# Patient Record
Sex: FEMALE | Race: WHITE | NOT HISPANIC OR LATINO | Employment: OTHER | URBAN - METROPOLITAN AREA
[De-identification: names, ages, dates, MRNs, and addresses within clinical notes are randomized per-mention and may not be internally consistent; named-entity substitution may affect disease eponyms.]

---

## 2023-05-19 ENCOUNTER — NURSING HOME VISIT (OUTPATIENT)
Dept: POST ACUTE CARE | Facility: EXTERNAL LOCATION | Age: 68
End: 2023-05-19
Payer: COMMERCIAL

## 2023-05-19 DIAGNOSIS — R60.0 LOWER EXTREMITY EDEMA: Primary | ICD-10-CM

## 2023-05-19 DIAGNOSIS — I10 PRIMARY HYPERTENSION: ICD-10-CM

## 2023-05-19 PROCEDURE — 99304 1ST NF CARE SF/LOW MDM 25: CPT | Performed by: INTERNAL MEDICINE

## 2023-05-19 NOTE — LETTER
Patient: Hannah Pedraza  : 1955    Encounter Date: 2023    Pt was seen in the NH,69 yo f with PMH HTN CVA  HERE FOR REHAB AFTER CERVICAL SPINE LAMINECTOMY AND FUSION CO LLE EDEMA  General appearance: Comfortable, no distress  ROS: No SOB  Medications reviewed  Head: Normal  Neck: Soft  Heart: Regular  Lungs: Clear  Abdomen: soft  EXT LLE EDEMA    Impression: VENOUS DOPPLER, continue current management    Problem List Items Addressed This Visit    None  Visit Diagnoses       Lower extremity edema    -  Primary    Primary hypertension                   Electronically Signed By: Josue Lock MD   23 11:52 PM

## 2023-05-20 NOTE — PROGRESS NOTES
Pt was seen in the NH,69 yo f with PMH HTN CVA  HERE FOR REHAB AFTER CERVICAL SPINE LAMINECTOMY AND FUSION CO LLE EDEMA  General appearance: Comfortable, no distress  ROS: No SOB  Medications reviewed  Head: Normal  Neck: Soft  Heart: Regular  Lungs: Clear  Abdomen: soft  EXT LLE EDEMA    Impression: VENOUS DOPPLER, continue current management    Problem List Items Addressed This Visit    None  Visit Diagnoses       Lower extremity edema    -  Primary    Primary hypertension

## 2023-05-30 ENCOUNTER — NURSING HOME VISIT (OUTPATIENT)
Dept: POST ACUTE CARE | Facility: EXTERNAL LOCATION | Age: 68
End: 2023-05-30
Payer: COMMERCIAL

## 2023-05-30 DIAGNOSIS — I10 PRIMARY HYPERTENSION: ICD-10-CM

## 2023-05-30 DIAGNOSIS — I82.4Z2 ACUTE DEEP VEIN THROMBOSIS (DVT) OF DISTAL VEIN OF LEFT LOWER EXTREMITY (MULTI): Primary | ICD-10-CM

## 2023-05-30 PROCEDURE — 99308 SBSQ NF CARE LOW MDM 20: CPT | Performed by: INTERNAL MEDICINE

## 2023-05-30 NOTE — PROGRESS NOTES
Pt was seen in the NH,Pt is doing fine , no complaint  General appearance: Comfortable, no distress  ROS: No SOB  Medications reviewed  Head: Normal  Neck: Soft  Heart: Regular  Lungs: Clear  Abdomen: soft  Ext chronic le edema    Impression: clinically doing fine, continue current management    Problem List Items Addressed This Visit          Circulatory    Primary hypertension    Acute deep vein thrombosis (DVT) of distal vein of left lower extremity (CMS/HCC) - Primary

## 2023-05-30 NOTE — LETTER
Patient: Hannah Pedraza  : 1955    Encounter Date: 2023    Pt was seen in the NH,Pt is doing fine , no complaint  General appearance: Comfortable, no distress  ROS: No SOB  Medications reviewed  Head: Normal  Neck: Soft  Heart: Regular  Lungs: Clear  Abdomen: soft  Ext chronic le edema    Impression: clinically doing fine, continue current management    Problem List Items Addressed This Visit          Circulatory    Primary hypertension    Acute deep vein thrombosis (DVT) of distal vein of left lower extremity (CMS/HCC) - Primary          Electronically Signed By: Josue Lock MD   23  5:35 PM

## 2023-06-01 ENCOUNTER — NURSING HOME VISIT (OUTPATIENT)
Dept: POST ACUTE CARE | Facility: EXTERNAL LOCATION | Age: 68
End: 2023-06-01
Payer: COMMERCIAL

## 2023-06-01 DIAGNOSIS — M54.2 SPINE PAIN, CERVICAL: ICD-10-CM

## 2023-06-01 DIAGNOSIS — I82.4Z2 ACUTE DEEP VEIN THROMBOSIS (DVT) OF DISTAL VEIN OF LEFT LOWER EXTREMITY (MULTI): Primary | ICD-10-CM

## 2023-06-01 DIAGNOSIS — E87.6 HYPOKALEMIA: ICD-10-CM

## 2023-06-01 PROCEDURE — 99309 SBSQ NF CARE MODERATE MDM 30: CPT | Performed by: NURSE PRACTITIONER

## 2023-06-01 NOTE — LETTER
Patient: Hannah Pedraza  : 1955    Encounter Date: 2023    Subjective  Patient ID: Hannah Pedraza is a 68 y.o. female who presents for No chief complaint on file..  67 yo female at [facility] for rehab/recent cervical spine surgery with recent diagnosis of DVT LLE.   is present in room.  Resident states she is doing fairly well except she does not feel her pain is being controlled.  Her pain is rated 8/10 especially in the early mornings.          Review of Systems   Constitutional:  Negative for fever.   Respiratory: Negative.     Cardiovascular: Negative.    Gastrointestinal: Negative.        Objective  Physical Exam  Constitutional:       General: She is not in acute distress.  Cardiovascular:      Rate and Rhythm: Normal rate and regular rhythm.   Pulmonary:      Effort: Pulmonary effort is normal.      Breath sounds: Normal breath sounds.   Abdominal:      General: Bowel sounds are normal.   Musculoskeletal:      Left lower leg: Edema present.      Comments: Surgical incision noted to cervical spine without any edema, erythema.  No drainage.  Well approximated.     Skin:     General: Skin is warm and dry.   Neurological:      Mental Status: She is alert.   Psychiatric:         Mood and Affect: Mood normal.         No current outpatient medications on file.     Assessment/Plan  Problem List Items Addressed This Visit          Nervous    Spine pain, cervical       Circulatory    Acute deep vein thrombosis (DVT) of distal vein of left lower extremity (CMS/HCC) - Primary       Other    Hypokalemia   Will schedule her oxycodone 5mg BID.  Continue rehab.    Hypokalemia;  She is on Lasix.  Start her on K+ and repeat her labs next wk.    Continue to monitor.  Care/tx discussed with nursing staff.             Electronically Signed By: IRAIS Lopez   23  7:59 AM

## 2023-06-07 PROBLEM — M54.2 SPINE PAIN, CERVICAL: Status: ACTIVE | Noted: 2023-06-07

## 2023-06-07 PROBLEM — E87.6 HYPOKALEMIA: Status: ACTIVE | Noted: 2023-06-07

## 2023-06-07 ASSESSMENT — ENCOUNTER SYMPTOMS
FEVER: 0
GASTROINTESTINAL NEGATIVE: 1
RESPIRATORY NEGATIVE: 1
CARDIOVASCULAR NEGATIVE: 1

## 2023-06-07 NOTE — PROGRESS NOTES
Subjective   Patient ID: Hannah Pedraza is a 68 y.o. female who presents for No chief complaint on file..  69 yo female at [facility] for rehab/recent cervical spine surgery with recent diagnosis of DVT LLE.   is present in room.  Resident states she is doing fairly well except she does not feel her pain is being controlled.  Her pain is rated 8/10 especially in the early mornings.          Review of Systems   Constitutional:  Negative for fever.   Respiratory: Negative.     Cardiovascular: Negative.    Gastrointestinal: Negative.        Objective   Physical Exam  Constitutional:       General: She is not in acute distress.  Cardiovascular:      Rate and Rhythm: Normal rate and regular rhythm.   Pulmonary:      Effort: Pulmonary effort is normal.      Breath sounds: Normal breath sounds.   Abdominal:      General: Bowel sounds are normal.   Musculoskeletal:      Left lower leg: Edema present.      Comments: Surgical incision noted to cervical spine without any edema, erythema.  No drainage.  Well approximated.     Skin:     General: Skin is warm and dry.   Neurological:      Mental Status: She is alert.   Psychiatric:         Mood and Affect: Mood normal.         No current outpatient medications on file.     Assessment/Plan   Problem List Items Addressed This Visit          Nervous    Spine pain, cervical       Circulatory    Acute deep vein thrombosis (DVT) of distal vein of left lower extremity (CMS/HCC) - Primary       Other    Hypokalemia   Will schedule her oxycodone 5mg BID.  Continue rehab.    Hypokalemia;  She is on Lasix.  Start her on K+ and repeat her labs next wk.    Continue to monitor.  Care/tx discussed with nursing staff.

## 2023-06-27 ENCOUNTER — NURSING HOME VISIT (OUTPATIENT)
Dept: POST ACUTE CARE | Facility: EXTERNAL LOCATION | Age: 68
End: 2023-06-27
Payer: COMMERCIAL

## 2023-06-27 DIAGNOSIS — I10 PRIMARY HYPERTENSION: Primary | ICD-10-CM

## 2023-06-27 DIAGNOSIS — E11.65 TYPE 2 DIABETES MELLITUS WITH HYPERGLYCEMIA, WITHOUT LONG-TERM CURRENT USE OF INSULIN (MULTI): ICD-10-CM

## 2023-06-27 PROCEDURE — 99308 SBSQ NF CARE LOW MDM 20: CPT | Performed by: INTERNAL MEDICINE

## 2023-06-27 NOTE — LETTER
Patient: Hannah Pedraza  : 1955    Encounter Date: 2023    Pt was seen in the NH,Pt is in her usual state , no complaint  General appearance: Comfortable, no distress  ROS: No SOB  Medications reviewed  Head: Normal  Neck: Soft  Heart: Regular  Lungs: Clear  Abdomen: soft    Impression: clinically doing fine, continue current management    Problem List Items Addressed This Visit       Primary hypertension - Primary    Type 2 diabetes mellitus with hyperglycemia, without long-term current use of insulin (CMS/[facility])          Electronically Signed By: Josue Lock MD   23  8:02 PM

## 2023-06-28 NOTE — PROGRESS NOTES
Pt was seen in the NH,Pt is in her usual state , no complaint  General appearance: Comfortable, no distress  ROS: No SOB  Medications reviewed  Head: Normal  Neck: Soft  Heart: Regular  Lungs: Clear  Abdomen: soft    Impression: clinically doing fine, continue current management    Problem List Items Addressed This Visit       Primary hypertension - Primary    Type 2 diabetes mellitus with hyperglycemia, without long-term current use of insulin (CMS/[facility])

## 2023-07-25 ENCOUNTER — NURSING HOME VISIT (OUTPATIENT)
Dept: POST ACUTE CARE | Facility: EXTERNAL LOCATION | Age: 68
End: 2023-07-25
Payer: COMMERCIAL

## 2023-07-25 DIAGNOSIS — L98.421: Primary | ICD-10-CM

## 2023-07-25 DIAGNOSIS — I10 PRIMARY HYPERTENSION: ICD-10-CM

## 2023-07-25 PROCEDURE — 99308 SBSQ NF CARE LOW MDM 20: CPT | Performed by: INTERNAL MEDICINE

## 2023-07-25 NOTE — LETTER
Patient: Hannah Pedraza  : 1955    Encounter Date: 2023    Pt was seen in the NH,Pt CO SORE ON THE SACRAL AREA  General appearance: Comfortable, no distress  ROS: No SOB  Medications reviewed  Head: Normal  Neck: Soft  Heart: Regular  Lungs: Clear  Abdomen: soft  SKIN SMALL CRACK ON THE SKIN AT SACRAL AREA    Impression: LOTRISONE CREAM BID WITH FOAM DRESSING X 7 DAYS THE DOUDERM, continue current management    Problem List Items Addressed This Visit       Primary hypertension     Other Visit Diagnoses       Sacral ulcer, limited to breakdown of skin (CMS/HCC)    -  Primary               Electronically Signed By: Josue Lock MD   23  4:21 PM

## 2023-07-25 NOTE — PROGRESS NOTES
Pt was seen in the NH,Pt CO SORE ON THE SACRAL AREA  General appearance: Comfortable, no distress  ROS: No SOB  Medications reviewed  Head: Normal  Neck: Soft  Heart: Regular  Lungs: Clear  Abdomen: soft  SKIN SMALL CRACK ON THE SKIN AT SACRAL AREA    Impression: LOTRISONE CREAM BID WITH FOAM DRESSING X 7 DAYS THE DOUDERM, continue current management    Problem List Items Addressed This Visit       Primary hypertension     Other Visit Diagnoses       Sacral ulcer, limited to breakdown of skin (CMS/HCC)    -  Primary             
Awake

## 2023-08-29 ENCOUNTER — NURSING HOME VISIT (OUTPATIENT)
Dept: POST ACUTE CARE | Facility: EXTERNAL LOCATION | Age: 68
End: 2023-08-29
Payer: COMMERCIAL

## 2023-08-29 DIAGNOSIS — I10 PRIMARY HYPERTENSION: ICD-10-CM

## 2023-08-29 DIAGNOSIS — J44.9 CHRONIC OBSTRUCTIVE PULMONARY DISEASE, UNSPECIFIED COPD TYPE (MULTI): Primary | ICD-10-CM

## 2023-08-29 PROCEDURE — 99308 SBSQ NF CARE LOW MDM 20: CPT | Performed by: INTERNAL MEDICINE

## 2023-08-29 NOTE — LETTER
Patient: Hannah Pedraza  : 1955    Encounter Date: 2023    Pt was seen in the NH.  Pt is in usual state , CO INCREASED PHLEGM  General appearance: Comfortable, no distress  ROS: No SOB  Medications reviewed  Head: Normal  Neck: Soft  Heart: Regular  Lungs: Clear  Abdomen: soft    Impression: clinically doing fine, ADD MUCINEX, continue current management    Problem List Items Addressed This Visit       Primary hypertension     Other Visit Diagnoses       Chronic obstructive pulmonary disease, unspecified COPD type (CMS/HCC)    -  Primary               Electronically Signed By: Josue Lock MD   23  7:29 PM

## 2023-08-29 NOTE — PROGRESS NOTES
Pt was seen in the NH.  Pt is in usual state , CO INCREASED PHLEGM  General appearance: Comfortable, no distress  ROS: No SOB  Medications reviewed  Head: Normal  Neck: Soft  Heart: Regular  Lungs: Clear  Abdomen: soft    Impression: clinically doing fine, ADD MUCINEX, continue current management    Problem List Items Addressed This Visit       Primary hypertension     Other Visit Diagnoses       Chronic obstructive pulmonary disease, unspecified COPD type (CMS/HCC)    -  Primary

## 2023-10-11 ENCOUNTER — HOSPITAL ENCOUNTER (EMERGENCY)
Facility: HOSPITAL | Age: 68
Discharge: NURSING, REHAB OR LONG-TERM CARE | End: 2023-10-11
Attending: EMERGENCY MEDICINE
Payer: COMMERCIAL

## 2023-10-11 ENCOUNTER — APPOINTMENT (OUTPATIENT)
Dept: RADIOLOGY | Facility: HOSPITAL | Age: 68
End: 2023-10-11
Payer: COMMERCIAL

## 2023-10-11 VITALS
DIASTOLIC BLOOD PRESSURE: 74 MMHG | WEIGHT: 183.2 LBS | SYSTOLIC BLOOD PRESSURE: 99 MMHG | OXYGEN SATURATION: 95 % | TEMPERATURE: 97.6 F | HEART RATE: 76 BPM | RESPIRATION RATE: 16 BRPM

## 2023-10-11 DIAGNOSIS — R11.10 VOMITING, UNSPECIFIED VOMITING TYPE, UNSPECIFIED WHETHER NAUSEA PRESENT: Primary | ICD-10-CM

## 2023-10-11 DIAGNOSIS — R11.2 NAUSEA AND VOMITING, UNSPECIFIED VOMITING TYPE: ICD-10-CM

## 2023-10-11 DIAGNOSIS — E27.8 RIGHT ADRENAL MASS (MULTI): ICD-10-CM

## 2023-10-11 LAB
ABO GROUP (TYPE) IN BLOOD: NORMAL
ALBUMIN SERPL BCP-MCNC: 3.8 G/DL (ref 3.4–5)
ALP SERPL-CCNC: 57 U/L (ref 33–136)
ALT SERPL W P-5'-P-CCNC: 8 U/L (ref 7–45)
ANION GAP SERPL CALC-SCNC: 13 MMOL/L (ref 10–20)
ANTIBODY SCREEN: NORMAL
APPEARANCE UR: CLEAR
AST SERPL W P-5'-P-CCNC: 9 U/L (ref 9–39)
BACTERIA #/AREA URNS AUTO: ABNORMAL /HPF
BASOPHILS # BLD AUTO: 0.05 X10*3/UL (ref 0–0.1)
BASOPHILS NFR BLD AUTO: 0.4 %
BILIRUB SERPL-MCNC: 0.5 MG/DL (ref 0–1.2)
BILIRUB UR STRIP.AUTO-MCNC: NEGATIVE MG/DL
BUN SERPL-MCNC: 18 MG/DL (ref 6–23)
CALCIUM SERPL-MCNC: 9.5 MG/DL (ref 8.6–10.3)
CARDIAC TROPONIN I PNL SERPL HS: 12 NG/L (ref 0–13)
CARDIAC TROPONIN I PNL SERPL HS: 7 NG/L (ref 0–13)
CHLORIDE SERPL-SCNC: 102 MMOL/L (ref 98–107)
CO2 SERPL-SCNC: 34 MMOL/L (ref 21–32)
COLOR UR: YELLOW
CREAT SERPL-MCNC: 0.62 MG/DL (ref 0.5–1.05)
EOSINOPHIL # BLD AUTO: 0.21 X10*3/UL (ref 0–0.7)
EOSINOPHIL NFR BLD AUTO: 1.7 %
ERYTHROCYTE [DISTWIDTH] IN BLOOD BY AUTOMATED COUNT: 17 % (ref 11.5–14.5)
GFR SERPL CREATININE-BSD FRML MDRD: >90 ML/MIN/1.73M*2
GLUCOSE SERPL-MCNC: 118 MG/DL (ref 74–99)
GLUCOSE UR STRIP.AUTO-MCNC: NEGATIVE MG/DL
HCT VFR BLD AUTO: 43.4 % (ref 36–46)
HGB BLD-MCNC: 13.9 G/DL (ref 12–16)
HOLD SPECIMEN: NORMAL
IMM GRANULOCYTES # BLD AUTO: 0.06 X10*3/UL (ref 0–0.7)
IMM GRANULOCYTES NFR BLD AUTO: 0.5 % (ref 0–0.9)
KETONES UR STRIP.AUTO-MCNC: NEGATIVE MG/DL
LACTATE SERPL-SCNC: 1.6 MMOL/L (ref 0.4–2)
LEUKOCYTE ESTERASE UR QL STRIP.AUTO: ABNORMAL
LIPASE SERPL-CCNC: 11 U/L (ref 9–82)
LYMPHOCYTES # BLD AUTO: 3.25 X10*3/UL (ref 1.2–4.8)
LYMPHOCYTES NFR BLD AUTO: 26.5 %
MCH RBC QN AUTO: 27.9 PG (ref 26–34)
MCHC RBC AUTO-ENTMCNC: 32 G/DL (ref 32–36)
MCV RBC AUTO: 87 FL (ref 80–100)
MONOCYTES # BLD AUTO: 1.09 X10*3/UL (ref 0.1–1)
MONOCYTES NFR BLD AUTO: 8.9 %
MUCOUS THREADS #/AREA URNS AUTO: ABNORMAL /LPF
NEUTROPHILS # BLD AUTO: 7.61 X10*3/UL (ref 1.2–7.7)
NEUTROPHILS NFR BLD AUTO: 62 %
NITRITE UR QL STRIP.AUTO: NEGATIVE
NRBC BLD-RTO: 0 /100 WBCS (ref 0–0)
PH UR STRIP.AUTO: 7 [PH]
PLATELET # BLD AUTO: 329 X10*3/UL (ref 150–450)
PMV BLD AUTO: 9.8 FL (ref 7.5–11.5)
POTASSIUM SERPL-SCNC: 3.1 MMOL/L (ref 3.5–5.3)
PROT SERPL-MCNC: 6.9 G/DL (ref 6.4–8.2)
PROT UR STRIP.AUTO-MCNC: ABNORMAL MG/DL
RBC # BLD AUTO: 4.99 X10*6/UL (ref 4–5.2)
RBC # UR STRIP.AUTO: NEGATIVE /UL
RBC #/AREA URNS AUTO: >20 /HPF
RH FACTOR (ANTIGEN D): NORMAL
SODIUM SERPL-SCNC: 146 MMOL/L (ref 136–145)
SP GR UR STRIP.AUTO: 1.01
SQUAMOUS #/AREA URNS AUTO: ABNORMAL /HPF
UROBILINOGEN UR STRIP.AUTO-MCNC: <2 MG/DL
WBC # BLD AUTO: 12.3 X10*3/UL (ref 4.4–11.3)
WBC #/AREA URNS AUTO: ABNORMAL /HPF

## 2023-10-11 PROCEDURE — 84484 ASSAY OF TROPONIN QUANT: CPT | Performed by: EMERGENCY MEDICINE

## 2023-10-11 PROCEDURE — 87086 URINE CULTURE/COLONY COUNT: CPT | Mod: CMCLAB,SAMLAB | Performed by: EMERGENCY MEDICINE

## 2023-10-11 PROCEDURE — 2550000001 HC RX 255 CONTRASTS: Performed by: EMERGENCY MEDICINE

## 2023-10-11 PROCEDURE — 2500000004 HC RX 250 GENERAL PHARMACY W/ HCPCS (ALT 636 FOR OP/ED): Performed by: EMERGENCY MEDICINE

## 2023-10-11 PROCEDURE — 93005 ELECTROCARDIOGRAM TRACING: CPT

## 2023-10-11 PROCEDURE — 74177 CT ABD & PELVIS W/CONTRAST: CPT | Mod: FOREIGN READ | Performed by: RADIOLOGY

## 2023-10-11 PROCEDURE — 83605 ASSAY OF LACTIC ACID: CPT | Performed by: EMERGENCY MEDICINE

## 2023-10-11 PROCEDURE — G1004 CDSM NDSC: HCPCS

## 2023-10-11 PROCEDURE — 81001 URINALYSIS AUTO W/SCOPE: CPT | Performed by: EMERGENCY MEDICINE

## 2023-10-11 PROCEDURE — 85025 COMPLETE CBC W/AUTO DIFF WBC: CPT | Performed by: EMERGENCY MEDICINE

## 2023-10-11 PROCEDURE — 36415 COLL VENOUS BLD VENIPUNCTURE: CPT | Performed by: EMERGENCY MEDICINE

## 2023-10-11 PROCEDURE — 80053 COMPREHEN METABOLIC PANEL: CPT | Performed by: EMERGENCY MEDICINE

## 2023-10-11 PROCEDURE — 83690 ASSAY OF LIPASE: CPT | Performed by: EMERGENCY MEDICINE

## 2023-10-11 PROCEDURE — 84484 ASSAY OF TROPONIN QUANT: CPT | Mod: 59 | Performed by: EMERGENCY MEDICINE

## 2023-10-11 PROCEDURE — C9113 INJ PANTOPRAZOLE SODIUM, VIA: HCPCS | Performed by: EMERGENCY MEDICINE

## 2023-10-11 PROCEDURE — 99284 EMERGENCY DEPT VISIT MOD MDM: CPT | Performed by: EMERGENCY MEDICINE

## 2023-10-11 PROCEDURE — 86850 RBC ANTIBODY SCREEN: CPT | Performed by: EMERGENCY MEDICINE

## 2023-10-11 RX ORDER — LOPERAMIDE HCL 2 MG
2 TABLET ORAL EVERY 4 HOURS PRN
COMMUNITY

## 2023-10-11 RX ORDER — FERROUS SULFATE 325(65) MG
1 TABLET, DELAYED RELEASE (ENTERIC COATED) ORAL 2 TIMES DAILY
COMMUNITY

## 2023-10-11 RX ORDER — POTASSIUM CHLORIDE 20 MEQ/1
20 TABLET, EXTENDED RELEASE ORAL DAILY
COMMUNITY

## 2023-10-11 RX ORDER — OXYCODONE HYDROCHLORIDE 5 MG/1
5 TABLET ORAL 2 TIMES DAILY
COMMUNITY

## 2023-10-11 RX ORDER — PANTOPRAZOLE SODIUM 40 MG/10ML
80 INJECTION, POWDER, LYOPHILIZED, FOR SOLUTION INTRAVENOUS ONCE
Status: COMPLETED | OUTPATIENT
Start: 2023-10-11 | End: 2023-10-11

## 2023-10-11 RX ORDER — ONDANSETRON 4 MG/1
4 TABLET, FILM COATED ORAL EVERY 4 HOURS PRN
COMMUNITY

## 2023-10-11 RX ORDER — LIDOCAINE 560 MG/1
1 PATCH PERCUTANEOUS; TOPICAL; TRANSDERMAL DAILY
COMMUNITY

## 2023-10-11 RX ORDER — POTASSIUM CHLORIDE 14.9 MG/ML
20 INJECTION INTRAVENOUS
Status: COMPLETED | OUTPATIENT
Start: 2023-10-11 | End: 2023-10-11

## 2023-10-11 RX ORDER — CARVEDILOL 12.5 MG/1
12.5 TABLET ORAL 2 TIMES DAILY
COMMUNITY

## 2023-10-11 RX ORDER — LORATADINE 10 MG/1
10 TABLET ORAL DAILY PRN
COMMUNITY

## 2023-10-11 RX ORDER — ERGOCALCIFEROL 1.25 MG/1
1.25 CAPSULE ORAL
COMMUNITY
End: 2024-02-19 | Stop reason: ENTERED-IN-ERROR

## 2023-10-11 RX ORDER — DOCUSATE SODIUM 100 MG/1
100 CAPSULE, LIQUID FILLED ORAL 2 TIMES DAILY
COMMUNITY

## 2023-10-11 RX ORDER — GLIMEPIRIDE 2 MG/1
2 TABLET ORAL DAILY
COMMUNITY

## 2023-10-11 RX ORDER — FLUOROMETHOLONE 1 MG/ML
1 SUSPENSION/ DROPS OPHTHALMIC 3 TIMES DAILY
COMMUNITY
End: 2024-02-19 | Stop reason: ENTERED-IN-ERROR

## 2023-10-11 RX ORDER — ONDANSETRON HYDROCHLORIDE 2 MG/ML
4 INJECTION, SOLUTION INTRAVENOUS ONCE
Status: COMPLETED | OUTPATIENT
Start: 2023-10-11 | End: 2023-10-11

## 2023-10-11 RX ORDER — METFORMIN HYDROCHLORIDE 1000 MG/1
1000 TABLET ORAL
COMMUNITY

## 2023-10-11 RX ORDER — FAMOTIDINE 20 MG/1
20 TABLET, FILM COATED ORAL NIGHTLY
COMMUNITY

## 2023-10-11 RX ORDER — ACETAMINOPHEN 500 MG
1000 TABLET ORAL 3 TIMES DAILY
COMMUNITY

## 2023-10-11 RX ORDER — SODIUM CHLORIDE 5 %
1 OINTMENT (GRAM) OPHTHALMIC (EYE) 4 TIMES DAILY
COMMUNITY
End: 2024-02-19 | Stop reason: ENTERED-IN-ERROR

## 2023-10-11 RX ORDER — MENTHOL 100 MG/G
1 CREAM TOPICAL EVERY 6 HOURS PRN
COMMUNITY

## 2023-10-11 RX ORDER — TIZANIDINE 4 MG/1
2 TABLET ORAL EVERY 8 HOURS PRN
COMMUNITY

## 2023-10-11 RX ORDER — GABAPENTIN 800 MG/1
800 TABLET ORAL 3 TIMES DAILY
COMMUNITY

## 2023-10-11 RX ORDER — ALBUTEROL SULFATE 90 UG/1
2 AEROSOL, METERED RESPIRATORY (INHALATION) EVERY 4 HOURS PRN
COMMUNITY

## 2023-10-11 RX ORDER — DULOXETIN HYDROCHLORIDE 60 MG/1
60 CAPSULE, DELAYED RELEASE ORAL DAILY
COMMUNITY

## 2023-10-11 RX ORDER — ATORVASTATIN CALCIUM 40 MG/1
40 TABLET, FILM COATED ORAL DAILY
COMMUNITY

## 2023-10-11 RX ORDER — SENNOSIDES 8.6 MG/1
1 TABLET ORAL 2 TIMES DAILY
COMMUNITY

## 2023-10-11 RX ORDER — SODIUM CHLORIDE 9 MG/ML
150 INJECTION, SOLUTION INTRAVENOUS CONTINUOUS
Status: DISCONTINUED | OUTPATIENT
Start: 2023-10-11 | End: 2023-10-11 | Stop reason: HOSPADM

## 2023-10-11 RX ORDER — FUROSEMIDE 20 MG/1
20 TABLET ORAL DAILY
COMMUNITY

## 2023-10-11 RX ORDER — NYSTATIN 100000 [USP'U]/G
1 POWDER TOPICAL 3 TIMES DAILY
COMMUNITY
Start: 2024-02-06

## 2023-10-11 RX ADMIN — ONDANSETRON 4 MG: 2 INJECTION INTRAMUSCULAR; INTRAVENOUS at 02:28

## 2023-10-11 RX ADMIN — IOHEXOL 90 ML: 350 INJECTION, SOLUTION INTRAVENOUS at 04:06

## 2023-10-11 RX ADMIN — PANTOPRAZOLE SODIUM 80 MG: 40 INJECTION, POWDER, FOR SOLUTION INTRAVENOUS at 02:29

## 2023-10-11 RX ADMIN — SODIUM CHLORIDE 500 ML: 9 INJECTION, SOLUTION INTRAVENOUS at 02:00

## 2023-10-11 RX ADMIN — POTASSIUM CHLORIDE 20 MEQ: 14.9 INJECTION, SOLUTION INTRAVENOUS at 07:17

## 2023-10-11 RX ADMIN — POTASSIUM CHLORIDE 20 MEQ: 14.9 INJECTION, SOLUTION INTRAVENOUS at 05:09

## 2023-10-11 RX ADMIN — SODIUM CHLORIDE 150 ML/HR: 9 INJECTION, SOLUTION INTRAVENOUS at 02:35

## 2023-10-11 ASSESSMENT — ENCOUNTER SYMPTOMS
MUSCULOSKELETAL NEGATIVE: 1
CONSTITUTIONAL NEGATIVE: 1
VOMITING: 1
ALLERGIC/IMMUNOLOGIC NEGATIVE: 1
NEUROLOGICAL NEGATIVE: 1
PSYCHIATRIC NEGATIVE: 1
NAUSEA: 1
RESPIRATORY NEGATIVE: 1
HEMATOLOGIC/LYMPHATIC NEGATIVE: 1
DIARRHEA: 1
EYES NEGATIVE: 1
CARDIOVASCULAR NEGATIVE: 1
ENDOCRINE NEGATIVE: 1
ABDOMINAL PAIN: 1

## 2023-10-11 ASSESSMENT — COLUMBIA-SUICIDE SEVERITY RATING SCALE - C-SSRS
1. IN THE PAST MONTH, HAVE YOU WISHED YOU WERE DEAD OR WISHED YOU COULD GO TO SLEEP AND NOT WAKE UP?: NO
6. HAVE YOU EVER DONE ANYTHING, STARTED TO DO ANYTHING, OR PREPARED TO DO ANYTHING TO END YOUR LIFE?: NO
2. HAVE YOU ACTUALLY HAD ANY THOUGHTS OF KILLING YOURSELF?: NO

## 2023-10-11 ASSESSMENT — PAIN - FUNCTIONAL ASSESSMENT: PAIN_FUNCTIONAL_ASSESSMENT: 0-10

## 2023-10-11 ASSESSMENT — PAIN SCALES - GENERAL: PAINLEVEL_OUTOF10: 4

## 2023-10-11 NOTE — ED PROVIDER NOTES
Coffee-ground emesis.  This 68-year-old white female presents to the ED via EMS from a skilled nursing facility due to complaint of coffee-ground emesis.  The patient states that she has been experiencing diarrhea for past 3 days and since yesterday has been having nausea and vomiting.  Apparently prior to arrival to the ED the patient had a large dark-colored emesis.  Patient states that she does have very good vision and has not noticed any blood in her emesis up to this point time.  She does admit to some diffuse abdominal pain states is worse in the epigastrium.  She states that she was treated with Imodium for her diarrhea that started 3 days ago.  She states that nothing makes her symptoms better or worse.  She denies any shortness of breath or chest pain.      History provided by:  Patient and EMS personnel   used: No         Review of Systems   Constitutional: Negative.    HENT: Negative.     Eyes: Negative.    Respiratory: Negative.     Cardiovascular: Negative.    Gastrointestinal:  Positive for abdominal pain, diarrhea, nausea and vomiting.        Coffee-ground emesis.   Endocrine: Negative.    Genitourinary: Negative.    Musculoskeletal: Negative.    Skin: Negative.    Allergic/Immunologic: Negative.    Neurological: Negative.    Hematological: Negative.    Psychiatric/Behavioral: Negative.          Physical Exam  Vitals reviewed.   Constitutional:       Appearance: Normal appearance.   HENT:      Head: Normocephalic.      Nose: Nose normal.      Mouth/Throat:      Mouth: Mucous membranes are moist.      Pharynx: Oropharynx is clear.   Eyes:      Comments: Patient for the most part keeps her eyes closed and does not have good eye contact.   Cardiovascular:      Rate and Rhythm: Regular rhythm. Tachycardia present.      Pulses: Normal pulses.      Heart sounds: Normal heart sounds.   Pulmonary:      Effort: Pulmonary effort is normal.      Breath sounds: Normal breath sounds.    Abdominal:      General: Bowel sounds are normal.      Palpations: Abdomen is soft.      Tenderness: There is abdominal tenderness.      Comments: Patient has tenderness across the upper abdomen worse in the epigastrium with voluntary guarding.  There is no rebound or rigidity.  No CVA tenderness   Musculoskeletal:         General: Swelling present. Normal range of motion.      Cervical back: Normal range of motion and neck supple.   Skin:     General: Skin is warm and dry.      Capillary Refill: Capillary refill takes less than 2 seconds.   Neurological:      General: No focal deficit present.      Mental Status: She is alert and oriented to person, place, and time. Mental status is at baseline.   Psychiatric:         Mood and Affect: Mood normal.         Behavior: Behavior normal.         Thought Content: Thought content normal.         Judgment: Judgment normal.          Labs Reviewed - No data to display     No orders to display        Procedures     Medical Decision Making  Patient care has been turned over to the oncoming ED attending.  Patient was being seen and evaluated due to complaints of coffee-ground emesis.    Amount and/or Complexity of Data Reviewed  ECG/medicine tests: independent interpretation performed.     Details: EKG was interpreted by myself at 1:57 AM reveals sinus tachycardia 120 bpm with Q waves in leads III and aVF.  No other acute ST or T wave changes noted.  The OK interval is 142 ms.  The QRS duration 76 ms.  The QTc is 463 ms.  Axis is 21 degrees.         Diagnoses as of 12/02/23 0756   Right adrenal mass (CMS/HCC)   Nausea and vomiting, unspecified vomiting type                    Pacheco Laboy, DO  12/02/23 0756

## 2023-10-11 NOTE — PROGRESS NOTES
Emergency Medicine Transition of Care Note.    I received Hannah Pedraza in signout from Dr. Laboy.  Please see the previous ED provider note for all HPI, PE and MDM up to the time of signout at 07:00. This is in addition to the primary record.    In brief Hannah Pedraza is an 68 y.o. female presenting for   Chief Complaint   Patient presents with    Vomiting Blood     Pt here via EMS from [facility], pt states that she's been vomiting large amounts of dark/black emesis for two days. Had some diarrhea x 2 days before that which has resolved. Pt states she was given zofran but it hasn't helped the N/V. Is on Eliquis for LLE DVT. Denies hx of GI bleed/ulcers. Reports lower abd pain. BP 85/66     At the time of signout we were awaiting: urinalysis    Diagnoses as of 10/11/23 1210   Right adrenal mass (CMS/HCC)       Medical Decision Making    I was asked to evaluate the patient's urinalysis which was pending at the time of physician handoff.  Urinalysis shows no acute issues    I went into the room to reevaluate the patient and she is resting comfortably and is pleased she will be discharged.  I went over her CAT scan with her including the incidental findings of multiple gallstones and a right adrenal mass.  The patient informs me that she is aware of the right adrenal mass and this is known to her and not a new problem.  She states she was notified about this about 10 years ago.  Given its stability I do not feel she needs further treatment on an emergent basis about this issue but was instructed to follow-up with her private physician and she understands this importance    Patient will be discharged back to her ECF in stable condition    Final diagnoses:   [E27.8] Right adrenal mass (CMS/HCC)           Procedure  Procedures    Gerry Goodwin, DO

## 2023-10-12 LAB — BACTERIA UR CULT: ABNORMAL

## 2023-10-13 ENCOUNTER — TELEPHONE (OUTPATIENT)
Dept: PHARMACY | Facility: HOSPITAL | Age: 68
End: 2023-10-13
Payer: COMMERCIAL

## 2023-10-13 NOTE — PROGRESS NOTES
EDPD Note: Lab/Chart Reviewed    Reviewed Mr./Mrs./Ms. Hannah Pedraza 's chart regarding a contaminated urine culture that was taken during their recent emergency room visit. The patient was transferred back to their rehab/LTC facility .Therefore, I have faxed this information to [facility] at fax number [phone]          No further follow up needed from EDPD Team.     Dolly Thomas, PharmD  PGY-1 Pharmacy Resident  [phone]

## 2023-11-19 ENCOUNTER — NURSING HOME VISIT (OUTPATIENT)
Dept: POST ACUTE CARE | Facility: EXTERNAL LOCATION | Age: 68
End: 2023-11-19
Payer: COMMERCIAL

## 2023-11-19 DIAGNOSIS — J44.9 CHRONIC OBSTRUCTIVE PULMONARY DISEASE, UNSPECIFIED COPD TYPE (MULTI): Primary | ICD-10-CM

## 2023-11-19 DIAGNOSIS — I10 PRIMARY HYPERTENSION: ICD-10-CM

## 2023-11-19 PROCEDURE — 99308 SBSQ NF CARE LOW MDM 20: CPT | Performed by: INTERNAL MEDICINE

## 2023-11-19 NOTE — LETTER
Patient: Hannah Pedraza  : 1955    Encounter Date: 2023    Pt was seen in the NH.  Pt is in usual state , no complaint  General appearance: Comfortable, no distress  ROS: No SOB  Medications reviewed  Head: Normal  Neck: Soft  Heart: Regular  Lungs: Clear  Abdomen: soft    Impression: clinically doing fine, continue current management    Problem List Items Addressed This Visit       Primary hypertension     Other Visit Diagnoses       Chronic obstructive pulmonary disease, unspecified COPD type (CMS/HCC)    -  Primary               Electronically Signed By: Josue Lock MD   23 10:22 PM

## 2023-11-20 NOTE — PROGRESS NOTES
Pt was seen in the NH.  Pt is in usual state , no complaint  General appearance: Comfortable, no distress  ROS: No SOB  Medications reviewed  Head: Normal  Neck: Soft  Heart: Regular  Lungs: Clear  Abdomen: soft    Impression: clinically doing fine, continue current management    Problem List Items Addressed This Visit       Primary hypertension     Other Visit Diagnoses       Chronic obstructive pulmonary disease, unspecified COPD type (CMS/HCC)    -  Primary

## 2023-12-13 LAB
HOLD SPECIMEN: NORMAL
HOLD SPECIMEN: NORMAL

## 2024-01-30 ENCOUNTER — NURSING HOME VISIT (OUTPATIENT)
Dept: POST ACUTE CARE | Facility: EXTERNAL LOCATION | Age: 69
End: 2024-01-30
Payer: COMMERCIAL

## 2024-01-30 DIAGNOSIS — I10 PRIMARY HYPERTENSION: ICD-10-CM

## 2024-01-30 DIAGNOSIS — M54.50 CHRONIC MIDLINE LOW BACK PAIN WITHOUT SCIATICA: Primary | ICD-10-CM

## 2024-01-30 DIAGNOSIS — G89.29 CHRONIC MIDLINE LOW BACK PAIN WITHOUT SCIATICA: Primary | ICD-10-CM

## 2024-01-30 PROCEDURE — 99308 SBSQ NF CARE LOW MDM 20: CPT | Performed by: INTERNAL MEDICINE

## 2024-01-30 NOTE — LETTER
Patient: Hannah Pedraza  : 1955    Encounter Date: 2024    Pt was seen in the NH.  Co LBP , mild, no radation  General appearance: Comfortable, no distress  ROS: No SOB  Medications reviewed  Head: Normal  Neck: Soft  Heart: Regular  Lungs: Clear  Abdomen: soft    Impression: LS spine xr, continue current management    Problem List Items Addressed This Visit       Primary hypertension     Other Visit Diagnoses       Chronic midline low back pain without sciatica    -  Primary               Electronically Signed By: Josue Lock MD   24  9:32 PM

## 2024-01-31 NOTE — PROGRESS NOTES
Pt was seen in the NH.  Co LBP , mild, no radation  General appearance: Comfortable, no distress  ROS: No SOB  Medications reviewed  Head: Normal  Neck: Soft  Heart: Regular  Lungs: Clear  Abdomen: soft    Impression: LS spine xr, continue current management    Problem List Items Addressed This Visit       Primary hypertension     Other Visit Diagnoses       Chronic midline low back pain without sciatica    -  Primary

## 2024-02-19 ENCOUNTER — APPOINTMENT (OUTPATIENT)
Dept: RADIOLOGY | Facility: HOSPITAL | Age: 69
DRG: 853 | End: 2024-02-19
Payer: COMMERCIAL

## 2024-02-19 ENCOUNTER — APPOINTMENT (OUTPATIENT)
Dept: CARDIOLOGY | Facility: HOSPITAL | Age: 69
DRG: 853 | End: 2024-02-19
Payer: COMMERCIAL

## 2024-02-19 ENCOUNTER — HOSPITAL ENCOUNTER (INPATIENT)
Facility: HOSPITAL | Age: 69
LOS: 7 days | Discharge: OTHER INSTITUTION | DRG: 853 | End: 2024-02-26
Attending: EMERGENCY MEDICINE | Admitting: HOSPITALIST
Payer: COMMERCIAL

## 2024-02-19 DIAGNOSIS — I50.33 CHF (CONGESTIVE HEART FAILURE), NYHA CLASS I, ACUTE ON CHRONIC, DIASTOLIC (MULTI): ICD-10-CM

## 2024-02-19 DIAGNOSIS — K81.9 CHOLECYSTITIS: ICD-10-CM

## 2024-02-19 DIAGNOSIS — N13.2 HYDRONEPHROSIS WITH URINARY OBSTRUCTION DUE TO URETERAL CALCULUS: ICD-10-CM

## 2024-02-19 DIAGNOSIS — I50.42 CHRONIC COMBINED SYSTOLIC (CONGESTIVE) AND DIASTOLIC (CONGESTIVE) HEART FAILURE (MULTI): ICD-10-CM

## 2024-02-19 DIAGNOSIS — R65.21 SEPTIC SHOCK (MULTI): Primary | ICD-10-CM

## 2024-02-19 DIAGNOSIS — A41.9 SEPTIC SHOCK (MULTI): Primary | ICD-10-CM

## 2024-02-19 DIAGNOSIS — H04.123 BILATERAL DRY EYES: ICD-10-CM

## 2024-02-19 DIAGNOSIS — N39.0 URINARY TRACT INFECTION WITHOUT HEMATURIA, SITE UNSPECIFIED: ICD-10-CM

## 2024-02-19 DIAGNOSIS — N20.1 CALCULUS OF URETER: ICD-10-CM

## 2024-02-19 LAB
ALBUMIN SERPL BCP-MCNC: 2.9 G/DL (ref 3.4–5)
ALP SERPL-CCNC: 118 U/L (ref 33–136)
ALT SERPL W P-5'-P-CCNC: 26 U/L (ref 7–45)
ANION GAP SERPL CALC-SCNC: 16 MMOL/L (ref 10–20)
APPEARANCE UR: ABNORMAL
APTT PPP: 33 SECONDS (ref 27–38)
AST SERPL W P-5'-P-CCNC: 38 U/L (ref 9–39)
BACTERIA #/AREA URNS AUTO: ABNORMAL /HPF
BASOPHILS # BLD AUTO: 0 X10*3/UL (ref 0–0.1)
BASOPHILS NFR BLD AUTO: 0 %
BILIRUB DIRECT SERPL-MCNC: 1.3 MG/DL (ref 0–0.3)
BILIRUB SERPL-MCNC: 1.9 MG/DL (ref 0–1.2)
BILIRUB UR STRIP.AUTO-MCNC: NEGATIVE MG/DL
BNP SERPL-MCNC: 127 PG/ML (ref 0–99)
BUN SERPL-MCNC: 27 MG/DL (ref 6–23)
CALCIUM SERPL-MCNC: 9.2 MG/DL (ref 8.6–10.3)
CARDIAC TROPONIN I PNL SERPL HS: 23 NG/L (ref 0–13)
CARDIAC TROPONIN I PNL SERPL HS: 33 NG/L (ref 0–13)
CHLORIDE SERPL-SCNC: 102 MMOL/L (ref 98–107)
CO2 SERPL-SCNC: 22 MMOL/L (ref 21–32)
COLOR UR: ABNORMAL
CREAT SERPL-MCNC: 1.3 MG/DL (ref 0.5–1.05)
EGFRCR SERPLBLD CKD-EPI 2021: 45 ML/MIN/1.73M*2
EOSINOPHIL # BLD AUTO: 0.02 X10*3/UL (ref 0–0.7)
EOSINOPHIL NFR BLD AUTO: 0.5 %
ERYTHROCYTE [DISTWIDTH] IN BLOOD BY AUTOMATED COUNT: 13.5 % (ref 11.5–14.5)
FLUAV RNA RESP QL NAA+PROBE: NOT DETECTED
FLUBV RNA RESP QL NAA+PROBE: NOT DETECTED
GLUCOSE BLD MANUAL STRIP-MCNC: 198 MG/DL (ref 74–99)
GLUCOSE BLD MANUAL STRIP-MCNC: 212 MG/DL (ref 74–99)
GLUCOSE SERPL-MCNC: 88 MG/DL (ref 74–99)
GLUCOSE UR STRIP.AUTO-MCNC: NEGATIVE MG/DL
GRAN CASTS #/AREA UR COMP ASSIST: ABNORMAL /LPF
HCT VFR BLD AUTO: 43.2 % (ref 36–46)
HGB BLD-MCNC: 13.4 G/DL (ref 12–16)
HOLD SPECIMEN: NORMAL
HYALINE CASTS #/AREA URNS AUTO: ABNORMAL /LPF
IMM GRANULOCYTES # BLD AUTO: 0.01 X10*3/UL (ref 0–0.7)
IMM GRANULOCYTES NFR BLD AUTO: 0.2 % (ref 0–0.9)
INR PPP: 1.7 (ref 0.9–1.1)
KETONES UR STRIP.AUTO-MCNC: NEGATIVE MG/DL
LACTATE SERPL-SCNC: 2.3 MMOL/L (ref 0.4–2)
LACTATE SERPL-SCNC: 2.5 MMOL/L (ref 0.4–2)
LACTATE SERPL-SCNC: 2.6 MMOL/L (ref 0.4–2)
LACTATE SERPL-SCNC: 3.6 MMOL/L (ref 0.4–2)
LACTATE SERPL-SCNC: 4.9 MMOL/L (ref 0.4–2)
LACTATE SERPL-SCNC: 5 MMOL/L (ref 0.4–2)
LEUKOCYTE ESTERASE UR QL STRIP.AUTO: ABNORMAL
LYMPHOCYTES # BLD AUTO: 0.48 X10*3/UL (ref 1.2–4.8)
LYMPHOCYTES NFR BLD AUTO: 10.9 %
MAGNESIUM SERPL-MCNC: 1.67 MG/DL (ref 1.6–2.4)
MCH RBC QN AUTO: 30 PG (ref 26–34)
MCHC RBC AUTO-ENTMCNC: 31 G/DL (ref 32–36)
MCV RBC AUTO: 97 FL (ref 80–100)
MONOCYTES # BLD AUTO: 0.02 X10*3/UL (ref 0.1–1)
MONOCYTES NFR BLD AUTO: 0.5 %
MUCOUS THREADS #/AREA URNS AUTO: ABNORMAL /LPF
NEUTROPHILS # BLD AUTO: 3.87 X10*3/UL (ref 1.2–7.7)
NEUTROPHILS NFR BLD AUTO: 87.9 %
NITRITE UR QL STRIP.AUTO: NEGATIVE
NRBC BLD-RTO: 0 /100 WBCS (ref 0–0)
PH UR STRIP.AUTO: 5 [PH]
PLATELET # BLD AUTO: 201 X10*3/UL (ref 150–450)
POTASSIUM SERPL-SCNC: 5 MMOL/L (ref 3.5–5.3)
PROT SERPL-MCNC: 5.4 G/DL (ref 6.4–8.2)
PROT UR STRIP.AUTO-MCNC: ABNORMAL MG/DL
PROTHROMBIN TIME: 19.5 SECONDS (ref 9.8–12.8)
RBC # BLD AUTO: 4.47 X10*6/UL (ref 4–5.2)
RBC # UR STRIP.AUTO: ABNORMAL /UL
RBC #/AREA URNS AUTO: >20 /HPF
SARS-COV-2 RNA RESP QL NAA+PROBE: NOT DETECTED
SODIUM SERPL-SCNC: 135 MMOL/L (ref 136–145)
SP GR UR STRIP.AUTO: 1.02
UFH PPP CHRO-ACNC: 1.4 IU/ML
UFH PPP CHRO-ACNC: 1.8 IU/ML
UROBILINOGEN UR STRIP.AUTO-MCNC: <2 MG/DL
WBC # BLD AUTO: 4.4 X10*3/UL (ref 4.4–11.3)
WBC #/AREA URNS AUTO: >50 /HPF
WBC CLUMPS #/AREA URNS AUTO: ABNORMAL /HPF

## 2024-02-19 PROCEDURE — 96375 TX/PRO/DX INJ NEW DRUG ADDON: CPT

## 2024-02-19 PROCEDURE — 71250 CT THORAX DX C-: CPT | Performed by: RADIOLOGY

## 2024-02-19 PROCEDURE — 87086 URINE CULTURE/COLONY COUNT: CPT | Mod: SAMLAB | Performed by: EMERGENCY MEDICINE

## 2024-02-19 PROCEDURE — 82248 BILIRUBIN DIRECT: CPT | Performed by: EMERGENCY MEDICINE

## 2024-02-19 PROCEDURE — 94664 DEMO&/EVAL PT USE INHALER: CPT

## 2024-02-19 PROCEDURE — 96367 TX/PROPH/DG ADDL SEQ IV INF: CPT

## 2024-02-19 PROCEDURE — 2500000002 HC RX 250 W HCPCS SELF ADMINISTERED DRUGS (ALT 637 FOR MEDICARE OP, ALT 636 FOR OP/ED): Performed by: HOSPITALIST

## 2024-02-19 PROCEDURE — 2500000004 HC RX 250 GENERAL PHARMACY W/ HCPCS (ALT 636 FOR OP/ED)

## 2024-02-19 PROCEDURE — 96361 HYDRATE IV INFUSION ADD-ON: CPT

## 2024-02-19 PROCEDURE — 99291 CRITICAL CARE FIRST HOUR: CPT | Performed by: HOSPITALIST

## 2024-02-19 PROCEDURE — 83605 ASSAY OF LACTIC ACID: CPT | Performed by: HOSPITALIST

## 2024-02-19 PROCEDURE — 96376 TX/PRO/DX INJ SAME DRUG ADON: CPT

## 2024-02-19 PROCEDURE — 3E033XZ INTRODUCTION OF VASOPRESSOR INTO PERIPHERAL VEIN, PERCUTANEOUS APPROACH: ICD-10-PCS | Performed by: STUDENT IN AN ORGANIZED HEALTH CARE EDUCATION/TRAINING PROGRAM

## 2024-02-19 PROCEDURE — 99222 1ST HOSP IP/OBS MODERATE 55: CPT | Performed by: SURGERY

## 2024-02-19 PROCEDURE — 2500000005 HC RX 250 GENERAL PHARMACY W/O HCPCS: Performed by: EMERGENCY MEDICINE

## 2024-02-19 PROCEDURE — 2020000001 HC ICU ROOM DAILY

## 2024-02-19 PROCEDURE — 85520 HEPARIN ASSAY: CPT | Performed by: HOSPITALIST

## 2024-02-19 PROCEDURE — 76705 ECHO EXAM OF ABDOMEN: CPT | Performed by: RADIOLOGY

## 2024-02-19 PROCEDURE — 94762 N-INVAS EAR/PLS OXIMTRY CONT: CPT

## 2024-02-19 PROCEDURE — 82947 ASSAY GLUCOSE BLOOD QUANT: CPT

## 2024-02-19 PROCEDURE — 9420000001 HC RT PATIENT EDUCATION 5 MIN

## 2024-02-19 PROCEDURE — 83605 ASSAY OF LACTIC ACID: CPT | Performed by: EMERGENCY MEDICINE

## 2024-02-19 PROCEDURE — 93005 ELECTROCARDIOGRAM TRACING: CPT

## 2024-02-19 PROCEDURE — 2500000005 HC RX 250 GENERAL PHARMACY W/O HCPCS: Performed by: INTERNAL MEDICINE

## 2024-02-19 PROCEDURE — 71045 X-RAY EXAM CHEST 1 VIEW: CPT

## 2024-02-19 PROCEDURE — 2500000004 HC RX 250 GENERAL PHARMACY W/ HCPCS (ALT 636 FOR OP/ED): Performed by: HOSPITALIST

## 2024-02-19 PROCEDURE — 74176 CT ABD & PELVIS W/O CONTRAST: CPT

## 2024-02-19 PROCEDURE — 2500000004 HC RX 250 GENERAL PHARMACY W/ HCPCS (ALT 636 FOR OP/ED): Performed by: EMERGENCY MEDICINE

## 2024-02-19 PROCEDURE — 96365 THER/PROPH/DIAG IV INF INIT: CPT

## 2024-02-19 PROCEDURE — 93306 TTE W/DOPPLER COMPLETE: CPT | Performed by: STUDENT IN AN ORGANIZED HEALTH CARE EDUCATION/TRAINING PROGRAM

## 2024-02-19 PROCEDURE — 93306 TTE W/DOPPLER COMPLETE: CPT

## 2024-02-19 PROCEDURE — 71045 X-RAY EXAM CHEST 1 VIEW: CPT | Performed by: STUDENT IN AN ORGANIZED HEALTH CARE EDUCATION/TRAINING PROGRAM

## 2024-02-19 PROCEDURE — 76705 ECHO EXAM OF ABDOMEN: CPT

## 2024-02-19 PROCEDURE — 83735 ASSAY OF MAGNESIUM: CPT | Performed by: EMERGENCY MEDICINE

## 2024-02-19 PROCEDURE — 2500000001 HC RX 250 WO HCPCS SELF ADMINISTERED DRUGS (ALT 637 FOR MEDICARE OP): Performed by: EMERGENCY MEDICINE

## 2024-02-19 PROCEDURE — 85025 COMPLETE CBC W/AUTO DIFF WBC: CPT | Performed by: EMERGENCY MEDICINE

## 2024-02-19 PROCEDURE — 83880 ASSAY OF NATRIURETIC PEPTIDE: CPT | Performed by: EMERGENCY MEDICINE

## 2024-02-19 PROCEDURE — 87636 SARSCOV2 & INF A&B AMP PRB: CPT | Performed by: EMERGENCY MEDICINE

## 2024-02-19 PROCEDURE — 87040 BLOOD CULTURE FOR BACTERIA: CPT | Mod: SAMLAB | Performed by: EMERGENCY MEDICINE

## 2024-02-19 PROCEDURE — 81001 URINALYSIS AUTO W/SCOPE: CPT | Performed by: EMERGENCY MEDICINE

## 2024-02-19 PROCEDURE — 85730 THROMBOPLASTIN TIME PARTIAL: CPT | Performed by: HOSPITALIST

## 2024-02-19 PROCEDURE — 99291 CRITICAL CARE FIRST HOUR: CPT | Performed by: EMERGENCY MEDICINE

## 2024-02-19 PROCEDURE — 80048 BASIC METABOLIC PNL TOTAL CA: CPT | Performed by: EMERGENCY MEDICINE

## 2024-02-19 PROCEDURE — 2500000002 HC RX 250 W HCPCS SELF ADMINISTERED DRUGS (ALT 637 FOR MEDICARE OP, ALT 636 FOR OP/ED): Performed by: EMERGENCY MEDICINE

## 2024-02-19 PROCEDURE — 36415 COLL VENOUS BLD VENIPUNCTURE: CPT | Performed by: EMERGENCY MEDICINE

## 2024-02-19 PROCEDURE — 2500000001 HC RX 250 WO HCPCS SELF ADMINISTERED DRUGS (ALT 637 FOR MEDICARE OP): Performed by: HOSPITALIST

## 2024-02-19 PROCEDURE — 85610 PROTHROMBIN TIME: CPT | Performed by: HOSPITALIST

## 2024-02-19 PROCEDURE — 2500000002 HC RX 250 W HCPCS SELF ADMINISTERED DRUGS (ALT 637 FOR MEDICARE OP, ALT 636 FOR OP/ED)

## 2024-02-19 PROCEDURE — 84484 ASSAY OF TROPONIN QUANT: CPT | Performed by: HOSPITALIST

## 2024-02-19 PROCEDURE — 94640 AIRWAY INHALATION TREATMENT: CPT

## 2024-02-19 PROCEDURE — 85520 HEPARIN ASSAY: CPT | Performed by: INTERNAL MEDICINE

## 2024-02-19 PROCEDURE — 84484 ASSAY OF TROPONIN QUANT: CPT | Performed by: EMERGENCY MEDICINE

## 2024-02-19 PROCEDURE — 74176 CT ABD & PELVIS W/O CONTRAST: CPT | Performed by: RADIOLOGY

## 2024-02-19 RX ORDER — ENEMA 19; 7 G/133ML; G/133ML
1 ENEMA RECTAL DAILY PRN
COMMUNITY

## 2024-02-19 RX ORDER — FERROUS SULFATE 325(65) MG
65 TABLET, DELAYED RELEASE (ENTERIC COATED) ORAL 2 TIMES DAILY
Status: DISCONTINUED | OUTPATIENT
Start: 2024-02-19 | End: 2024-02-19

## 2024-02-19 RX ORDER — ASPIRIN 81 MG/1
81 TABLET ORAL NIGHTLY
Status: DISCONTINUED | OUTPATIENT
Start: 2024-02-19 | End: 2024-02-26 | Stop reason: HOSPADM

## 2024-02-19 RX ORDER — BISACODYL 10 MG/1
10 SUPPOSITORY RECTAL DAILY PRN
Status: DISCONTINUED | OUTPATIENT
Start: 2024-02-19 | End: 2024-02-23

## 2024-02-19 RX ORDER — DEXTROSE 50 % IN WATER (D50W) INTRAVENOUS SYRINGE
25
Status: DISCONTINUED | OUTPATIENT
Start: 2024-02-19 | End: 2024-02-26 | Stop reason: HOSPADM

## 2024-02-19 RX ORDER — ACETAMINOPHEN 325 MG/1
650 TABLET ORAL EVERY 4 HOURS PRN
Status: DISCONTINUED | OUTPATIENT
Start: 2024-02-19 | End: 2024-02-23

## 2024-02-19 RX ORDER — HEPARIN SODIUM 5000 [USP'U]/ML
4000 INJECTION, SOLUTION INTRAVENOUS; SUBCUTANEOUS ONCE
Status: DISCONTINUED | OUTPATIENT
Start: 2024-02-19 | End: 2024-02-19

## 2024-02-19 RX ORDER — NOREPINEPHRINE BITARTRATE/D5W 8 MG/250ML
.01-1 PLASTIC BAG, INJECTION (ML) INTRAVENOUS CONTINUOUS
Status: DISCONTINUED | OUTPATIENT
Start: 2024-02-19 | End: 2024-02-20

## 2024-02-19 RX ORDER — DULOXETIN HYDROCHLORIDE 60 MG/1
60 CAPSULE, DELAYED RELEASE ORAL DAILY
Status: DISCONTINUED | OUTPATIENT
Start: 2024-02-19 | End: 2024-02-26 | Stop reason: HOSPADM

## 2024-02-19 RX ORDER — SENNOSIDES 8.6 MG/1
1 TABLET ORAL 2 TIMES DAILY
Status: DISCONTINUED | OUTPATIENT
Start: 2024-02-19 | End: 2024-02-26 | Stop reason: HOSPADM

## 2024-02-19 RX ORDER — IPRATROPIUM BROMIDE AND ALBUTEROL SULFATE 2.5; .5 MG/3ML; MG/3ML
SOLUTION RESPIRATORY (INHALATION)
Status: COMPLETED
Start: 2024-02-19 | End: 2024-02-19

## 2024-02-19 RX ORDER — IPRATROPIUM BROMIDE AND ALBUTEROL SULFATE 2.5; .5 MG/3ML; MG/3ML
3 SOLUTION RESPIRATORY (INHALATION) ONCE
Status: COMPLETED | OUTPATIENT
Start: 2024-02-19 | End: 2024-02-19

## 2024-02-19 RX ORDER — POLYETHYLENE GLYCOL 3350 17 G/17G
17 POWDER, FOR SOLUTION ORAL DAILY
Status: DISCONTINUED | OUTPATIENT
Start: 2024-02-19 | End: 2024-02-26 | Stop reason: HOSPADM

## 2024-02-19 RX ORDER — HEPARIN SODIUM 10000 [USP'U]/100ML
0-4500 INJECTION, SOLUTION INTRAVENOUS CONTINUOUS
Status: DISCONTINUED | OUTPATIENT
Start: 2024-02-19 | End: 2024-02-20

## 2024-02-19 RX ORDER — ONDANSETRON HYDROCHLORIDE 2 MG/ML
4 INJECTION, SOLUTION INTRAVENOUS EVERY 8 HOURS PRN
Status: DISCONTINUED | OUTPATIENT
Start: 2024-02-19 | End: 2024-02-26 | Stop reason: HOSPADM

## 2024-02-19 RX ORDER — SODIUM CHLORIDE 9 MG/ML
125 INJECTION, SOLUTION INTRAVENOUS CONTINUOUS
Status: DISCONTINUED | OUTPATIENT
Start: 2024-02-19 | End: 2024-02-20

## 2024-02-19 RX ORDER — PROCHLORPERAZINE EDISYLATE 5 MG/ML
INJECTION INTRAMUSCULAR; INTRAVENOUS
Status: COMPLETED
Start: 2024-02-19 | End: 2024-02-19

## 2024-02-19 RX ORDER — NYSTATIN 100000 [USP'U]/G
1 POWDER TOPICAL 2 TIMES DAILY
Status: DISCONTINUED | OUTPATIENT
Start: 2024-02-19 | End: 2024-02-26 | Stop reason: HOSPADM

## 2024-02-19 RX ORDER — BISACODYL 10 MG/1
10 SUPPOSITORY RECTAL DAILY PRN
COMMUNITY

## 2024-02-19 RX ORDER — ADHESIVE BANDAGE
30 BANDAGE TOPICAL DAILY PRN
COMMUNITY

## 2024-02-19 RX ORDER — FERROUS SULFATE 325(65) MG
65 TABLET ORAL 2 TIMES DAILY
Status: DISCONTINUED | OUTPATIENT
Start: 2024-02-19 | End: 2024-02-23

## 2024-02-19 RX ORDER — ACETAMINOPHEN 650 MG/1
650 SUPPOSITORY RECTAL EVERY 4 HOURS PRN
Status: DISCONTINUED | OUTPATIENT
Start: 2024-02-19 | End: 2024-02-23

## 2024-02-19 RX ORDER — ACETAMINOPHEN 650 MG/1
650 SUPPOSITORY RECTAL ONCE
Status: COMPLETED | OUTPATIENT
Start: 2024-02-19 | End: 2024-02-19

## 2024-02-19 RX ORDER — ATORVASTATIN CALCIUM 40 MG/1
40 TABLET, FILM COATED ORAL DAILY
Status: DISCONTINUED | OUTPATIENT
Start: 2024-02-19 | End: 2024-02-26 | Stop reason: HOSPADM

## 2024-02-19 RX ORDER — PROCHLORPERAZINE EDISYLATE 5 MG/ML
5 INJECTION INTRAMUSCULAR; INTRAVENOUS ONCE
Status: COMPLETED | OUTPATIENT
Start: 2024-02-19 | End: 2024-02-19

## 2024-02-19 RX ORDER — HEPARIN SODIUM 5000 [USP'U]/ML
3000-6000 INJECTION, SOLUTION INTRAVENOUS; SUBCUTANEOUS EVERY 4 HOURS PRN
Status: DISCONTINUED | OUTPATIENT
Start: 2024-02-19 | End: 2024-02-20

## 2024-02-19 RX ORDER — OXYMETAZOLINE HCL 0.05 %
2 SPRAY, NON-AEROSOL (ML) NASAL EVERY 4 HOURS PRN
COMMUNITY

## 2024-02-19 RX ORDER — ACETAMINOPHEN 500 MG
2000 TABLET ORAL DAILY
COMMUNITY
End: 2024-02-19 | Stop reason: ENTERED-IN-ERROR

## 2024-02-19 RX ORDER — HEPARIN SODIUM 5000 [USP'U]/ML
2000-4000 INJECTION, SOLUTION INTRAVENOUS; SUBCUTANEOUS EVERY 4 HOURS PRN
Status: DISCONTINUED | OUTPATIENT
Start: 2024-02-19 | End: 2024-02-19

## 2024-02-19 RX ORDER — POTASSIUM CHLORIDE 20 MEQ/1
20 TABLET, EXTENDED RELEASE ORAL DAILY
Status: DISCONTINUED | OUTPATIENT
Start: 2024-02-19 | End: 2024-02-20

## 2024-02-19 RX ORDER — NOREPINEPHRINE BITARTRATE/D5W 8 MG/250ML
.01-1 PLASTIC BAG, INJECTION (ML) INTRAVENOUS CONTINUOUS
Status: DISCONTINUED | OUTPATIENT
Start: 2024-02-19 | End: 2024-02-19

## 2024-02-19 RX ORDER — POLYETHYLENE GLYCOL 3350 17 G/17G
17 POWDER, FOR SOLUTION ORAL DAILY
COMMUNITY

## 2024-02-19 RX ORDER — GABAPENTIN 300 MG/1
300 CAPSULE ORAL 3 TIMES DAILY
Status: DISCONTINUED | OUTPATIENT
Start: 2024-02-19 | End: 2024-02-26 | Stop reason: HOSPADM

## 2024-02-19 RX ORDER — HEPARIN SODIUM 10000 [USP'U]/100ML
0-4000 INJECTION, SOLUTION INTRAVENOUS CONTINUOUS
Status: DISCONTINUED | OUTPATIENT
Start: 2024-02-19 | End: 2024-02-19

## 2024-02-19 RX ORDER — ACETAMINOPHEN 160 MG/5ML
650 SOLUTION ORAL EVERY 4 HOURS PRN
Status: DISCONTINUED | OUTPATIENT
Start: 2024-02-19 | End: 2024-02-23

## 2024-02-19 RX ORDER — FAMOTIDINE 10 MG/ML
20 INJECTION INTRAVENOUS DAILY
Status: DISCONTINUED | OUTPATIENT
Start: 2024-02-19 | End: 2024-02-23

## 2024-02-19 RX ORDER — FAMOTIDINE 20 MG/1
20 TABLET, FILM COATED ORAL DAILY
Status: DISCONTINUED | OUTPATIENT
Start: 2024-02-19 | End: 2024-02-23

## 2024-02-19 RX ORDER — INSULIN LISPRO 100 [IU]/ML
0-5 INJECTION, SOLUTION INTRAVENOUS; SUBCUTANEOUS
Status: DISCONTINUED | OUTPATIENT
Start: 2024-02-19 | End: 2024-02-26 | Stop reason: HOSPADM

## 2024-02-19 RX ORDER — PYRITHIONE ZINC 1 %
1 SHAMPOO TOPICAL 4 TIMES DAILY PRN
COMMUNITY

## 2024-02-19 RX ORDER — DEXTROSE MONOHYDRATE 100 MG/ML
0.3 INJECTION, SOLUTION INTRAVENOUS ONCE AS NEEDED
Status: DISCONTINUED | OUTPATIENT
Start: 2024-02-19 | End: 2024-02-26 | Stop reason: HOSPADM

## 2024-02-19 RX ORDER — OXYMETAZOLINE HCL 0.05 %
2 SPRAY, NON-AEROSOL (ML) NASAL EVERY 12 HOURS PRN
Status: DISPENSED | OUTPATIENT
Start: 2024-02-19 | End: 2024-02-21

## 2024-02-19 RX ORDER — HYDROMORPHONE HYDROCHLORIDE 1 MG/ML
1 INJECTION, SOLUTION INTRAMUSCULAR; INTRAVENOUS; SUBCUTANEOUS EVERY 4 HOURS PRN
Status: DISCONTINUED | OUTPATIENT
Start: 2024-02-19 | End: 2024-02-20

## 2024-02-19 RX ORDER — MAGNESIUM SULFATE HEPTAHYDRATE 40 MG/ML
2 INJECTION, SOLUTION INTRAVENOUS ONCE
Status: COMPLETED | OUTPATIENT
Start: 2024-02-19 | End: 2024-02-19

## 2024-02-19 RX ORDER — CHOLECALCIFEROL (VITAMIN D3) 50 MCG
50 TABLET ORAL DAILY
COMMUNITY

## 2024-02-19 RX ORDER — ONDANSETRON 4 MG/1
4 TABLET, ORALLY DISINTEGRATING ORAL EVERY 8 HOURS PRN
Status: DISCONTINUED | OUTPATIENT
Start: 2024-02-19 | End: 2024-02-23

## 2024-02-19 RX ADMIN — IPRATROPIUM BROMIDE AND ALBUTEROL SULFATE 3 ML: 2.5; .5 SOLUTION RESPIRATORY (INHALATION) at 05:48

## 2024-02-19 RX ADMIN — PROCHLORPERAZINE EDISYLATE 5 MG: 5 INJECTION INTRAMUSCULAR; INTRAVENOUS at 05:45

## 2024-02-19 RX ADMIN — NOREPINEPHRINE BITARTRATE 0.01 MCG/KG/MIN: 8 INJECTION, SOLUTION INTRAVENOUS at 09:16

## 2024-02-19 RX ADMIN — PIPERACILLIN SODIUM AND TAZOBACTAM SODIUM 3.38 G: 3; .375 INJECTION, SOLUTION INTRAVENOUS at 12:03

## 2024-02-19 RX ADMIN — TAZOBACTAM SODIUM AND PIPERACILLIN SODIUM 4.5 G: 500; 4 INJECTION, SOLUTION INTRAVENOUS at 05:44

## 2024-02-19 RX ADMIN — HYDROMORPHONE HYDROCHLORIDE 0.5 MG: 1 INJECTION, SOLUTION INTRAMUSCULAR; INTRAVENOUS; SUBCUTANEOUS at 07:15

## 2024-02-19 RX ADMIN — NOREPINEPHRINE BITARTRATE 1 MCG/KG/MIN: 8 INJECTION, SOLUTION INTRAVENOUS at 11:20

## 2024-02-19 RX ADMIN — POLYVINYL ALCOHOL, POVIDONE 1 DROP: 14; 6 SOLUTION/ DROPS OPHTHALMIC at 20:15

## 2024-02-19 RX ADMIN — SODIUM CHLORIDE 1000 ML: 9 INJECTION, SOLUTION INTRAVENOUS at 05:44

## 2024-02-19 RX ADMIN — Medication 4 L/MIN: at 05:27

## 2024-02-19 RX ADMIN — GABAPENTIN 300 MG: 300 CAPSULE ORAL at 15:55

## 2024-02-19 RX ADMIN — FERROUS SULFATE TAB 325 MG (65 MG ELEMENTAL FE) 1 TABLET: 325 (65 FE) TAB at 20:15

## 2024-02-19 RX ADMIN — ACETAMINOPHEN 650 MG: 650 SUPPOSITORY RECTAL at 05:45

## 2024-02-19 RX ADMIN — GABAPENTIN 300 MG: 300 CAPSULE ORAL at 20:15

## 2024-02-19 RX ADMIN — DULOXETINE HYDROCHLORIDE 60 MG: 60 CAPSULE, DELAYED RELEASE ORAL at 17:10

## 2024-02-19 RX ADMIN — POLYVINYL ALCOHOL, POVIDONE 1 DROP: 14; 6 SOLUTION/ DROPS OPHTHALMIC at 15:56

## 2024-02-19 RX ADMIN — SODIUM CHLORIDE 125 ML/HR: 9 INJECTION, SOLUTION INTRAVENOUS at 20:16

## 2024-02-19 RX ADMIN — NOREPINEPHRINE BITARTRATE 0.45 MCG/KG/MIN: 8 INJECTION, SOLUTION INTRAVENOUS at 14:51

## 2024-02-19 RX ADMIN — ASPIRIN 81 MG: 81 TABLET, COATED ORAL at 20:15

## 2024-02-19 RX ADMIN — PIPERACILLIN SODIUM AND TAZOBACTAM SODIUM 3.38 G: 3; .375 INJECTION, SOLUTION INTRAVENOUS at 18:13

## 2024-02-19 RX ADMIN — NYSTATIN 1 APPLICATION: 100000 POWDER TOPICAL at 20:16

## 2024-02-19 RX ADMIN — HEPARIN SODIUM 1800 UNITS/HR: 10000 INJECTION, SOLUTION INTRAVENOUS at 16:35

## 2024-02-19 RX ADMIN — MAGNESIUM SULFATE HEPTAHYDRATE 2 G: 40 INJECTION, SOLUTION INTRAVENOUS at 05:25

## 2024-02-19 RX ADMIN — FAMOTIDINE 20 MG: 20 TABLET, FILM COATED ORAL at 15:55

## 2024-02-19 RX ADMIN — ATORVASTATIN CALCIUM 40 MG: 40 TABLET, FILM COATED ORAL at 15:55

## 2024-02-19 RX ADMIN — PERFLUTREN 2 ML OF DILUTION: 6.52 INJECTION, SUSPENSION INTRAVENOUS at 15:02

## 2024-02-19 RX ADMIN — SODIUM CHLORIDE 1000 ML: 9 INJECTION, SOLUTION INTRAVENOUS at 07:50

## 2024-02-19 RX ADMIN — PIPERACILLIN SODIUM AND TAZOBACTAM SODIUM 3.38 G: 3; .375 INJECTION, SOLUTION INTRAVENOUS at 23:30

## 2024-02-19 RX ADMIN — NOREPINEPHRINE BITARTRATE 0.05 MCG/KG/MIN: 8 INJECTION, SOLUTION INTRAVENOUS at 21:15

## 2024-02-19 RX ADMIN — SENNOSIDES 8.6 MG: 8.6 TABLET, FILM COATED ORAL at 20:15

## 2024-02-19 RX ADMIN — SODIUM CHLORIDE 125 ML/HR: 9 INJECTION, SOLUTION INTRAVENOUS at 15:30

## 2024-02-19 RX ADMIN — IPRATROPIUM BROMIDE AND ALBUTEROL SULFATE 3 ML: 2.5; .5 SOLUTION RESPIRATORY (INHALATION) at 05:27

## 2024-02-19 RX ADMIN — METHYLPREDNISOLONE SODIUM SUCCINATE 125 MG: 125 INJECTION INTRAMUSCULAR; INTRAVENOUS at 05:50

## 2024-02-19 RX ADMIN — SODIUM CHLORIDE 1000 ML: 9 INJECTION, SOLUTION INTRAVENOUS at 06:25

## 2024-02-19 RX ADMIN — NOREPINEPHRINE BITARTRATE 1 MCG/KG/MIN: 8 INJECTION, SOLUTION INTRAVENOUS at 12:57

## 2024-02-19 RX ADMIN — POLYETHYLENE GLYCOL 3350 17 G: 17 POWDER, FOR SOLUTION ORAL at 15:56

## 2024-02-19 SDOH — SOCIAL STABILITY: SOCIAL INSECURITY: HAS ANYONE EVER THREATENED TO HURT YOUR FAMILY OR YOUR PETS?: NO

## 2024-02-19 SDOH — SOCIAL STABILITY: SOCIAL INSECURITY: DOES ANYONE TRY TO KEEP YOU FROM HAVING/CONTACTING OTHER FRIENDS OR DOING THINGS OUTSIDE YOUR HOME?: NO

## 2024-02-19 SDOH — SOCIAL STABILITY: SOCIAL INSECURITY: ARE THERE ANY APPARENT SIGNS OF INJURIES/BEHAVIORS THAT COULD BE RELATED TO ABUSE/NEGLECT?: NO

## 2024-02-19 SDOH — SOCIAL STABILITY: SOCIAL INSECURITY: HAVE YOU HAD THOUGHTS OF HARMING ANYONE ELSE?: NO

## 2024-02-19 SDOH — SOCIAL STABILITY: SOCIAL INSECURITY: ABUSE: ADULT

## 2024-02-19 SDOH — SOCIAL STABILITY: SOCIAL INSECURITY: ARE YOU OR HAVE YOU BEEN THREATENED OR ABUSED PHYSICALLY, EMOTIONALLY, OR SEXUALLY BY ANYONE?: NO

## 2024-02-19 SDOH — SOCIAL STABILITY: SOCIAL INSECURITY: DO YOU FEEL ANYONE HAS EXPLOITED OR TAKEN ADVANTAGE OF YOU FINANCIALLY OR OF YOUR PERSONAL PROPERTY?: NO

## 2024-02-19 SDOH — SOCIAL STABILITY: SOCIAL INSECURITY: DO YOU FEEL UNSAFE GOING BACK TO THE PLACE WHERE YOU ARE LIVING?: NO

## 2024-02-19 ASSESSMENT — COGNITIVE AND FUNCTIONAL STATUS - GENERAL
MOVING FROM LYING ON BACK TO SITTING ON SIDE OF FLAT BED WITH BEDRAILS: A LITTLE
TURNING FROM BACK TO SIDE WHILE IN FLAT BAD: A LITTLE
CLIMB 3 TO 5 STEPS WITH RAILING: TOTAL
TOILETING: A LITTLE
PERSONAL GROOMING: A LITTLE
DAILY ACTIVITIY SCORE: 18
MOBILITY SCORE: 12
TURNING FROM BACK TO SIDE WHILE IN FLAT BAD: A LITTLE
HELP NEEDED FOR BATHING: A LITTLE
DAILY ACTIVITIY SCORE: 18
HELP NEEDED FOR BATHING: A LITTLE
DRESSING REGULAR UPPER BODY CLOTHING: A LITTLE
EATING MEALS: A LITTLE
DRESSING REGULAR LOWER BODY CLOTHING: A LITTLE
MOVING FROM LYING ON BACK TO SITTING ON SIDE OF FLAT BED WITH BEDRAILS: A LITTLE
MOBILITY SCORE: 12
MOVING TO AND FROM BED TO CHAIR: A LOT
DRESSING REGULAR UPPER BODY CLOTHING: A LITTLE
WALKING IN HOSPITAL ROOM: TOTAL
CLIMB 3 TO 5 STEPS WITH RAILING: TOTAL
STANDING UP FROM CHAIR USING ARMS: A LOT
PATIENT BASELINE BEDBOUND: NO
DRESSING REGULAR LOWER BODY CLOTHING: A LITTLE
WALKING IN HOSPITAL ROOM: TOTAL
EATING MEALS: A LITTLE
TOILETING: A LITTLE
PERSONAL GROOMING: A LITTLE
MOVING TO AND FROM BED TO CHAIR: A LOT
STANDING UP FROM CHAIR USING ARMS: A LOT

## 2024-02-19 ASSESSMENT — PATIENT HEALTH QUESTIONNAIRE - PHQ9
SUM OF ALL RESPONSES TO PHQ9 QUESTIONS 1 & 2: 0
1. LITTLE INTEREST OR PLEASURE IN DOING THINGS: NOT AT ALL
2. FEELING DOWN, DEPRESSED OR HOPELESS: NOT AT ALL

## 2024-02-19 ASSESSMENT — ACTIVITIES OF DAILY LIVING (ADL)
ASSISTIVE_DEVICE: WHEELCHAIR
TOILETING: INDEPENDENT
ADEQUATE_TO_COMPLETE_ADL: UNABLE TO ASSESS
FEEDING YOURSELF: INDEPENDENT
GROOMING: INDEPENDENT
PATIENT'S MEMORY ADEQUATE TO SAFELY COMPLETE DAILY ACTIVITIES?: UNABLE TO ASSESS
WALKS IN HOME: INDEPENDENT
HEARING - RIGHT EAR: FUNCTIONAL
JUDGMENT_ADEQUATE_SAFELY_COMPLETE_DAILY_ACTIVITIES: UNABLE TO ASSESS
LACK_OF_TRANSPORTATION: NO
HEARING - LEFT EAR: FUNCTIONAL
BATHING: INDEPENDENT
DRESSING YOURSELF: INDEPENDENT

## 2024-02-19 ASSESSMENT — PAIN SCALES - GENERAL
PAINLEVEL_OUTOF10: 0 - NO PAIN

## 2024-02-19 ASSESSMENT — PAIN - FUNCTIONAL ASSESSMENT
PAIN_FUNCTIONAL_ASSESSMENT: 0-10

## 2024-02-19 ASSESSMENT — COLUMBIA-SUICIDE SEVERITY RATING SCALE - C-SSRS
1. IN THE PAST MONTH, HAVE YOU WISHED YOU WERE DEAD OR WISHED YOU COULD GO TO SLEEP AND NOT WAKE UP?: NO
2. HAVE YOU ACTUALLY HAD ANY THOUGHTS OF KILLING YOURSELF?: NO
6. HAVE YOU EVER DONE ANYTHING, STARTED TO DO ANYTHING, OR PREPARED TO DO ANYTHING TO END YOUR LIFE?: NO

## 2024-02-19 ASSESSMENT — LIFESTYLE VARIABLES
SKIP TO QUESTIONS 9-10: 1
HOW OFTEN DO YOU HAVE A DRINK CONTAINING ALCOHOL: NEVER
HOW MANY STANDARD DRINKS CONTAINING ALCOHOL DO YOU HAVE ON A TYPICAL DAY: PATIENT DOES NOT DRINK
AUDIT-C TOTAL SCORE: 0
HOW OFTEN DO YOU HAVE 6 OR MORE DRINKS ON ONE OCCASION: NEVER
AUDIT-C TOTAL SCORE: 0

## 2024-02-19 NOTE — Clinical Note
8.5 fr drainage catheter placed by Dr. Barrera. Site secured with stat loc and covered with tegaderm.

## 2024-02-19 NOTE — CARE PLAN
Patient admitted to ICU at 1323, arrived on 1 mcg/kg/min norepinephrine s/p 4 L crystalloid boluses in ED, ST 110s, on RA; Norepinephrine down-titrated to 0.15 mcg/kg/min throughout shift, MAP greater than 65 mm Hg;  USGPIV 20g placed in Left AC d/t poor peripheral vascular access and norepinephrine dose-rate related phlebitis;   Yuri at bedside, educated on patient condition and plan of care    Problem: Skin  Goal: Participates in plan/prevention/treatment measures  Outcome: Progressing  Goal: Prevent/manage excess moisture  Outcome: Progressing  Goal: Prevent/minimize sheer/friction injuries  Outcome: Progressing  Goal: Promote/optimize nutrition  Outcome: Progressing  Goal: Promote skin healing  Outcome: Progressing     Problem: Infection prevention/bleeding  Goal: Infection s/sx managed  Outcome: Progressing  Goal: No further progression of infection  Outcome: Progressing     Problem: Perfusion/Cardiac  Goal: Adequate perfusion to organs/extremities  Outcome: Progressing  Goal: Hemodynamically stable  Outcome: Progressing  Goal: No cardiac arrhythmias  Outcome: Progressing     Problem: Respiratory/Oxygenation  Goal: No signs of respiratory compromise  Outcome: Progressing  Goal: Tolerates activity without increased O2 demands  Outcome: Progressing     Problem: Neuro/Coping  Goal: Minimal anxiety; utilize coping mechanisms  Outcome: Progressing  Goal: No signs of neurological compromise  Outcome: Progressing  Goal: Increase self care/family involvement  Outcome: Progressing     Problem: Fluid/Electrolyte/Nutrition  Goal: Fluid balance within 1 liter of normovolemia  Outcome: Progressing  Goal: No signs of renal failure  Outcome: Progressing  Goal: Normal electrolyte levels  Outcome: Progressing  Goal: Normal glucose levels  Outcome: Progressing  Goal: Tolerates nutritional intake  Outcome: Progressing

## 2024-02-19 NOTE — CARE PLAN
Admitted today for sepsis on levophed.  Titrating down at this time.  Pt confused but mentation appears to be improving.  Heparing needs stopped at midnight for gall bladder drain placement tomorrow at 0800.
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
## 2024-02-19 NOTE — ED PROVIDER NOTES
This patient is a 68-year-old female from a local nursing home for COVID presents with fever difficulty breathing and altered mental status starting about 2 hours ago.  Patient is currently ANO x 1 and is typically ANO x 3.  At the moment she is a poor historian.  All information comes from nursing home report or EMS report.  She does say that she is not in pain.         Review of Systems     Physical Exam  Vitals and nursing note reviewed.   Constitutional:       General: She is not in acute distress.     Appearance: She is well-developed.   HENT:      Head: Normocephalic and atraumatic.   Eyes:      Conjunctiva/sclera: Conjunctivae normal.   Cardiovascular:      Rate and Rhythm: Regular rhythm. Tachycardia present.      Heart sounds: No murmur heard.  Pulmonary:      Effort: Tachypnea, accessory muscle usage, prolonged expiration and respiratory distress present.      Comments: Bilateral diminished breath sounds with diffuse expiratory wheezes.  Abdominal:      Palpations: Abdomen is soft.      Tenderness: There is no abdominal tenderness.   Musculoskeletal:         General: No swelling.      Cervical back: Neck supple.   Skin:     General: Skin is warm and dry.      Capillary Refill: Capillary refill takes less than 2 seconds.   Neurological:      Mental Status: She is alert.   Psychiatric:         Mood and Affect: Mood normal.          Labs Reviewed   CBC WITH AUTO DIFFERENTIAL - Abnormal       Result Value    WBC 4.4      nRBC 0.0      RBC 4.47      Hemoglobin 13.4      Hematocrit 43.2      MCV 97      MCH 30.0      MCHC 31.0 (*)     RDW 13.5      Platelets 201      Neutrophils % 87.9      Immature Granulocytes %, Automated 0.2      Lymphocytes % 10.9      Monocytes % 0.5      Eosinophils % 0.5      Basophils % 0.0      Neutrophils Absolute 3.87      Immature Granulocytes Absolute, Automated 0.01      Lymphocytes Absolute 0.48 (*)     Monocytes Absolute 0.02 (*)     Eosinophils Absolute 0.02      Basophils  Absolute 0.00     BASIC METABOLIC PANEL - Abnormal    Glucose 88      Sodium 135 (*)     Potassium 5.0      Chloride 102      Bicarbonate 22      Anion Gap 16      Urea Nitrogen 27 (*)     Creatinine 1.30 (*)     eGFR 45 (*)     Calcium 9.2     TROPONIN I, HIGH SENSITIVITY - Abnormal    Troponin I, High Sensitivity 23 (*)     Narrative:     Less than 99th percentile of normal range cutoff-  Female and children under 18 years old <14 ng/L; Male <21 ng/L: Negative  Repeat testing should be performed if clinically indicated.     Female and children under 18 years old 14-50 ng/L; Male 21-50 ng/L:  Consistent with possible cardiac damage and possible increased clinical   risk. Serial measurements may help to assess extent of myocardial damage.     >50 ng/L: Consistent with cardiac damage, increased clinical risk and  myocardial infarction. Serial measurements may help assess extent of   myocardial damage.      NOTE: Children less than 1 year old may have higher baseline troponin   levels and results should be interpreted in conjunction with the overall   clinical context.     NOTE: Troponin I testing is performed using a different   testing methodology at [facility] than at other   [facility]. Direct result comparisons should only   be made within the same method.   B-TYPE NATRIURETIC PEPTIDE - Abnormal     (*)     Narrative:        <100 pg/mL - Heart failure unlikely  100-299 pg/mL - Intermediate probability of acute heart                  failure exacerbation. Correlate with clinical                  context and patient history.    >=300 pg/mL - Heart Failure likely. Correlate with clinical                  context and patient history.    BNP testing is performed using different testing methodology at [facility] than at other [facility]. Direct result comparisons should only be made within the same method.      LACTATE - Abnormal    Lactate 4.9 (*)     Narrative:      Venipuncture immediately after or during the administration of Metamizole may lead to falsely low results. Testing should be performed immediately  prior to Metamizole dosing.   MAGNESIUM - Normal    Magnesium 1.67     BLOOD CULTURE   BLOOD CULTURE   URINALYSIS WITH REFLEX CULTURE AND MICROSCOPIC    Narrative:     The following orders were created for panel order Urinalysis with Reflex Culture and Microscopic.  Procedure                               Abnormality         Status                     ---------                               -----------         ------                     Urinalysis with Reflex C...[[id number]]                                                 Extra Urine Gray Tube[[id number]]                                                         Please view results for these tests on the individual orders.   URINALYSIS WITH REFLEX CULTURE AND MICROSCOPIC   EXTRA URINE GRAY TUBE   SARS-COV-2 AND INFLUENZA A/B PCR   LACTATE        XR chest 1 view   Final Result   1.  No evidence of acute cardiopulmonary process. Cardiomegaly.             Signed by: Michael Hopson 2/19/2024 6:10 AM   Dictation workstation:   [id number]      CT chest abdomen pelvis wo IV contrast    (Results Pending)        Procedures     Medical Decision Making  68-year-old female presents via EMS from a local nursing home with chief report of altered mental status.  She is demonstrating tachycardia and tachypnea as well.  She admits she is having difficulty breathing.  Yesterday she had some abdominal pain but has none now.  Overall she is better than her original presentation.  She is currently ANO x 3 and she was ANO x 1.  She is answering questions appropriately at this moment.  Patient's chest x-ray was negative for acute findings such as pneumonia.  However been her difficulty breathing and abdominal pain a CT scan of the chest abdomen pelvis has been ordered.  Urinalysis is pending.  White cell count is normal.  Upon arrival IV access  was established she was placed on a monitor which demonstrated sinus tachycardia.  She is currently receiving her second liter of normal saline and has received 4.5 g of Zosyn.    Amount and/or Complexity of Data Reviewed  ECG/medicine tests: independent interpretation performed.     Details: Sinus tachycardia rate of 137, narrow complex, normal axis, no ST elevation or depression, no ectopy.         Diagnoses as of 02/20/24 0634   Septic shock (CMS/[facility])   Urinary tract infection without hematuria, site unspecified   Cholecystitis                    Michael Garg MD  02/20/24 0657

## 2024-02-19 NOTE — PROGRESS NOTES
Emergency Medicine Transition of Care Note.    I received Hannah Pedraza in signout from Dr. Garg.  Please see the previous ED provider note for all HPI, PE and MDM up to the time of signout at 0700. This is in addition to the primary record.    In brief Hannah Pedraza is an 68 y.o. female presenting for   Chief Complaint   Patient presents with    Altered Mental Status     Sent for reported tremors, irregular breathing, altered mental status x 2 hours      At the time of signout we were awaiting: Lab work and imaging    Diagnoses as of 02/19/24 1857   Septic shock (CMS/HCC)   Urinary tract infection without hematuria, site unspecified   Cholecystitis       Medical Decision Making  68-year-old female presents from nursing facility with altered mental status. Found to be febrile. Sepsis workup initiated. Elevated lactate. Treated with 2 L with an increasing lactate. Given third liter with decreasing lactate.  Following 30 mL/kg fluid bolus patient remained hypotensive.  Her mentation and remained steady.  Placed on Levophed. Fourth liter given. Blood pressure maintaining currently at 115 systolic on Levophed. Heart rate went from 110-120 which could be secondary to the vasopressor. Viral swabs negative. Patient treated with Zosyn. CT of the abdomen equivocal for cholecystitis. Right upper quadrant ultrasound shows another equivocal exam for cholecystitis. Case discussed with general surgeon who discussed case with interventional radiology.  They will perform a cholecystostomy tube tomorrow.  Patient was given second dose of Zosyn prior to admission to the intensive care unit.  Stable at time of admission.    Final diagnoses:   [A41.9, R65.21] Septic shock (CMS/HCC)   [N39.0] Urinary tract infection without hematuria, site unspecified   [K81.9] Cholecystitis           Procedure  Critical Care    Performed by: Sarabjit Hart DO  Authorized by: Michael Garg MD    Critical care provider statement:      Critical care time (minutes):  65    Critical care time was exclusive of:  Separately billable procedures and treating other patients    Critical care was necessary to treat or prevent imminent or life-threatening deterioration of the following conditions:  Sepsis and shock    Critical care was time spent personally by me on the following activities:  Development of treatment plan with patient or surrogate, discussions with consultants, obtaining history from patient or surrogate, ordering and performing treatments and interventions, ordering and review of laboratory studies, ordering and review of radiographic studies and re-evaluation of patient's condition    Care discussed with: admitting provider        Sarabjit Bridges DO

## 2024-02-19 NOTE — H&P
History Of Present Illness  Hannah Pedraza is a 68 y.o. female presenting with abdominal pain, nausea as well fever.  Symptoms started 1 day prior and patient is very confused.  CT abdomen pelvis as well ultrasound showing possible acute cholecystitis besides urinary tract infection evident on urinalysis as well.  Patient denies any shortness of breath or cough or hemoptysis or wheeze or chest pain.  No active bleeding.  She has lactic acidosis with hypotension, PAOLA, dehydration with elevated creatinine, tachycardia.  She was given 3 L of normal saline in the ER without significant response and hence on Levophed found to be in septic shock.  She has left-sided renal calculus 3 mm and no hydronephrosis.  Surgery and IR consulted.  She is on IV antibiotics and sepsis protocol had been initiated.  No skin rash apparently or joint pains or muscle aches or bodyaches.  Patient unable to give much history with her mentation.  Cannot tell whether she had back pain or flank pain or hematuria or dysuria.  She follows commands but somewhat lethargic with metabolic encephalopathy currently maintaining vital saturations.  Flu and COVID-19 test negative.  No vomiting or diarrhea or constipation.  No runny nose sore throat or sinus or nasal congestion.  Abdominal pain diffuse without radiation to back.  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 No pertinent surgical history.     Social History  Unable to obtain at this time but denies    Family History  Denies any pertinent family history at this time     Allergies  Morphine    Review of Systems  All other 12 point review of systems negative except HPI  Physical Exam   General Appearance: AAO x 1, not in acute distress  Skin: skin color pink, warm, and dry; no suspicious rashes or lesions  Eyes : PERRL, EOM's intact  ENT: mucous membranes pink and moist  Neck: normocephalic  Respiratory: Slightly diminished  Heart: regular rate and rhythm.  Abdomen: Nondistended, positive bowel sounds x4, soft,  nontender  Extremities: no edema   Peripheral pulses: normal x4 extremities  Neuro: Confused, no focal motor deficits  Last Recorded Vitals  Blood pressure 125/76, pulse (!) 114, temperature 37 °C (98.6 °F), temperature source Temporal, resp. rate 18, height 1.499 m (4' 11\"), weight 95.2 kg (209 lb 14.1 oz), SpO2 94 %.    Relevant Results  Scheduled medications     Continuous medications  norepinephrine, 0.01-1 mcg/kg/min, Last Rate: 0.7 mcg/kg/min (02/19/24 1352)      PRN medications  PRN medications: oxygen     US gallbladder    Result Date: 2/19/2024  Interpreted By:  Rody Warner, STUDY: US GALLBLADDER;  2/19/2024 10:09 am   INDICATION: Signs/Symptoms:sepsis.   COMPARISON: CT abdomen and pelvis obtained earlier the same day   ACCESSION NUMBER(S): [id number]   ORDERING CLINICIAN: LIA DE LA TORRE   TECHNIQUE: Multiple images of the right upper quadrant were obtained.   FINDINGS: LIVER:  Right lobe measures 18.3 cm in craniocaudal length. Slightly increased echogenicity. intact hepatopetal flow on brief duplex Doppler evaluation of the main portal vein. No focal lesion demonstrated.   GALLBLADDER:   Borderline dilated measuring 4.7 cm transversely with roughly 2 cm echogenic shadowing stone and subtle nonshadowing luminal echoes. " Anterior wall is thickened at 6 mm. Sonographic Espinoza sign is indeterminate due to patient sedation.   BILIARY TREE: The common duct measures  8 mm.   PANCREAS:   Largely obscured by overlying bowel gas.   RIGHT KIDNEY:   Normal in size. No hydronephrosis. 1.1 cm cyst in the inferior pole.       Shadowing stone and sludge within borderline dilated thick-walled gallbladder. Equivocal Espinoza sign due to patient sedation. Acute cholecystitis not excluded. Clinical correlation recommended.   Enlarged slightly echogenic likely fatty infiltrated liver.   Small right renal cyst.   The pancreas is not adequately visualized on this exam.   MACRO: None.   Signed by: Rody Warner 2/19/2024 11:25 AM Dictation workstation:   [id number]    CT chest abdomen pelvis wo IV contrast    Result Date: 2/19/2024  Interpreted By:  Jonny Owen, STUDY: CT CHEST ABDOMEN PELVIS WO CONTRAST;  2/19/2024 6:58 am   INDICATION: Signs/Symptoms:Fever, report of abdominal pain.   COMPARISON: None.   ACCESSION NUMBER(S): [id number]   ORDERING CLINICIAN: ANNA CRAVEN   TECHNIQUE: Contiguous axial CT sections are performed from the thoracic inlet to the lesser trochanters without intravenous or enteric contrast. The study is supplemented with coronal and sagittal reformatted images.   FINDINGS: The lungs are limited by motion degradation. There is faint nonspecific ground-glass density in both lungs and some basilar dependent edema or atelectasis in the left posterior costophrenic angle. Subsegmental atelectasis is also identified inferiorly in the lingula. There is otherwise no airspace consolidation.   There is a 7 mm bulla in the left upper lobe.   There are atherosclerotic arterial calcifications of the thoracic aorta and coronary arteries. The aorta is of normal caliber. There is no sign of pathologic lymph node enlargement in the mediastinum, pulmonary ashlee, or axilla. There is no mediastinal fluid collection or pneumothorax. There is  no pericardial or pleural effusion.   There is a small hiatal hernia.   The gallbladder is distended. Partially calcified gallstones are identified within the gallbladder fundus and the dependent gallbladder body towards the neck. This appearance is unchanged from 10/11/2023. A mild degree of gallbladder prominence can not be excluded this appearance could be related to motion degradation.   The liver, spleen, pancreas, and left adrenal gland are of normal CT appearance. There is nodular enlargement of the right adrenal gland at the posterior limb measuring 2.0 x 1.4 cm in diameter. The appearance is similar to the previous study.   The kidneys are symmetric in size. Vascular calcifications are identified in both renal ashlee. There is suspected 3 mm calculus in the mid left kidney. There is a larger 7 x 5 mm calculus in the left renal pelvis just proximal to the UPJ. There is left perinephric stranding and trace fluid in some thickening of the perirenal fascia. There is trace fluid and increased density in the left pericolic gutter. There is minimal periureteral stranding on the left. There is a 3 mm calcification along the course of the distal left ureter which is unchanged from the previous study and more likely represents a vascular calcification. There is no right-sided hydronephrosis or hydroureter. There is no obstructing right ureteral calculus.   There are diffuse atherosclerotic arterial calcifications of the abdominal aorta. The aorta is of normal caliber. There is no periaortic mass or fluid collection. There is no pathologic lymph node enlargement along the iliac chains or in the groin soft tissues.   There is no free air collection in the abdomen or pelvis.   There is no bowel distension. Multiple diverticula are identified in the distal colon at the descending and sigmoid colon. The appendix is of normal appearance. The rectum is filled with stool and mildly distended. There is no wall thickening.   The  urinary bladder is partially distended and unremarkable. There is no bladder calculus or wall thickening. The pelvic viscera are unremarkable. There is no herniated bowel.   There are multilevel discogenic degenerative changes of the thoracolumbar spine with diffuse osteopenia. These findings are more pronounced within the lumbar spine and greater at L2-3 and L3-4, unchanged from 10/11/2023. There is persistent posterior subluxation of L3 and anterior subluxation of L4 which is likely degenerative. There is central canal narrowing at L2-3 and L3-4 and bilateral neural foraminal narrowing, greater at from L3 through S1. There are osteoarthritic changes of both hips and sacroiliac joints. There is no definite lytic lesion. There is chronic appearing fracture/deformity of the medial right clavicle and degenerative changes of both shoulders. The remaining visualized osseous structures are grossly intact         Mild atherosclerotic arterial calcifications of the coronary arteries and thoracic aorta.   Small hiatal hernia.   Mildly distended gallbladder with cholelithiasis. There is mild wall prominence which is more likely related to motion. No surrounding fluid is identified. This can be further evaluated with gallbladder ultrasound.   Left perinephric stranding and some increased density in the left pericolic gutter. Consider intermittent obstructive uropathy at the UPJ. There is currently no significant hydronephrosis. In the setting of abdominal pain and fever, correlate with urinalysis to exclude underlying urinary tract infection.   Distal colonic diverticulosis. There is some increased density in the pericolic gutter with equivocal wall thickening of the mid descending colon. Mild degree of descending diverticulitis is suspected in the clinical setting of abdominal pain and fever. Continued surveillance is recommended.   Normal appendix.   Increased stool within the rectum with mild rectal distention. There is no  wall thickening.   Severe multilevel discogenic degenerative changes of the lumbar spine with central canal and neural foraminal stenosis in the lower lumbar spine unchanged from 10/11/2023.   3 mm left renal calculus.   Signed by: Jonny Owen 2/19/2024 7:33 AM Dictation workstation:   [id number]    ECG 12 lead    Result Date: 2/19/2024  Undetermined rhythm Low voltage QRS Cannot rule out Anterior infarct , age undetermined Abnormal ECG When compared with ECG of 11-OCT-2023 01:57, Current undetermined rhythm precludes rhythm comparison, needs review Borderline criteria for Inferior infarct are no longer Present ST now depressed in Inferior leads ST less depressed in Lateral leads    XR chest 1 view    Result Date: 2/19/2024  Interpreted By:  Michael Hopson, STUDY: XR CHEST 1 VIEW;  2/19/2024 5:52 am   INDICATION: Signs/Symptoms:Altered mental status.   COMPARISON: Chest radiograph dated 05/24/2023.   ACCESSION NUMBER(S): [id number]   ORDERING CLINICIAN: ANNA CRAVEN   FINDINGS:   CARDIOMEDIASTINAL SILHOUETTE: Cardiomediastinal silhouette is enlarged and stable..   LUNGS/PLEURA: There are no consolidations.There are no pleural effusions. There is no demonstrated pneumothorax.     BONES: No evidence of acute osseous abnormality. Partially visualized cervical spine instrumentation.       1.  No evidence of acute cardiopulmonary process. Cardiomegaly.     Signed by: Michael Hopson 2/19/2024 6:10 AM Dictation workstation:   [id number]      Results for orders placed or performed during the hospital encounter of 02/19/24 (from the past 24 hour(s))   ECG 12 lead   Result Value Ref Range    Ventricular Rate 137 BPM    Atrial Rate 136 BPM    NJ Interval 170 ms    QRS Duration 72 ms    QT Interval 280 ms    QTC Calculation(Bazett) 422 ms    P Axis -56 degrees    R Axis 28 degrees    T Axis 38 degrees    QRS Count 23 beats    Q Onset 224 ms    P Onset 139 ms    P Offset 199 ms    T Offset 364 ms    QTC Fredericia 368  ms   CBC and Auto Differential   Result Value Ref Range    WBC 4.4 4.4 - 11.3 x10*3/uL    nRBC 0.0 0.0 - 0.0 /100 WBCs    RBC 4.47 4.00 - 5.20 x10*6/uL    Hemoglobin 13.4 12.0 - 16.0 g/dL    Hematocrit 43.2 36.0 - 46.0 %    MCV 97 80 - 100 fL    MCH 30.0 26.0 - 34.0 pg    MCHC 31.0 (L) 32.0 - 36.0 g/dL    RDW 13.5 11.5 - 14.5 %    Platelets 201 150 - 450 x10*3/uL    Neutrophils % 87.9 40.0 - 80.0 %    Immature Granulocytes %, Automated 0.2 0.0 - 0.9 %    Lymphocytes % 10.9 13.0 - 44.0 %    Monocytes % 0.5 2.0 - 10.0 %    Eosinophils % 0.5 0.0 - 6.0 %    Basophils % 0.0 0.0 - 2.0 %    Neutrophils Absolute 3.87 1.20 - 7.70 x10*3/uL    Immature Granulocytes Absolute, Automated 0.01 0.00 - 0.70 x10*3/uL    Lymphocytes Absolute 0.48 (L) 1.20 - 4.80 x10*3/uL    Monocytes Absolute 0.02 (L) 0.10 - 1.00 x10*3/uL    Eosinophils Absolute 0.02 0.00 - 0.70 x10*3/uL    Basophils Absolute 0.00 0.00 - 0.10 x10*3/uL   Basic metabolic panel   Result Value Ref Range    Glucose 88 74 - 99 mg/dL    Sodium 135 (L) 136 - 145 mmol/L    Potassium 5.0 3.5 - 5.3 mmol/L    Chloride 102 98 - 107 mmol/L    Bicarbonate 22 21 - 32 mmol/L    Anion Gap 16 10 - 20 mmol/L    Urea Nitrogen 27 (H) 6 - 23 mg/dL    Creatinine 1.30 (H) 0.50 - 1.05 mg/dL    eGFR 45 (L) >60 mL/min/1.73m*2    Calcium 9.2 8.6 - 10.3 mg/dL   Magnesium   Result Value Ref Range    Magnesium 1.67 1.60 - 2.40 mg/dL   Troponin I, High Sensitivity   Result Value Ref Range    Troponin I, High Sensitivity 23 (H) 0 - 13 ng/L   B-Type Natriuretic Peptide   Result Value Ref Range     (H) 0 - 99 pg/mL   Lactate   Result Value Ref Range    Lactate 4.9 (HH) 0.4 - 2.0 mmol/L   Blood Culture    Specimen: Peripheral Venipuncture; Blood culture   Result Value Ref Range    Blood Culture Loaded on Instrument - Culture in progress    Sars-CoV-2 and Influenza A/B PCR   Result Value Ref Range    Flu A Result Not Detected Not Detected    Flu B Result Not Detected Not Detected    Coronavirus  2019, PCR Not Detected Not Detected   Lactate   Result Value Ref Range    Lactate 5.0 (HH) 0.4 - 2.0 mmol/L   SST TOP   Result Value Ref Range    Extra Tube Hold for add-ons.    Urinalysis with Reflex Culture and Microscopic   Result Value Ref Range    Color, Urine Rita (N) Straw, Yellow    Appearance, Urine Hazy (N) Clear    Specific Gravity, Urine 1.020 1.005 - 1.035    pH, Urine 5.0 5.0, 5.5, 6.0, 6.5, 7.0, 7.5, 8.0    Protein, Urine 100 (2+) (N) NEGATIVE mg/dL    Glucose, Urine NEGATIVE NEGATIVE mg/dL    Blood, Urine LARGE (3+) (A) NEGATIVE    Ketones, Urine NEGATIVE NEGATIVE mg/dL    Bilirubin, Urine NEGATIVE NEGATIVE    Urobilinogen, Urine <2.0 <2.0 mg/dL    Nitrite, Urine NEGATIVE NEGATIVE    Leukocyte Esterase, Urine MODERATE (2+) (A) NEGATIVE   Microscopic Only, Urine   Result Value Ref Range    WBC, Urine >50 (A) 1-5, NONE /HPF    WBC Clumps, Urine MANY Reference range not established. /HPF    RBC, Urine >20 (A) NONE, 1-2, 3-5 /HPF    Bacteria, Urine 4+ (A) NONE SEEN /HPF    Mucus, Urine 1+ Reference range not established. /LPF    Hyaline Casts, Urine 3+ (A) NONE /LPF    Fine Granular Casts, Urine 2+ (A) NONE /LPF   Lactate   Result Value Ref Range    Lactate 3.6 (H) 0.4 - 2.0 mmol/L   Lactate   Result Value Ref Range    Lactate 2.3 (H) 0.4 - 2.0 mmol/L   Hepatic function panel   Result Value Ref Range    Albumin 2.9 (L) 3.4 - 5.0 g/dL    Bilirubin, Total 1.9 (H) 0.0 - 1.2 mg/dL    Bilirubin, Direct 1.3 (H) 0.0 - 0.3 mg/dL    Alkaline Phosphatase 118 33 - 136 U/L    ALT 26 7 - 45 U/L    AST 38 9 - 39 U/L    Total Protein 5.4 (L) 6.4 - 8.2 g/dL         Assessment/Plan   Principal Problem:    Septic shock (CMS/HCC)    68-year-old female presented with  Septic shock  UTI  Acute cholecystitis  PAOLA  Lactic acidosis  Metabolic encephalopathy  History of hypertension  Hyperlipidemia  GERD  DVT on Eliquis  Left renal stone  Degenerative disc disease  Diabetes mellitus type 2  History of possible  CHF    Plan  Admit to ICU  Cardiopulmonary monitoring  Follow vitals including pulse ox check  On sepsis pathway  Continue IV fluids carefully and gently  On Levophed to keep MAP above 65 and wean as tolerated  Supplemental oxygen if required  Zosyn IV  Follow blood and urine cultures  Follow-up IR and surgery for cholecystostomy tube drainage  Trend lactate until normal  All labs are reviewed by me independently  Troponins  Echocardiogram  Daily CBC BMP and monitor urine output  Hold antihypertension medicines  Bowel regimen for constipation prophylaxis  Follow clinically  Supportive care symptomatic management  Dilaudid IV for pain control and antiemetics using Zofran as needed  Tylenol for fever  Heparin drip for history of DVT/PE  PPI for GI prophylaxis  Monitor fingerstick  Watch for any volume overload  Critical care time spent in managing patient excluding billable procedures greater than 50  High risk of clinical deterioration including cardiopulmonary arrest and multiorgan failure due to aforementioned reasons  High complexity decision making involved in management of the patient                Enrique Guevara MD

## 2024-02-20 ENCOUNTER — APPOINTMENT (OUTPATIENT)
Dept: RADIOLOGY | Facility: HOSPITAL | Age: 69
DRG: 853 | End: 2024-02-20
Payer: COMMERCIAL

## 2024-02-20 LAB
ALBUMIN SERPL BCP-MCNC: 3 G/DL (ref 3.4–5)
ALP SERPL-CCNC: 90 U/L (ref 33–136)
ALT SERPL W P-5'-P-CCNC: 23 U/L (ref 7–45)
ANION GAP SERPL CALC-SCNC: 14 MMOL/L (ref 10–20)
AORTIC VALVE MEAN GRADIENT: 7 MMHG
AORTIC VALVE PEAK VELOCITY: 1.62 M/S
AST SERPL W P-5'-P-CCNC: 26 U/L (ref 9–39)
AV PEAK GRADIENT: 10.5 MMHG
AVA (PEAK VEL): 1.14 CM2
AVA (VTI): 1.3 CM2
BILIRUB SERPL-MCNC: 0.6 MG/DL (ref 0–1.2)
BUN SERPL-MCNC: 26 MG/DL (ref 6–23)
CALCIUM SERPL-MCNC: 7.7 MG/DL (ref 8.6–10.3)
CHLORIDE SERPL-SCNC: 113 MMOL/L (ref 98–107)
CO2 SERPL-SCNC: 17 MMOL/L (ref 21–32)
CREAT SERPL-MCNC: 0.89 MG/DL (ref 0.5–1.05)
EGFRCR SERPLBLD CKD-EPI 2021: 70 ML/MIN/1.73M*2
EJECTION FRACTION APICAL 4 CHAMBER: 65.6
ERYTHROCYTE [DISTWIDTH] IN BLOOD BY AUTOMATED COUNT: 14 % (ref 11.5–14.5)
GLUCOSE BLD MANUAL STRIP-MCNC: 135 MG/DL (ref 74–99)
GLUCOSE BLD MANUAL STRIP-MCNC: 162 MG/DL (ref 74–99)
GLUCOSE BLD MANUAL STRIP-MCNC: 163 MG/DL (ref 74–99)
GLUCOSE BLD MANUAL STRIP-MCNC: 196 MG/DL (ref 74–99)
GLUCOSE SERPL-MCNC: 211 MG/DL (ref 74–99)
HCT VFR BLD AUTO: 35.4 % (ref 36–46)
HGB BLD-MCNC: 11.2 G/DL (ref 12–16)
HOLD SPECIMEN: NORMAL
LACTATE SERPL-SCNC: 1.8 MMOL/L (ref 0.4–2)
LEFT VENTRICLE INTERNAL DIMENSION DIASTOLE: 4.15 CM (ref 3.5–6)
LEFT VENTRICULAR OUTFLOW TRACT DIAMETER: 1.9 CM
MCH RBC QN AUTO: 29.8 PG (ref 26–34)
MCHC RBC AUTO-ENTMCNC: 31.6 G/DL (ref 32–36)
MCV RBC AUTO: 94 FL (ref 80–100)
NRBC BLD-RTO: 0 /100 WBCS (ref 0–0)
PLATELET # BLD AUTO: 148 X10*3/UL (ref 150–450)
POTASSIUM SERPL-SCNC: 4 MMOL/L (ref 3.5–5.3)
PROT SERPL-MCNC: 5.6 G/DL (ref 6.4–8.2)
RBC # BLD AUTO: 3.76 X10*6/UL (ref 4–5.2)
SODIUM SERPL-SCNC: 140 MMOL/L (ref 136–145)
WBC # BLD AUTO: 27.2 X10*3/UL (ref 4.4–11.3)

## 2024-02-20 PROCEDURE — 74177 CT ABD & PELVIS W/CONTRAST: CPT

## 2024-02-20 PROCEDURE — 2550000001 HC RX 255 CONTRASTS: Performed by: HOSPITALIST

## 2024-02-20 PROCEDURE — A9698 NON-RAD CONTRAST MATERIALNOC: HCPCS | Performed by: HOSPITALIST

## 2024-02-20 PROCEDURE — 74177 CT ABD & PELVIS W/CONTRAST: CPT | Performed by: RADIOLOGY

## 2024-02-20 PROCEDURE — 82947 ASSAY GLUCOSE BLOOD QUANT: CPT

## 2024-02-20 PROCEDURE — 2500000001 HC RX 250 WO HCPCS SELF ADMINISTERED DRUGS (ALT 637 FOR MEDICARE OP): Performed by: HOSPITALIST

## 2024-02-20 PROCEDURE — 99291 CRITICAL CARE FIRST HOUR: CPT | Performed by: INTERNAL MEDICINE

## 2024-02-20 PROCEDURE — 2500000004 HC RX 250 GENERAL PHARMACY W/ HCPCS (ALT 636 FOR OP/ED): Performed by: HOSPITALIST

## 2024-02-20 PROCEDURE — 2020000001 HC ICU ROOM DAILY

## 2024-02-20 PROCEDURE — 49405 IMAGE CATH FLUID COLXN VISC: CPT

## 2024-02-20 PROCEDURE — 2500000004 HC RX 250 GENERAL PHARMACY W/ HCPCS (ALT 636 FOR OP/ED): Performed by: INTERNAL MEDICINE

## 2024-02-20 PROCEDURE — 36415 COLL VENOUS BLD VENIPUNCTURE: CPT | Performed by: HOSPITALIST

## 2024-02-20 PROCEDURE — 83605 ASSAY OF LACTIC ACID: CPT | Performed by: HOSPITALIST

## 2024-02-20 PROCEDURE — 99233 SBSQ HOSP IP/OBS HIGH 50: CPT | Performed by: SURGERY

## 2024-02-20 PROCEDURE — 47490 INCISION OF GALLBLADDER: CPT | Performed by: RADIOLOGY

## 2024-02-20 PROCEDURE — 85027 COMPLETE CBC AUTOMATED: CPT | Performed by: HOSPITALIST

## 2024-02-20 PROCEDURE — C1729 CATH, DRAINAGE: HCPCS

## 2024-02-20 PROCEDURE — 99233 SBSQ HOSP IP/OBS HIGH 50: CPT | Performed by: HOSPITALIST

## 2024-02-20 PROCEDURE — 2720000007 HC OR 272 NO HCPCS

## 2024-02-20 PROCEDURE — 94761 N-INVAS EAR/PLS OXIMETRY MLT: CPT

## 2024-02-20 PROCEDURE — 49405 IMAGE CATH FLUID COLXN VISC: CPT | Performed by: RADIOLOGY

## 2024-02-20 PROCEDURE — 0F9430Z DRAINAGE OF GALLBLADDER WITH DRAINAGE DEVICE, PERCUTANEOUS APPROACH: ICD-10-PCS | Performed by: RADIOLOGY

## 2024-02-20 PROCEDURE — 80053 COMPREHEN METABOLIC PANEL: CPT | Performed by: HOSPITALIST

## 2024-02-20 PROCEDURE — 2500000005 HC RX 250 GENERAL PHARMACY W/O HCPCS: Performed by: RADIOLOGY

## 2024-02-20 PROCEDURE — 2500000002 HC RX 250 W HCPCS SELF ADMINISTERED DRUGS (ALT 637 FOR MEDICARE OP, ALT 636 FOR OP/ED): Performed by: HOSPITALIST

## 2024-02-20 RX ORDER — LIDOCAINE HYDROCHLORIDE 20 MG/ML
INJECTION, SOLUTION EPIDURAL; INFILTRATION; INTRACAUDAL; PERINEURAL
Status: COMPLETED | OUTPATIENT
Start: 2024-02-20 | End: 2024-02-20

## 2024-02-20 RX ORDER — HYDROMORPHONE HYDROCHLORIDE 2 MG/ML
2 INJECTION, SOLUTION INTRAMUSCULAR; INTRAVENOUS; SUBCUTANEOUS
Status: DISCONTINUED | OUTPATIENT
Start: 2024-02-20 | End: 2024-02-23

## 2024-02-20 RX ORDER — SODIUM CHLORIDE, SODIUM LACTATE, POTASSIUM CHLORIDE, CALCIUM CHLORIDE 600; 310; 30; 20 MG/100ML; MG/100ML; MG/100ML; MG/100ML
75 INJECTION, SOLUTION INTRAVENOUS CONTINUOUS
Status: DISCONTINUED | OUTPATIENT
Start: 2024-02-20 | End: 2024-02-24

## 2024-02-20 RX ORDER — HYDROMORPHONE HYDROCHLORIDE 1 MG/ML
1 INJECTION, SOLUTION INTRAMUSCULAR; INTRAVENOUS; SUBCUTANEOUS
Status: DISCONTINUED | OUTPATIENT
Start: 2024-02-20 | End: 2024-02-20

## 2024-02-20 RX ADMIN — POTASSIUM CHLORIDE 20 MEQ: 1500 TABLET, EXTENDED RELEASE ORAL at 11:42

## 2024-02-20 RX ADMIN — PIPERACILLIN SODIUM AND TAZOBACTAM SODIUM 3.38 G: 3; .375 INJECTION, SOLUTION INTRAVENOUS at 23:34

## 2024-02-20 RX ADMIN — FERROUS SULFATE TAB 325 MG (65 MG ELEMENTAL FE) 1 TABLET: 325 (65 FE) TAB at 11:42

## 2024-02-20 RX ADMIN — SODIUM CHLORIDE, POTASSIUM CHLORIDE, SODIUM LACTATE AND CALCIUM CHLORIDE 125 ML/HR: 600; 310; 30; 20 INJECTION, SOLUTION INTRAVENOUS at 23:35

## 2024-02-20 RX ADMIN — HYDROMORPHONE HYDROCHLORIDE 2 MG: 2 INJECTION, SOLUTION INTRAMUSCULAR; INTRAVENOUS; SUBCUTANEOUS at 22:05

## 2024-02-20 RX ADMIN — ASPIRIN 81 MG: 81 TABLET, COATED ORAL at 20:44

## 2024-02-20 RX ADMIN — SODIUM CHLORIDE, POTASSIUM CHLORIDE, SODIUM LACTATE AND CALCIUM CHLORIDE 125 ML/HR: 600; 310; 30; 20 INJECTION, SOLUTION INTRAVENOUS at 16:08

## 2024-02-20 RX ADMIN — NYSTATIN 1 APPLICATION: 100000 POWDER TOPICAL at 13:13

## 2024-02-20 RX ADMIN — SODIUM CHLORIDE 125 ML/HR: 9 INJECTION, SOLUTION INTRAVENOUS at 11:46

## 2024-02-20 RX ADMIN — PIPERACILLIN SODIUM AND TAZOBACTAM SODIUM 3.38 G: 3; .375 INJECTION, SOLUTION INTRAVENOUS at 05:01

## 2024-02-20 RX ADMIN — GABAPENTIN 300 MG: 300 CAPSULE ORAL at 11:42

## 2024-02-20 RX ADMIN — POLYVINYL ALCOHOL, POVIDONE 1 DROP: 14; 6 SOLUTION/ DROPS OPHTHALMIC at 16:08

## 2024-02-20 RX ADMIN — NYSTATIN 1 APPLICATION: 100000 POWDER TOPICAL at 20:46

## 2024-02-20 RX ADMIN — FAMOTIDINE 20 MG: 20 TABLET, FILM COATED ORAL at 11:41

## 2024-02-20 RX ADMIN — POLYVINYL ALCOHOL, POVIDONE 1 DROP: 14; 6 SOLUTION/ DROPS OPHTHALMIC at 11:42

## 2024-02-20 RX ADMIN — GABAPENTIN 300 MG: 300 CAPSULE ORAL at 20:44

## 2024-02-20 RX ADMIN — SENNOSIDES 8.6 MG: 8.6 TABLET, FILM COATED ORAL at 20:44

## 2024-02-20 RX ADMIN — HYDROMORPHONE HYDROCHLORIDE 1 MG: 1 INJECTION, SOLUTION INTRAMUSCULAR; INTRAVENOUS; SUBCUTANEOUS at 13:09

## 2024-02-20 RX ADMIN — PIPERACILLIN SODIUM AND TAZOBACTAM SODIUM 3.38 G: 3; .375 INJECTION, SOLUTION INTRAVENOUS at 17:59

## 2024-02-20 RX ADMIN — PIPERACILLIN SODIUM AND TAZOBACTAM SODIUM 3.38 G: 3; .375 INJECTION, SOLUTION INTRAVENOUS at 11:43

## 2024-02-20 RX ADMIN — GABAPENTIN 300 MG: 300 CAPSULE ORAL at 16:07

## 2024-02-20 RX ADMIN — DULOXETINE HYDROCHLORIDE 60 MG: 60 CAPSULE, DELAYED RELEASE ORAL at 11:43

## 2024-02-20 RX ADMIN — IOHEXOL 500 ML: 12 SOLUTION ORAL at 17:59

## 2024-02-20 RX ADMIN — POLYVINYL ALCOHOL, POVIDONE 1 DROP: 14; 6 SOLUTION/ DROPS OPHTHALMIC at 20:43

## 2024-02-20 RX ADMIN — HYDROMORPHONE HYDROCHLORIDE 2 MG: 2 INJECTION, SOLUTION INTRAMUSCULAR; INTRAVENOUS; SUBCUTANEOUS at 18:49

## 2024-02-20 RX ADMIN — HYDROMORPHONE HYDROCHLORIDE 1 MG: 1 INJECTION, SOLUTION INTRAMUSCULAR; INTRAVENOUS; SUBCUTANEOUS at 10:05

## 2024-02-20 RX ADMIN — SODIUM CHLORIDE 125 ML/HR: 9 INJECTION, SOLUTION INTRAVENOUS at 04:17

## 2024-02-20 RX ADMIN — FERROUS SULFATE TAB 325 MG (65 MG ELEMENTAL FE) 1 TABLET: 325 (65 FE) TAB at 20:44

## 2024-02-20 RX ADMIN — HYDROMORPHONE HYDROCHLORIDE 0.5 MG: 1 INJECTION, SOLUTION INTRAMUSCULAR; INTRAVENOUS; SUBCUTANEOUS at 08:26

## 2024-02-20 RX ADMIN — LIDOCAINE HYDROCHLORIDE 10 ML: 20 INJECTION, SOLUTION EPIDURAL; INFILTRATION; INTRACAUDAL; PERINEURAL at 09:11

## 2024-02-20 RX ADMIN — HYDROMORPHONE HYDROCHLORIDE 1 MG: 1 INJECTION, SOLUTION INTRAMUSCULAR; INTRAVENOUS; SUBCUTANEOUS at 15:58

## 2024-02-20 RX ADMIN — ATORVASTATIN CALCIUM 40 MG: 40 TABLET, FILM COATED ORAL at 11:42

## 2024-02-20 RX ADMIN — IOHEXOL 75 ML: 350 INJECTION, SOLUTION INTRAVENOUS at 18:34

## 2024-02-20 ASSESSMENT — COGNITIVE AND FUNCTIONAL STATUS - GENERAL
TURNING FROM BACK TO SIDE WHILE IN FLAT BAD: A LITTLE
WALKING IN HOSPITAL ROOM: TOTAL
HELP NEEDED FOR BATHING: A LOT
PERSONAL GROOMING: A LOT
DRESSING REGULAR LOWER BODY CLOTHING: A LOT
MOBILITY SCORE: 11
MOVING FROM LYING ON BACK TO SITTING ON SIDE OF FLAT BED WITH BEDRAILS: A LITTLE
MOVING FROM LYING ON BACK TO SITTING ON SIDE OF FLAT BED WITH BEDRAILS: A LOT
STANDING UP FROM CHAIR USING ARMS: TOTAL
EATING MEALS: A LOT
WALKING IN HOSPITAL ROOM: TOTAL
TOILETING: A LOT
MOVING TO AND FROM BED TO CHAIR: A LOT
STANDING UP FROM CHAIR USING ARMS: TOTAL
MOBILITY SCORE: 10
CLIMB 3 TO 5 STEPS WITH RAILING: TOTAL
CLIMB 3 TO 5 STEPS WITH RAILING: TOTAL
TURNING FROM BACK TO SIDE WHILE IN FLAT BAD: A LOT
DAILY ACTIVITIY SCORE: 12
MOVING TO AND FROM BED TO CHAIR: A LITTLE
DRESSING REGULAR UPPER BODY CLOTHING: A LOT

## 2024-02-20 ASSESSMENT — PAIN SCALES - GENERAL
PAINLEVEL_OUTOF10: 0 - NO PAIN
PAINLEVEL_OUTOF10: 8
PAINLEVEL_OUTOF10: 9
PAINLEVEL_OUTOF10: 8
PAINLEVEL_OUTOF10: 6
PAINLEVEL_OUTOF10: 8
PAINLEVEL_OUTOF10: 8
PAINLEVEL_OUTOF10: 0 - NO PAIN
PAINLEVEL_OUTOF10: 3
PAINLEVEL_OUTOF10: 9
PAINLEVEL_OUTOF10: 0 - NO PAIN
PAINLEVEL_OUTOF10: 7

## 2024-02-20 ASSESSMENT — PAIN DESCRIPTION - DESCRIPTORS
DESCRIPTORS: SHARP
DESCRIPTORS: SHARP;ACHING
DESCRIPTORS: DULL;STABBING
DESCRIPTORS: ACHING;PRESSURE

## 2024-02-20 ASSESSMENT — PAIN DESCRIPTION - ORIENTATION
ORIENTATION: LEFT
ORIENTATION: RIGHT

## 2024-02-20 ASSESSMENT — PAIN - FUNCTIONAL ASSESSMENT
PAIN_FUNCTIONAL_ASSESSMENT: 0-10

## 2024-02-20 ASSESSMENT — PAIN DESCRIPTION - LOCATION
LOCATION: ABDOMEN
LOCATION: ABDOMEN

## 2024-02-20 NOTE — PROGRESS NOTES
02/20/24 1413   Discharge Planning   Living Arrangements Other (Comment)  ([facility] X's 9-10 mos)   Support Systems Spouse/significant other;Children;Family members;/;Other (Comment)   Assistance Needed 1-2 assist, wheelchair bound   Type of Residence Nursing home/residential care   Do you have animals or pets at home? No   Care Facility Name [facility]   Who is requesting discharge planning? Provider   Home or Post Acute Services Post acute facilities (Rehab/SNF/etc)   Type of Post Acute Facility Services Long term care;Skilled nursing   Patient expects to be discharged to: Ret to [facility]   Does the patient need discharge transport arranged? Yes   RoundTrip coordination needed? Yes   Has discharge transport been arranged? No   What day is the transport expected? 02/22/24   Patient Choice   Provider Choice list and CMS website (https://medicare.gov/care-compare#search) for post-acute Quality and Resource Measure Data were provided and reviewed with: Family;Patient   Patient / Family choosing to utilize agency / facility established prior to hospitalization Yes     SW met with pt and spouse/B[place] to complete the assessment. Pt is alert and oriented, pleasant and cooperative and both parties were educated to the SW roles in the discharge planning process. Pt provided permission for spouse to speak on her behalf due to pain level.  Prior to hospitalization, pt had been residing at [facility] for 9-10 mos. Reports she has been receiving therapy, but also has Medicaid.  She is wheelchair bound at baseline and requires assistance with all care/activities. Pt is planning to return to [facility] at discharge and has no concerns regarding her discharge plan. A return referral was attached in [facility]; awaiting reply. Care Transitions will continue to follow.

## 2024-02-20 NOTE — CONSULTS
Reason For Consult  Critical illness    History Of Present Illness  Hannah Pedraza is a 68 y.o. female presenting with abdominal pain.   She presented to the emergency room secondary to abdominal pain nausea and fever.  She was also quite confused  Her workup showed possible acute cholecystitis and possible urinary tract infection.  She underwent a cholecystostomy tube this morning  She has bloody drainage in the bag at this time  She still is having pain especially in the right side and radiated up to her right shoulder  She is being weaned off of vasopressors at this time  She is drinking some clear fluids but does not have much of an appetite    Past Medical History  She has a past medical history of Acute embolism and thrombosis of deep vein of left lower extremity (CMS/HCC), Acute kidney failure (CMS/HCC), Anxiety, Arthritis, Benign neoplasm of unspecified adrenal gland, Calculus of gallbladder, Depression, Diabetes mellitus (CMS/HCC), Dry eye syndrome of right lacrimal gland, Heart failure, unspecified (CMS/HCC), Hyperlipidemia, unspecified, Hypertension, Insomnia, Mild intermittent asthma, Overactive bladder, TIA (transient ischemic attack), and Vitamin D deficiency.    Surgical History  She has no past surgical history on file.     Social History  She reports that she has never smoked. She has never been exposed to tobacco smoke. She has never used smokeless tobacco. She reports that she does not drink alcohol and does not use drugs.    Family History  No family history on file.     Allergies  Morphine    Review of Systems  A full 10 point review of systems was obtained is negative except HPI as above     Physical Exam  Physical Exam  Constitutional:       Appearance: She is obese. She is ill-appearing.   HENT:      Head: Normocephalic and atraumatic.      Right Ear: External ear normal.      Left Ear: External ear normal.      Nose: Nose normal.      Mouth/Throat:      Mouth: Mucous membranes are moist.       Pharynx: Oropharynx is clear.   Eyes:      Extraocular Movements: Extraocular movements intact.      Conjunctiva/sclera: Conjunctivae normal.      Pupils: Pupils are equal, round, and reactive to light.   Cardiovascular:      Rate and Rhythm: Normal rate and regular rhythm.   Pulmonary:      Effort: Pulmonary effort is normal.      Breath sounds: Normal breath sounds.   Abdominal:      General: Abdomen is flat.      Palpations: Abdomen is soft.      Comments: Drain in place in right upper quadrant  Bloody drainage   Skin:     General: Skin is warm and dry.   Neurological:      General: No focal deficit present.      Mental Status: She is alert and oriented to person, place, and time.   Psychiatric:         Mood and Affect: Mood normal.         Behavior: Behavior normal.                 I&O 24HR    Intake/Output Summary (Last 24 hours) at 2/20/2024 1516  Last data filed at 2/20/2024 1310  Gross per 24 hour   Intake 2597.42 ml   Output 2462 ml   Net 135.42 ml       Vitals 24HR  Heart Rate:  [78-99]   Temp:  [36 °C (96.8 °F)-37.7 °C (99.8 °F)]   Resp:  [15-26]   BP: ()/()   SpO2:  [90 %-100 %]     Scheduled medications  aspirin, 81 mg, oral, Nightly  atorvastatin, 40 mg, oral, Daily  DULoxetine, 60 mg, oral, Daily  famotidine, 20 mg, oral, Daily   Or  famotidine, 20 mg, intravenous, Daily  ferrous sulfate (325 mg ferrous sulfate), 65 mg of iron, oral, BID  gabapentin, 300 mg, oral, TID  insulin lispro, 0-5 Units, subcutaneous, TID with meals  lubricating eye drops, 1 drop, Both Eyes, TID  nystatin, 1 Application, Topical, BID  piperacillin-tazobactam, 3.375 g, intravenous, q6h  polyethylene glycol, 17 g, oral, Daily  potassium chloride CR, 20 mEq, oral, Daily  sennosides, 1 tablet, oral, BID      Continuous medications  heparin, 0-4,500 Units/hr, Last Rate: 0 Units/hr (02/19/24 9917)  norepinephrine, 0.01-1 mcg/kg/min, Last Rate: Stopped (02/20/24 7614)  sodium chloride 0.9%, 125 mL/hr, Last Rate: 125  mL/hr (02/20/24 1146)      PRN medications  PRN medications: acetaminophen **OR** acetaminophen **OR** acetaminophen, acetaminophen **OR** acetaminophen **OR** acetaminophen, bisacodyl, dextrose 10 % in water (D10W), dextrose, glucagon, heparin, HYDROmorphone, HYDROmorphone, ondansetron ODT **OR** ondansetron, oxygen, oxymetazoline      Relevant Results  Results reviewed     Assessment/Plan       Septic shock: Now off of vasopressors  Acute cholecystitis with drain in place  Urinary tract infection  History of DVT chronically on Eliquis  Lactic acidosis  Normal anion gap metabolic acidosis with hyperchloremia  Obesity  Acid reflux    Plan:  At this time she is on Zosyn we will continue to follow cultures and watch this empirically for now  I will change IV fluids to lactated Ringer's she is becoming acidotic with hyperchloremia  Currently has a drain in place  She is off of vasopressors I will discontinue the order  She has blood coming from the drain at this time  For now I would hold the heparin drip  She does have a history of DVT  I will discontinue potassium replacement chronically and we will check on a daily basis  We will continue to watch very closely  Thanks for the consult  I spent 30 minutes of critical care time directly involved in patient care excluding any billable procedures    Principal Problem:    Septic shock (CMS/[facility])        Zack Light,

## 2024-02-20 NOTE — CARE PLAN
Pt off levophed this morning.  Placed a drain in gall bladder this am.  Pt has struggled with pain which has been increasingly worse.  Bowel sounds have been decreasing to absent.  Dr. Guevara notified and ct ordered.   been in and out today.

## 2024-02-20 NOTE — CARE PLAN
Problem: Skin  Goal: Participates in plan/prevention/treatment measures  Outcome: Progressing  Flowsheets (Taken 2/19/2024 1947)  Participates in plan/prevention/treatment measures: Elevate heels  Goal: Prevent/manage excess moisture  Outcome: Progressing  Flowsheets (Taken 2/19/2024 1947)  Prevent/manage excess moisture:   Monitor for/manage infection if present   Cleanse incontinence/protect with barrier cream  Goal: Prevent/minimize sheer/friction injuries  Outcome: Progressing  Flowsheets (Taken 2/19/2024 1947)  Prevent/minimize sheer/friction injuries:   HOB 30 degrees or less   Turn/reposition every 2 hours/use positioning/transfer devices  Goal: Promote/optimize nutrition  Outcome: Progressing  Flowsheets (Taken 2/19/2024 1947)  Promote/optimize nutrition: Monitor/record intake including meals  Goal: Promote skin healing  Outcome: Progressing  Flowsheets (Taken 2/19/2024 1947)  Promote skin healing: Turn/reposition every 2 hours/use positioning/transfer devices     Problem: Infection prevention/bleeding  Goal: Infection s/sx managed  Outcome: Progressing  Goal: No further progression of infection  Outcome: Progressing     Problem: Perfusion/Cardiac  Goal: Adequate perfusion to organs/extremities  Outcome: Progressing  Goal: Hemodynamically stable  Outcome: Progressing  Goal: No cardiac arrhythmias  Outcome: Progressing     Problem: Respiratory/Oxygenation  Goal: No signs of respiratory compromise  Outcome: Progressing  Goal: Tolerates activity without increased O2 demands  Outcome: Progressing     Problem: Neuro/Coping  Goal: Minimal anxiety; utilize coping mechanisms  Outcome: Progressing  Goal: No signs of neurological compromise  Outcome: Progressing  Goal: Increase self care/family involvement  Outcome: Progressing     Problem: Fluid/Electrolyte/Nutrition  Goal: Fluid balance within 1 liter of normovolemia  Outcome: Progressing  Goal: No signs of renal failure  Outcome: Progressing  Goal: Normal  electrolyte levels  Outcome: Progressing  Goal: Normal glucose levels  Outcome: Progressing  Goal: Tolerates nutritional intake  Outcome: Progressing   The patient's goals for the shift include      The clinical goals for the shift include improvement in hemodynamics, improve orientation, rest/sleep

## 2024-02-20 NOTE — PROGRESS NOTES
"General Surgery Progress Note    Patient is a 68 y.o. female with SIRS, possible cholecystitis versus urinary source.  Not complaining of any abdominal pain, but still mildly tender in right upper quadrant.  Nontender in the left abdomen.  Significant leukocytosis this morning of 27.  Levophed requirement has decreased, currently at 0.1.  Tachycardia resolved with fluid resuscitation and adequate urine output overnight.  Afebrile.  Heparin drip was held since around 10 PM.  She has been NPO.    BP 85/59   Pulse 86   Temp 37.7 °C (99.8 °F) (Oral)   Resp 18   Ht 1.499 m (4' 11\")   Wt 95.2 kg (209 lb 14.1 oz)   SpO2 96%   BMI 42.39 kg/m²   NAD, laying supine in bed   No labored breathing, on supplemental oxygen via nasal cannula  NSR, rate in the high 80s/low 90s  Abdomen soft, nondistended, obese with BMI 42, tender to palpation in the right upper quadrant, nontender elsewhere    Intake/Output Summary (Last 24 hours) at 2/20/2024 0645  Last data filed at 2/20/2024 0531  Gross per 24 hour   Intake 3965.61 ml   Output 2450 ml   Net 1515.61 ml     WBC 27.2, Hgb 11.2, Plts 148  INR 1.7    Patient is a 68 y.o. female with SIRS, possible cholecystitis versus urinary source.  Continue IV antibiotics and keep NPO.  Continue to hold heparin drip for cholecystostomy tube this morning.  Rest of care per primary team.    Kindra Balderas MD  02/20/24  6:47 AM            "

## 2024-02-20 NOTE — PROGRESS NOTES
General Surgery Afternoon Rounds    Patient had cholecystostomy tube placed this morning. There was blood clot within the bag and tubing. I flushed this. Recommend flushing once more this evening. She is now off of levophed. She had some clear liquids this afternoon, but does not have much of an appetite and has had a lot of burping. She is at risk for ileus. Advised her not to eat/drink if no appetite or any nausea.    Kindra haley MD

## 2024-02-21 ENCOUNTER — APPOINTMENT (OUTPATIENT)
Dept: RADIOLOGY | Facility: HOSPITAL | Age: 69
DRG: 853 | End: 2024-02-21
Payer: COMMERCIAL

## 2024-02-21 LAB
ALBUMIN SERPL BCP-MCNC: 2.9 G/DL (ref 3.4–5)
ALP SERPL-CCNC: 87 U/L (ref 33–136)
ALT SERPL W P-5'-P-CCNC: 27 U/L (ref 7–45)
ANION GAP SERPL CALC-SCNC: 11 MMOL/L (ref 10–20)
AST SERPL W P-5'-P-CCNC: 31 U/L (ref 9–39)
BACTERIA UR CULT: ABNORMAL
BACTERIA UR CULT: ABNORMAL
BILIRUB SERPL-MCNC: 1.1 MG/DL (ref 0–1.2)
BUN SERPL-MCNC: 27 MG/DL (ref 6–23)
CALCIUM SERPL-MCNC: 8.1 MG/DL (ref 8.6–10.3)
CHLORIDE SERPL-SCNC: 111 MMOL/L (ref 98–107)
CO2 SERPL-SCNC: 20 MMOL/L (ref 21–32)
CREAT SERPL-MCNC: 0.64 MG/DL (ref 0.5–1.05)
EGFRCR SERPLBLD CKD-EPI 2021: >90 ML/MIN/1.73M*2
ERYTHROCYTE [DISTWIDTH] IN BLOOD BY AUTOMATED COUNT: 14 % (ref 11.5–14.5)
GLUCOSE BLD MANUAL STRIP-MCNC: 104 MG/DL (ref 74–99)
GLUCOSE BLD MANUAL STRIP-MCNC: 125 MG/DL (ref 74–99)
GLUCOSE BLD MANUAL STRIP-MCNC: 134 MG/DL (ref 74–99)
GLUCOSE BLD MANUAL STRIP-MCNC: 135 MG/DL (ref 74–99)
GLUCOSE SERPL-MCNC: 135 MG/DL (ref 74–99)
HCT VFR BLD AUTO: 35.5 % (ref 36–46)
HGB BLD-MCNC: 11.2 G/DL (ref 12–16)
LACTATE SERPL-SCNC: 1.1 MMOL/L (ref 0.4–2)
MCH RBC QN AUTO: 30.5 PG (ref 26–34)
MCHC RBC AUTO-ENTMCNC: 31.5 G/DL (ref 32–36)
MCV RBC AUTO: 97 FL (ref 80–100)
NRBC BLD-RTO: 0 /100 WBCS (ref 0–0)
PLATELET # BLD AUTO: 123 X10*3/UL (ref 150–450)
POTASSIUM SERPL-SCNC: 4.1 MMOL/L (ref 3.5–5.3)
PROT SERPL-MCNC: 5.7 G/DL (ref 6.4–8.2)
RBC # BLD AUTO: 3.67 X10*6/UL (ref 4–5.2)
SODIUM SERPL-SCNC: 138 MMOL/L (ref 136–145)
WBC # BLD AUTO: 20.1 X10*3/UL (ref 4.4–11.3)

## 2024-02-21 PROCEDURE — 99233 SBSQ HOSP IP/OBS HIGH 50: CPT | Performed by: SURGERY

## 2024-02-21 PROCEDURE — 83605 ASSAY OF LACTIC ACID: CPT | Performed by: INTERNAL MEDICINE

## 2024-02-21 PROCEDURE — 2500000001 HC RX 250 WO HCPCS SELF ADMINISTERED DRUGS (ALT 637 FOR MEDICARE OP): Performed by: HOSPITALIST

## 2024-02-21 PROCEDURE — 82947 ASSAY GLUCOSE BLOOD QUANT: CPT

## 2024-02-21 PROCEDURE — 94762 N-INVAS EAR/PLS OXIMTRY CONT: CPT

## 2024-02-21 PROCEDURE — 78830 RP LOCLZJ TUM SPECT W/CT 1: CPT | Performed by: STUDENT IN AN ORGANIZED HEALTH CARE EDUCATION/TRAINING PROGRAM

## 2024-02-21 PROCEDURE — 78227 HEPATOBIL SYST IMAGE W/DRUG: CPT

## 2024-02-21 PROCEDURE — 2500000004 HC RX 250 GENERAL PHARMACY W/ HCPCS (ALT 636 FOR OP/ED): Performed by: INTERNAL MEDICINE

## 2024-02-21 PROCEDURE — 99233 SBSQ HOSP IP/OBS HIGH 50: CPT | Performed by: INTERNAL MEDICINE

## 2024-02-21 PROCEDURE — 2500000004 HC RX 250 GENERAL PHARMACY W/ HCPCS (ALT 636 FOR OP/ED): Performed by: HOSPITALIST

## 2024-02-21 PROCEDURE — 78226 HEPATOBILIARY SYSTEM IMAGING: CPT | Performed by: STUDENT IN AN ORGANIZED HEALTH CARE EDUCATION/TRAINING PROGRAM

## 2024-02-21 PROCEDURE — 85027 COMPLETE CBC AUTOMATED: CPT | Performed by: INTERNAL MEDICINE

## 2024-02-21 PROCEDURE — 99231 SBSQ HOSP IP/OBS SF/LOW 25: CPT | Performed by: HOSPITALIST

## 2024-02-21 PROCEDURE — 2020000001 HC ICU ROOM DAILY

## 2024-02-21 PROCEDURE — 3430000001 HC RX 343 DIAGNOSTIC RADIOPHARMACEUTICALS: Performed by: HOSPITALIST

## 2024-02-21 PROCEDURE — 36415 COLL VENOUS BLD VENIPUNCTURE: CPT | Performed by: INTERNAL MEDICINE

## 2024-02-21 PROCEDURE — 78830 RP LOCLZJ TUM SPECT W/CT 1: CPT

## 2024-02-21 PROCEDURE — 84075 ASSAY ALKALINE PHOSPHATASE: CPT | Performed by: INTERNAL MEDICINE

## 2024-02-21 PROCEDURE — A9537 TC99M MEBROFENIN: HCPCS | Performed by: HOSPITALIST

## 2024-02-21 RX ORDER — KIT FOR THE PREPARATION OF TECHNETIUM TC 99M MEBROFENIN 45 MG/10ML
5.7 INJECTION, POWDER, LYOPHILIZED, FOR SOLUTION INTRAVENOUS
Status: COMPLETED | OUTPATIENT
Start: 2024-02-21 | End: 2024-02-21

## 2024-02-21 RX ADMIN — HYDROMORPHONE HYDROCHLORIDE 2 MG: 2 INJECTION, SOLUTION INTRAMUSCULAR; INTRAVENOUS; SUBCUTANEOUS at 06:15

## 2024-02-21 RX ADMIN — NYSTATIN 1 APPLICATION: 100000 POWDER TOPICAL at 21:02

## 2024-02-21 RX ADMIN — PIPERACILLIN SODIUM AND TAZOBACTAM SODIUM 3.38 G: 3; .375 INJECTION, SOLUTION INTRAVENOUS at 21:02

## 2024-02-21 RX ADMIN — SODIUM CHLORIDE, POTASSIUM CHLORIDE, SODIUM LACTATE AND CALCIUM CHLORIDE 125 ML/HR: 600; 310; 30; 20 INJECTION, SOLUTION INTRAVENOUS at 09:30

## 2024-02-21 RX ADMIN — SODIUM CHLORIDE, POTASSIUM CHLORIDE, SODIUM LACTATE AND CALCIUM CHLORIDE 125 ML/HR: 600; 310; 30; 20 INJECTION, SOLUTION INTRAVENOUS at 18:29

## 2024-02-21 RX ADMIN — KIT FOR THE PREPARATION OF TECHNETIUM TC 99M MEBROFENIN 5.7 MILLICURIE: 45 INJECTION, POWDER, LYOPHILIZED, FOR SOLUTION INTRAVENOUS at 11:20

## 2024-02-21 RX ADMIN — PIPERACILLIN SODIUM AND TAZOBACTAM SODIUM 3.38 G: 3; .375 INJECTION, SOLUTION INTRAVENOUS at 13:34

## 2024-02-21 RX ADMIN — POLYVINYL ALCOHOL, POVIDONE 1 DROP: 14; 6 SOLUTION/ DROPS OPHTHALMIC at 09:30

## 2024-02-21 RX ADMIN — FAMOTIDINE 20 MG: 10 INJECTION, SOLUTION INTRAVENOUS at 09:30

## 2024-02-21 RX ADMIN — HYDROMORPHONE HYDROCHLORIDE 2 MG: 2 INJECTION, SOLUTION INTRAMUSCULAR; INTRAVENOUS; SUBCUTANEOUS at 02:46

## 2024-02-21 RX ADMIN — HYDROMORPHONE HYDROCHLORIDE 2 MG: 2 INJECTION, SOLUTION INTRAMUSCULAR; INTRAVENOUS; SUBCUTANEOUS at 16:40

## 2024-02-21 RX ADMIN — PIPERACILLIN SODIUM AND TAZOBACTAM SODIUM 3.38 G: 3; .375 INJECTION, SOLUTION INTRAVENOUS at 05:09

## 2024-02-21 RX ADMIN — HYDROMORPHONE HYDROCHLORIDE 0.5 MG: 1 INJECTION, SOLUTION INTRAMUSCULAR; INTRAVENOUS; SUBCUTANEOUS at 13:36

## 2024-02-21 RX ADMIN — NYSTATIN 1 APPLICATION: 100000 POWDER TOPICAL at 09:59

## 2024-02-21 ASSESSMENT — PAIN - FUNCTIONAL ASSESSMENT
PAIN_FUNCTIONAL_ASSESSMENT: CPOT (CRITICAL CARE PAIN OBSERVATION TOOL)
PAIN_FUNCTIONAL_ASSESSMENT: WONG-BAKER FACES
PAIN_FUNCTIONAL_ASSESSMENT: 0-10
PAIN_FUNCTIONAL_ASSESSMENT: PAINAD (PAIN ASSESSMENT IN ADVANCED DEMENTIA SCALE)
PAIN_FUNCTIONAL_ASSESSMENT: 0-10
PAIN_FUNCTIONAL_ASSESSMENT: CPOT (CRITICAL CARE PAIN OBSERVATION TOOL)

## 2024-02-21 ASSESSMENT — COGNITIVE AND FUNCTIONAL STATUS - GENERAL
EATING MEALS: TOTAL
PERSONAL GROOMING: TOTAL
MOVING TO AND FROM BED TO CHAIR: TOTAL
DRESSING REGULAR UPPER BODY CLOTHING: TOTAL
TURNING FROM BACK TO SIDE WHILE IN FLAT BAD: TOTAL
DAILY ACTIVITIY SCORE: 6
WALKING IN HOSPITAL ROOM: TOTAL
CLIMB 3 TO 5 STEPS WITH RAILING: TOTAL
TOILETING: TOTAL
MOBILITY SCORE: 7
DRESSING REGULAR LOWER BODY CLOTHING: TOTAL
MOVING FROM LYING ON BACK TO SITTING ON SIDE OF FLAT BED WITH BEDRAILS: A LOT
STANDING UP FROM CHAIR USING ARMS: TOTAL
HELP NEEDED FOR BATHING: TOTAL

## 2024-02-21 ASSESSMENT — PAIN SCALES - WONG BAKER
WONGBAKER_NUMERICALRESPONSE: HURTS WHOLE LOT
WONGBAKER_NUMERICALRESPONSE: HURTS LITTLE BIT

## 2024-02-21 ASSESSMENT — PAIN SCALES - PAIN ASSESSMENT IN ADVANCED DEMENTIA (PAINAD)
BODYLANGUAGE: RELAXED
FACIALEXPRESSION: SMILING OR INEXPRESSIVE
BREATHING: NORMAL
TOTALSCORE: 0
CONSOLABILITY: NO NEED TO CONSOLE

## 2024-02-21 ASSESSMENT — PAIN SCALES - GENERAL
PAINLEVEL_OUTOF10: 0 - NO PAIN
PAINLEVEL_OUTOF10: 8
PAINLEVEL_OUTOF10: 0 - NO PAIN
PAINLEVEL_OUTOF10: 8
PAINLEVEL_OUTOF10: 6

## 2024-02-21 ASSESSMENT — PAIN DESCRIPTION - LOCATION
LOCATION: ABDOMEN
LOCATION: ABDOMEN

## 2024-02-21 ASSESSMENT — PAIN DESCRIPTION - DESCRIPTORS: DESCRIPTORS: SHARP

## 2024-02-21 NOTE — CARE PLAN
The patient's goals for the shift include      The clinical goals for the shift include pain control  Pain controled. Pt localizes to speech. Drain removed

## 2024-02-21 NOTE — PROGRESS NOTES
"Hannah Pedraza is a 68 y.o. female on day 1 of admission presenting with Septic shock (CMS/HCC).    Subjective   AMS  Abdomen pain  Nausea  No vomiting or diarrhea   No bleeding as such  No fever          Objective     Physical Exam  General Appearance: AAO x 1, not in acute distress  Skin: skin color pink, warm, and dry; no suspicious rashes or lesions  Eyes : PERRL, EOM's intact  ENT: mucous membranes pink and moist  Neck: normocephalic  Respiratory: Slightly diminished  Heart: regular rate and rhythm.  Abdomen: Nondistended, positive bowel sounds x4, soft,  tender, cholecystostomy drainage tube  Extremities: no edema   Peripheral pulses: normal x4 extremities  Neuro: Confused, no focal motor deficits  Last Recorded Vitals  Blood pressure 102/72, pulse 103, temperature 36.2 °C (97.2 °F), resp. rate 17, height 1.499 m (4' 11\"), weight 95.2 kg (209 lb 14.1 oz), SpO2 92 %.  Intake/Output last 3 Shifts:  I/O last 3 completed shifts:  In: 4401.3 (46.2 mL/kg) [P.O.:100; I.V.:3101.3 (32.6 mL/kg); IV Piggyback:1200]  Out: 2982 (31.3 mL/kg) [Urine:2950 (0.9 mL/kg/hr); Drains:32]  Weight: 95.2 kg     Relevant Results  Scheduled medications  aspirin, 81 mg, oral, Nightly  atorvastatin, 40 mg, oral, Daily  DULoxetine, 60 mg, oral, Daily  famotidine, 20 mg, oral, Daily   Or  famotidine, 20 mg, intravenous, Daily  ferrous sulfate (325 mg ferrous sulfate), 65 mg of iron, oral, BID  gabapentin, 300 mg, oral, TID  insulin lispro, 0-5 Units, subcutaneous, TID with meals  lubricating eye drops, 1 drop, Both Eyes, TID  nystatin, 1 Application, Topical, BID  piperacillin-tazobactam, 3.375 g, intravenous, q6h  polyethylene glycol, 17 g, oral, Daily  sennosides, 1 tablet, oral, BID      Continuous medications  lactated Ringer's, 125 mL/hr, Last Rate: 125 mL/hr (02/20/24 1928)      PRN medications  PRN medications: acetaminophen **OR** acetaminophen **OR** acetaminophen, acetaminophen **OR** acetaminophen **OR** acetaminophen, " bisacodyl, dextrose 10 % in water (D10W), dextrose, glucagon, HYDROmorphone, HYDROmorphone, ondansetron ODT **OR** ondansetron, oxygen, oxymetazoline      US guided abscess fluid collection drainage    Result Date: 2/20/2024  Interpreted By:  Jefe Barrera, STUDY: US GUIDED ABSCESS FLUID COLLECTION DRAINAGE;  2/20/2024 10:42 am   INDICATION: Signs/Symptoms:Fluid collection.   COMPARISON: None.   ACCESSION NUMBER(S): [id number]   ORDERING CLINICIAN: LYNN LÓPEZ   TECHNIQUE: CT guided percutaneous cholecystostomy tube placement   FINDINGS: The procedure and the benefits, risks and potential complications in addition to alternatives of the procedure were discussed with the patient by Dr. Barrera and signed informed consent was obtained.   Patient's vital signs were monitored by the radiology nurse.   Limited ultrasound images were obtained through the right upper quadrant. The images demonstrated  the gallbladder to be dilated, measuring at up to 5.1 cm in diameter. Mild diffuse gallbladder wall thickening is present.. The patient prepped in the usual sterile manner. 1% lidocaine was used for local anesthesia at the planned skin insertion site.   Under direct ultrasound guidance, an 8.5 Mexican needle/catheter was placed into the gallbladder via a percutaneous subcostal transhepatic approach. After confirmation of position of the needle within the gallbladder, the inner needle/stylette was withdrawn. After confirmation of position of the catheter within the collection, the guidewire was removed, the pigtail fixed and the catheter secured to the skin. The catheter was then set to gravity drainage.   Postprocedure images demonstrated no evidence of hemorrhage.   Patient tolerated the procedure without immediate complication. Fluid was sent to the laboratory and cytology for further analysis.       Successful percutaneous cholecystotomy tube placement, as above.   Signed by: Jefe Barrera 2/20/2024 11:24 AM Dictation  workstation:   [id number]    Transthoracic Echo (TTE) Complete    Result Date: 2/20/2024             [place] [address] Phone [phone], Fax [phone] TRANSTHORACIC ECHOCARDIOGRAM REPORT  Patient Name:      KATIE GOODWIN     Reading Physician:    [id number] Elan Ramirez MD Study Date:        2/19/2024             Ordering Provider:    [id number] LYNN LÓPEZ MRN/PID:           [id number]              Fellow: Accession#:        [id number]          Nurse:                Emily Hinds RN Date of Birth/Age: 1955 / 68 years  Sonographer:          Jeremiah Brennan RDCS Gender:            F                     Additional Staff: Height:            152.40 cm             Admit Date:           2/18/2024 Weight:            94.80 kg              Admission Status:     Inpatient -                                                                Routine BSA / BMI:         1.90 m2 / 40.82 kg/m2 Department Location:  [address]-ICU Blood Pressure: 131 /79 mmHg Study Type:    TRANSTHORACIC ECHO (TTE) COMPLETE Diagnosis/ICD: 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 Definity used as a contrast agent for endocardial               border definition. Total contrast used for this procedure was               2.00cc mL via IV push.  PHYSICIAN INTERPRETATION: Left Ventricle: Left ventricular systolic function is normal, with an estimated ejection fraction of 60%. There are no regional wall motion abnormalities. The left ventricular cavity size is normal. Left ventricular diastolic filling was indeterminate. Left Atrium: The left atrium  is normal in size. Right Ventricle: The right ventricle is normal in size. There is normal right ventricular global systolic function. Right ventricle is only visualized is off axis images-but appears to have grossly normal structure and function. Right Atrium: The right atrium was not assessed. Aortic Valve: The aortic valve is trileaflet. There is no evidence of aortic valve regurgitation. The peak instantaneous gradient of the aortic valve is 10.5 mmHg. The mean gradient of the aortic valve is 7.0 mmHg. Mitral Valve: The mitral valve is abnormal. There is no evidence of mitral valve regurgitation. Calcified mitral annulus. Restricted posterior leaflet mobility. Tricuspid Valve: The tricuspid valve is structurally normal. There is trace tricuspid regurgitation. Pulmonic Valve: The pulmonic valve is structurally normal. There is no indication of pulmonic valve regurgitation. Pericardium: There is no pericardial effusion noted. Aorta: The aortic root is normal. Systemic Veins: The inferior vena cava appears to be of normal size.  CONCLUSIONS:  1. Left ventricular systolic function is normal with a 60% estimated ejection fraction. QUANTITATIVE DATA SUMMARY: 2D MEASUREMENTS:                          Normal Ranges: Ao Root d:     2.90 cm   (2.0-3.7cm) LAs:           3.40 cm   (2.7-4.0cm) IVSd:          0.84 cm   (0.6-1.1cm) LVPWd:         0.90 cm   (0.6-1.1cm) LVIDd:         4.15 cm   (3.9-5.9cm) LVIDs:         2.89 cm LV Mass Index: 58.5 g/m2 LV % FS        30.4 % LA VOLUME:                             Normal Ranges: LA Area A2C:     15.0 cm2 LA Volume Index: 19.9 ml/m2 LA Vol A4C:      37.8 ml LV SYSTOLIC FUNCTION BY 2D PLANIMETRY (MOD):                     Normal Ranges: EF-A4C View: 65.6 % (>=55%) AORTIC VALVE:                                    Normal Ranges: AoV Vmax:                1.62 m/s  (<=1.7m/s) AoV Peak PG:             10.5 mmHg (<20mmHg) AoV Mean P.0 mmHg  (1.7-11.5mmHg) LVOT Max [place]:             0.65 m/s  (<=1.1m/s) AoV VTI:                 27.10 cm  (18-25cm) LVOT VTI:                12.40 cm LVOT Diameter:           1.90 cm   (1.8-2.4cm) AoV Area, VTI:           1.30 cm2  (2.5-5.5cm2) AoV Area,Vmax:           1.14 cm2  (2.5-4.5cm2) AoV Dimensionless Index: 0.46  RIGHT VENTRICLE: RV Basal 4.07 cm RV Mid   3.16 cm RV Major 7.2 cm PULMONIC VALVE:                        Normal Ranges: PV Accel Time: 77 msec (>120ms)  [id number] Elan Ramirez MD Electronically signed on 2/20/2024 at 9:08:50 AM  ** Final **     US gallbladder    Result Date: 2/19/2024  Interpreted By:  Rody Warner, STUDY: US GALLBLADDER;  2/19/2024 10:09 am   INDICATION: Signs/Symptoms:sepsis.   COMPARISON: CT abdomen and pelvis obtained earlier the same day   ACCESSION NUMBER(S): [id number]   ORDERING CLINICIAN: LIA DE LA TORRE   TECHNIQUE: Multiple images of the right upper quadrant were obtained.   FINDINGS: LIVER:  Right lobe measures 18.3 cm in craniocaudal length. Slightly increased echogenicity. intact hepatopetal flow on brief duplex Doppler evaluation of the main portal vein. No focal lesion demonstrated.   GALLBLADDER:   Borderline dilated measuring 4.7 cm transversely with roughly 2 cm echogenic shadowing stone and subtle nonshadowing luminal echoes. Anterior wall is thickened at 6 mm. Sonographic Espinoza sign is indeterminate due to patient sedation.   BILIARY TREE: The common duct measures  8 mm.   PANCREAS:   Largely obscured by overlying bowel gas.   RIGHT KIDNEY:   Normal in size. No hydronephrosis. 1.1 cm cyst in the inferior pole.       Shadowing stone and sludge within borderline dilated thick-walled gallbladder. Equivocal Espinoza sign due to patient sedation. Acute cholecystitis not excluded. Clinical correlation recommended.   Enlarged slightly echogenic likely fatty infiltrated liver.   Small right renal cyst.   The pancreas is not adequately visualized on this exam.   MACRO: None.   Signed by: Rody Warner  2/19/2024 11:25 AM Dictation workstation:   [id number]    CT chest abdomen pelvis wo IV contrast    Result Date: 2/19/2024  Interpreted By:  Jonny Owen, STUDY: CT CHEST ABDOMEN PELVIS WO CONTRAST;  2/19/2024 6:58 am   INDICATION: Signs/Symptoms:Fever, report of abdominal pain.   COMPARISON: None.   ACCESSION NUMBER(S): [id number]   ORDERING CLINICIAN: ANNA CRAVEN   TECHNIQUE: Contiguous axial CT sections are performed from the thoracic inlet to the lesser trochanters without intravenous or enteric contrast. The study is supplemented with coronal and sagittal reformatted images.   FINDINGS: The lungs are limited by motion degradation. There is faint nonspecific ground-glass density in both lungs and some basilar dependent edema or atelectasis in the left posterior costophrenic angle. Subsegmental atelectasis is also identified inferiorly in the lingula. There is otherwise no airspace consolidation.   There is a 7 mm bulla in the left upper lobe.   There are atherosclerotic arterial calcifications of the thoracic aorta and coronary arteries. The aorta is of normal caliber. There is no sign of pathologic lymph node enlargement in the mediastinum, pulmonary ashlee, or axilla. There is no mediastinal fluid collection or pneumothorax. There is no pericardial or pleural effusion.   There is a small hiatal hernia.   The gallbladder is distended. Partially calcified gallstones are identified within the gallbladder fundus and the dependent gallbladder body towards the neck. This appearance is unchanged from 10/11/2023. A mild degree of gallbladder prominence can not be excluded this appearance could be related to motion degradation.   The liver, spleen, pancreas, and left adrenal gland are of normal CT appearance. There is nodular enlargement of the right adrenal gland at the posterior limb measuring 2.0 x 1.4 cm in diameter. The appearance is similar to the previous study.   The kidneys are symmetric in size.  Vascular calcifications are identified in both renal ashlee. There is suspected 3 mm calculus in the mid left kidney. There is a larger 7 x 5 mm calculus in the left renal pelvis just proximal to the UPJ. There is left perinephric stranding and trace fluid in some thickening of the perirenal fascia. There is trace fluid and increased density in the left pericolic gutter. There is minimal periureteral stranding on the left. There is a 3 mm calcification along the course of the distal left ureter which is unchanged from the previous study and more likely represents a vascular calcification. There is no right-sided hydronephrosis or hydroureter. There is no obstructing right ureteral calculus.   There are diffuse atherosclerotic arterial calcifications of the abdominal aorta. The aorta is of normal caliber. There is no periaortic mass or fluid collection. There is no pathologic lymph node enlargement along the iliac chains or in the groin soft tissues.   There is no free air collection in the abdomen or pelvis.   There is no bowel distension. Multiple diverticula are identified in the distal colon at the descending and sigmoid colon. The appendix is of normal appearance. The rectum is filled with stool and mildly distended. There is no wall thickening.   The urinary bladder is partially distended and unremarkable. There is no bladder calculus or wall thickening. The pelvic viscera are unremarkable. There is no herniated bowel.   There are multilevel discogenic degenerative changes of the thoracolumbar spine with diffuse osteopenia. These findings are more pronounced within the lumbar spine and greater at L2-3 and L3-4, unchanged from 10/11/2023. There is persistent posterior subluxation of L3 and anterior subluxation of L4 which is likely degenerative. There is central canal narrowing at L2-3 and L3-4 and bilateral neural foraminal narrowing, greater at from L3 through S1. There are osteoarthritic changes of both hips  and sacroiliac joints. There is no definite lytic lesion. There is chronic appearing fracture/deformity of the medial right clavicle and degenerative changes of both shoulders. The remaining visualized osseous structures are grossly intact         Mild atherosclerotic arterial calcifications of the coronary arteries and thoracic aorta.   Small hiatal hernia.   Mildly distended gallbladder with cholelithiasis. There is mild wall prominence which is more likely related to motion. No surrounding fluid is identified. This can be further evaluated with gallbladder ultrasound.   Left perinephric stranding and some increased density in the left pericolic gutter. Consider intermittent obstructive uropathy at the UPJ. There is currently no significant hydronephrosis. In the setting of abdominal pain and fever, correlate with urinalysis to exclude underlying urinary tract infection.   Distal colonic diverticulosis. There is some increased density in the pericolic gutter with equivocal wall thickening of the mid descending colon. Mild degree of descending diverticulitis is suspected in the clinical setting of abdominal pain and fever. Continued surveillance is recommended.   Normal appendix.   Increased stool within the rectum with mild rectal distention. There is no wall thickening.   Severe multilevel discogenic degenerative changes of the lumbar spine with central canal and neural foraminal stenosis in the lower lumbar spine unchanged from 10/11/2023.   3 mm left renal calculus.   Signed by: Jonny Owen 2/19/2024 7:33 AM Dictation workstation:   [id number]    ECG 12 lead    Result Date: 2/19/2024  Undetermined rhythm Low voltage QRS Cannot rule out Anterior infarct , age undetermined Abnormal ECG When compared with ECG of 11-OCT-2023 01:57, Current undetermined rhythm precludes rhythm comparison, needs review Borderline criteria for Inferior infarct are no longer Present ST now depressed in Inferior leads ST less  depressed in Lateral leads    XR chest 1 view    Result Date: 2/19/2024  Interpreted By:  Michael Hopson, STUDY: XR CHEST 1 VIEW;  2/19/2024 5:52 am   INDICATION: Signs/Symptoms:Altered mental status.   COMPARISON: Chest radiograph dated 05/24/2023.   ACCESSION NUMBER(S): [id number]   ORDERING CLINICIAN: ANNA CRAVEN   FINDINGS:   CARDIOMEDIASTINAL SILHOUETTE: Cardiomediastinal silhouette is enlarged and stable..   LUNGS/PLEURA: There are no consolidations.There are no pleural effusions. There is no demonstrated pneumothorax.     BONES: No evidence of acute osseous abnormality. Partially visualized cervical spine instrumentation.       1.  No evidence of acute cardiopulmonary process. Cardiomegaly.     Signed by: Michael Hopson 2/19/2024 6:10 AM Dictation workstation:   [id number]      Results for orders placed or performed during the hospital encounter of 02/19/24 (from the past 24 hour(s))   Heparin Assay, UFH   Result Value Ref Range    Heparin Unfractionated 1.8 (HH) See Comment Below for Therapeutic Ranges IU/mL   Lactate   Result Value Ref Range    Lactate 2.6 (H) 0.4 - 2.0 mmol/L   Green Top   Result Value Ref Range    Extra Tube Hold for add-ons.    POCT GLUCOSE   Result Value Ref Range    POCT Glucose 212 (H) 74 - 99 mg/dL   Heparin Assay   Result Value Ref Range    Heparin Unfractionated 1.4 (HH) See Comment Below for Therapeutic Ranges IU/mL   CBC   Result Value Ref Range    WBC 27.2 (H) 4.4 - 11.3 x10*3/uL    nRBC 0.0 0.0 - 0.0 /100 WBCs    RBC 3.76 (L) 4.00 - 5.20 x10*6/uL    Hemoglobin 11.2 (L) 12.0 - 16.0 g/dL    Hematocrit 35.4 (L) 36.0 - 46.0 %    MCV 94 80 - 100 fL    MCH 29.8 26.0 - 34.0 pg    MCHC 31.6 (L) 32.0 - 36.0 g/dL    RDW 14.0 11.5 - 14.5 %    Platelets 148 (L) 150 - 450 x10*3/uL   Comprehensive metabolic panel   Result Value Ref Range    Glucose 211 (H) 74 - 99 mg/dL    Sodium 140 136 - 145 mmol/L    Potassium 4.0 3.5 - 5.3 mmol/L    Chloride 113 (H) 98 - 107 mmol/L    Bicarbonate 17  (L) 21 - 32 mmol/L    Anion Gap 14 10 - 20 mmol/L    Urea Nitrogen 26 (H) 6 - 23 mg/dL    Creatinine 0.89 0.50 - 1.05 mg/dL    eGFR 70 >60 mL/min/1.73m*2    Calcium 7.7 (L) 8.6 - 10.3 mg/dL    Albumin 3.0 (L) 3.4 - 5.0 g/dL    Alkaline Phosphatase 90 33 - 136 U/L    Total Protein 5.6 (L) 6.4 - 8.2 g/dL    AST 26 9 - 39 U/L    Bilirubin, Total 0.6 0.0 - 1.2 mg/dL    ALT 23 7 - 45 U/L   Light Blue Top   Result Value Ref Range    Extra Tube Hold for add-ons.    POCT GLUCOSE   Result Value Ref Range    POCT Glucose 196 (H) 74 - 99 mg/dL   POCT GLUCOSE   Result Value Ref Range    POCT Glucose 163 (H) 74 - 99 mg/dL   POCT GLUCOSE   Result Value Ref Range    POCT Glucose 162 (H) 74 - 99 mg/dL   Lactate   Result Value Ref Range    Lactate 1.8 0.4 - 2.0 mmol/L                                Assessment/Plan   Principal Problem:    Septic shock (CMS/HCC)      68-year-old female presented with  Septic shock  UTI  Acute cholecystitis  PAOLA  Lactic acidosis  Metabolic encephalopathy  History of hypertension  Hyperlipidemia  GERD  DVT on Eliquis  Left renal stone  Degenerative disc disease  Diabetes mellitus type 2  History of possible CHF     Plan  In ICU   Cardiopulmonary monitoring  Follow vitals including pulse ox check  On sepsis pathway  Continue IV fluids carefully and gently  S/p Levophed to keep MAP above 65 and wean as tolerated, d/w staff - off pressors now  Supplemental oxygen if required  Zosyn IV  Follow blood and urine cultures  Follow-up IR and surgery s/p cholecystostomy tube drainage   lactate ok  All labs are reviewed by me independently  Troponins not  impressive   Echocardiogram LVEF 60%  Daily CBC BMP and monitor urine output  Hold antihypertension medicines  Bowel regimen for constipation prophylaxis  Follow clinically  Supportive care symptomatic management  Dilaudid IV for pain control and antiemetics using Zofran as needed  Tylenol for fever  Heparin drip for history of DVT/PE- held due to blood in IR tube    PPI for GI prophylaxis  Monitor fingerstick  Watch for any volume overload  Staff concerned about pain- repeat CT A/P  Follow clinically, somewhat tender and minimal guarding - to monitor clinically  ??ileus to watch for   Follow imaging               Enrique Guevara MD

## 2024-02-21 NOTE — PROGRESS NOTES
Physical Therapy                 Therapy Communication Note    Patient Name: Hannah Pedraza  MRN: [id number]  Today's Date: 2/21/2024     Discipline: Physical Therapy    Missed Visit Reason: Missed Visit Reason: Patient in a medical procedure (patient being taken down to medical testing.  Per nurse patient is not alert.  patient is from LTC facility and uses a wheelchair.   She is not ambulatory at baseline)    Missed Time: Attempt    Comment:

## 2024-02-21 NOTE — PROGRESS NOTES
Pt reviewed during Care Rounds today and she is not ready for discharge. Update from [facility] asking for therapies, as they may try to skill pt upon discharge, but pt was already wheelchair bound at baseline.  Updates attached in CarePort. Care Transitions will continue to follow.     DEX Su

## 2024-02-21 NOTE — PROGRESS NOTES
Occupational Therapy                 Therapy Communication Note    Patient Name: Hannah Pedraza  MRN: [id number]  Today's Date: 2/21/2024     Discipline: Occupational Therapy    Missed Visit Reason: Missed Visit Reason: Patient in a medical procedure (patient being taken down to medical testing. Per nurse patient is not alert. patient is from LTC facility and uses a wheelchair. She is not ambulatory at baseline)

## 2024-02-21 NOTE — PROGRESS NOTES
Hannah Pedraza is a 68 y.o. female on day 2 of admission presenting with Septic shock (CMS/HCC).      Subjective   Patient seen and examined at the bedside this morning  She is resting fairly comfortably in bed however she is still in quite a bit of pain  She is a little bit lethargic as well this morning  Overnight issues and events noted  Drain is draining minimal bloody fluid         Objective          Vitals 24HR  Heart Rate:  []   Temp:  [36 °C (96.8 °F)-37.1 °C (98.7 °F)]   Resp:  [9-26]   BP: ()/(63-91)   SpO2:  [90 %-95 %]         Intake/Output last 3 Shifts:    Intake/Output Summary (Last 24 hours) at 2/21/2024 1120  Last data filed at 2/21/2024 0930  Gross per 24 hour   Intake 3629.17 ml   Output 1442 ml   Net 2187.17 ml       Physical Exam  Constitutional:       Appearance: She is obese. She is ill-appearing.      Comments: Lethargic   HENT:      Head: Normocephalic and atraumatic.      Right Ear: External ear normal.      Left Ear: External ear normal.      Nose: Nose normal.      Mouth/Throat:      Mouth: Mucous membranes are moist.      Pharynx: Oropharynx is clear.   Eyes:      Extraocular Movements: Extraocular movements intact.      Conjunctiva/sclera: Conjunctivae normal.      Pupils: Pupils are equal, round, and reactive to light.   Cardiovascular:      Rate and Rhythm: Normal rate and regular rhythm.   Pulmonary:      Effort: Pulmonary effort is normal.      Breath sounds: Normal breath sounds.   Abdominal:      General: Abdomen is flat.      Palpations: Abdomen is soft.      Comments: Drain in place on right upper quadrant.  Bloody drainage   Skin:     General: Skin is warm and dry.   Neurological:      General: No focal deficit present.      Mental Status: She is alert.      Comments: Lethargic and sleepy this a.m.       Scheduled medications  aspirin, 81 mg, oral, Nightly  atorvastatin, 40 mg, oral, Daily  DULoxetine, 60 mg, oral, Daily  famotidine, 20 mg, oral, Daily    Or  famotidine, 20 mg, intravenous, Daily  ferrous sulfate (325 mg ferrous sulfate), 65 mg of iron, oral, BID  gabapentin, 300 mg, oral, TID  insulin lispro, 0-5 Units, subcutaneous, TID with meals  lubricating eye drops, 1 drop, Both Eyes, TID  nystatin, 1 Application, Topical, BID  piperacillin-tazobactam, 3.375 g, intravenous, q6h  polyethylene glycol, 17 g, oral, Daily  sennosides, 1 tablet, oral, BID      Continuous medications  lactated Ringer's, 125 mL/hr, Last Rate: 125 mL/hr (02/21/24 0930)      PRN medications  PRN medications: acetaminophen **OR** acetaminophen **OR** acetaminophen, acetaminophen **OR** acetaminophen **OR** acetaminophen, bisacodyl, dextrose 10 % in water (D10W), dextrose, glucagon, HYDROmorphone, HYDROmorphone, ondansetron ODT **OR** ondansetron, oxygen, oxymetazoline    Relevant Results               Assessment/Plan   This patient currently has cardiac telemetry ordered; if you would like to modify or discontinue the telemetry order, click here to go to the orders activity to modify/discontinue the order.          Principal Problem:    Septic shock (CMS/[facility])    Septic shock:: Resolved  Klebsiella pneumonia a bacteremia with likely source the urine with gram-negative bacilli growing in the urine  Acute cholecystitis with drain i malposition  Urinary tract infection  History of DVT chronically on Eliquis  Lactic acidosis: Resolved  Normal anion gap metabolic acidosis with hyperchloremia: Improved  Obesity  Acid reflux  Metabolic encephalopathy  Thrombocytopenia  Anemia  Hypoalbuminemia with protein calorie malnutrition    Plan:  At this time she is on Zosyn empirically with bacteremia with Klebsiella pneumonia likely source is the urine with gram-negative bacilli growing from the urine as well  Vitally she is stable  Her white count has improved  Dr. Balderas managing her drain and abdominal process  Her white count has decreased her platelet count has also fallen and historically she had  normal platelets and has fallen fairly dramatically  She was chronically on a DOAC  At this time I would recommend continuing off of heparin  Drain is in place currently and draining bloody fluid and will need to be removed  Plan is for a HIDA scan  From my standpoint she is clinically looking much improved her acidosis has almost resolved on lactated Ringer's  I will sign off and defer rest of management to primary and surgery  Please call with any further issues or needs             Zack Light, DO

## 2024-02-21 NOTE — PROGRESS NOTES
General Surgery Afternoon Rounds    Patient moaning with pain,  at bedside, but will not verbalize where pain is located. HIDA confirmed suspicion of cholecystitis. She also has the left renal stone with partial obstruction and UTI along with pneumonia. She is showing some signs of improvement compared to on admission. She is off of pressers. Her HR is in low 90's (improved from low 100's this AM), 's systolic. Her WBC was decreased this AM compared to yesterday. She had some bleeding from the cholecystostomy tube site and suspected small bile leakage as well seen on HIDA. I removed the drain this afternoon. Recommend keeping NPO. Continuing antibiotics. If continues to show signs of clinical improvement, will just treat with antibiotics. If worsening, will consider possible repeat attempt at cholecystostomy tube placement    Kindra Balderas MD  02/21/24  4:43 PM

## 2024-02-21 NOTE — PROGRESS NOTES
"General Surgery Progress Note    Patient is a 68 y.o. female with cholecystitis and UTI. Cholecystostomy tube in liver but not extending into gallbladder. Hgb stable. Heparin gtt held. WBC trending down, but still 20. Complaining of pain but pain is in neck, back, and abdomen, non-specific.    /72   Pulse 102   Temp 37.1 °C (98.7 °F)   Resp 12   Ht 1.499 m (4' 11\")   Wt 95.2 kg (209 lb 14.1 oz)   SpO2 93%   BMI 42.39 kg/m²   NAD, laying supine in bed, sleeping  No labored breathing, on supplemental oxygen via nasal cannula  Sinus low grade tachycardia, -104  Abdomen soft, obese, mildly tender in RUQ, drain with clot in bag      Intake/Output Summary (Last 24 hours) at 2/21/2024 0650  Last data filed at 2/21/2024 0543  Gross per 24 hour   Intake 3156.25 ml   Output 1442 ml   Net 1714.25 ml     WBC 20.1 (from 27.2)  Hgb stable at 11.2    CT a/p from yesterday afternoon reviewed: cholecystostomy tube coiled in liver, not extending to gallbladder    Patient is a 68 y.o. female with cholecystitis and UTI. Cholecystostomy tube not within gallbladder. Hold anticoagulation. Keep NPO. Showing some improvement with antibiotics, but still with significant leukocytosis. Will discuss with Dr. Barrera if removing/replacing tube is an option. Continue IV antibiotics.    Kindra Balderas MD  02/21/24  6:59 AM      Addendum:  I spoke with Dr. Barrera. Will obtain HIDA scan this AM to evaluate patency of cystic duct. If patent, will just treat with antibiotics. Will need current drain removed as it is malpositioned and not functional. Continue to hold anticoagulation.    Kindra Balderas MD  02/21/24  8:37 AM        "

## 2024-02-21 NOTE — PROGRESS NOTES
Hannah Pedraza is a 68 y.o. female on day 2 of admission presenting with Septic shock (CMS/HCC).      Subjective   Patient seen in room, bedside.  Nursing room.  Continues to have pain in the abdomen, just got her morphine.  No fevers.  No nausea or vomiting.  Not passed gas.    Objective     Last Recorded Vitals  /68 (BP Location: Left arm, Patient Position: Lying)   Pulse 100   Temp 36.6 °C (97.8 °F) (Temporal)   Resp (!) 9   Wt 95.2 kg (209 lb 14.1 oz)   SpO2 95%   Intake/Output last 3 Shifts:    Intake/Output Summary (Last 24 hours) at 2/21/2024 1106  Last data filed at 2/21/2024 0930  Gross per 24 hour   Intake 3629.17 ml   Output 1442 ml   Net 2187.17 ml       Admission Weight  Weight: 84.4 kg (186 lb 1.6 oz) (02/19/24 0525)    Daily Weight  02/19/24 : 95.2 kg (209 lb 14.1 oz)    Image Results  CT abdomen pelvis w IV contrast  Narrative: Interpreted By:  Loulou Jiménez,   STUDY:  CT ABDOMEN PELVIS W IV CONTRAST;  2/20/2024 6:49 pm      INDICATION:  Signs/Symptoms:pain.      COMPARISON:  CT chest, abdomen, pelvis 02/19/2024. Gallbladder ultrasound  02/19/2024.  CT abdomen and pelvis 10/11/2023      ACCESSION NUMBER(S):  [id number]      ORDERING CLINICIAN:  LYNN LÓPEZ      TECHNIQUE:  Axial CT of the abdomen and pelvis was performed. Coronal and  sagittal reconstructions were performed.      Intravenous contrast material was administered without immediate  complication.      FINDINGS:  LOWER CHEST:  There is bibasilar atelectasis.      ABDOMEN:      LIVER:  A percutaneous catheter is coiled at the inferior right hepatic lobe,  not extending into the gallbladder.      BILE DUCTS:  There is mild intrahepatic biliary ductal dilation.      GALLBLADDER:  The gallbladder is distended. There is cholelithiasis. There is a  gallstone at the gallbladder neck.      PANCREAS:  No peripancreatic inflammatory changes.      SPLEEN:  Unremarkable.      ADRENAL GLANDS:  There is 2.5 x 1.4 cm nodule at the right  adrenal gland. The left  adrenal gland is unremarkable.      KIDNEYS AND URETERS:  There is mild left hydronephrosis. There is an 11 mm calculus at the  proximal left ureter, at L3 level. No right hydronephrosis or  hydroureter. There are left renal cysts, measuring up to 11 mm.  There is a 12 mm hypodense lesion at the inferior aspect of the right  kidney, measuring in attenuation more than simple cyst.      PELVIS:      BLADDER:  Distended.      REPRODUCTIVE ORGANS:  The uterus is present.      BOWEL:  No bowel obstruction. There is colonic diverticulosis with no CT  evidence for acute diverticulitis. The appendix is normal.      VESSELS:  Atherosclerotic calcifications of the abdominal aorta and its  branches. No abdominal aortic aneurysm.      PERITONEUM/RETROPERITONEUM/LYMPH NODES:  Small amount of free fluid at the abdomen and pelvis. No free air.  No retroperitoneal hemorrhage. No pathologically enlarged lymph nodes  are identified.      BONES AND ABDOMINAL WALL:  Multilevel degenerative changes are noted in the spine.  Redemonstration of compression deformity L3 vertebral body. There is  mild anterolisthesis of L2 on L3 and of L4 on L5 and there is mild  retrolisthesis of L3 on L4 and of L5 on S1. There is a small fat  containing umbilical hernia.      Impression: 1. Cholelithiasis. Distended gallbladder with gallstone at the  gallbladder neck, please correlate for acute cholecystitis. Mild  intrahepatic biliary ductal dilation.  2. Percutaneous tube coiled at the inferior right hepatic lobe, not  extending into the gallbladder.  3. Left-sided obstructive uropathy with mild left hydronephrosis and  an 11 mm calculus at the proximal left ureter.  4. Right adrenal gland nodule and right renal hypodense lesion,  indeterminate. Nonemergent contrast-enhanced MRI can be obtained for  further characterization.  5. Colonic diverticulosis.  6. Small amount of ascites.          MACRO:  Critical Finding:  See findings.  Notification was initiated on  2/20/2024 at 8:47 pm by  Loulou Jiménez.  (**-YCF-**) Instructions:      Signed by: Loulou Jiménez 2/20/2024 8:53 PM  Dictation workstation:   [id number]  US guided abscess fluid collection drainage  Narrative: Interpreted By:  Jefe Barrera,   STUDY:  US GUIDED ABSCESS FLUID COLLECTION DRAINAGE;  2/20/2024 10:42 am      INDICATION:  Signs/Symptoms:Fluid collection.      COMPARISON:  None.      ACCESSION NUMBER(S):  [id number]      ORDERING CLINICIAN:  LYNN LÓPEZ      TECHNIQUE:  CT guided percutaneous cholecystostomy tube placement      FINDINGS:  The procedure and the benefits, risks and potential complications in  addition to alternatives of the procedure were discussed with the  patient by Dr. Barrera and signed informed consent was obtained.      Patient's vital signs were monitored by the radiology nurse.      Limited ultrasound images were obtained through the right upper  quadrant. The images demonstrated  the gallbladder to be dilated,  measuring at up to 5.1 cm in diameter. Mild diffuse gallbladder wall  thickening is present.. The patient prepped in the usual sterile  manner. 1% lidocaine was used for local anesthesia at the planned  skin insertion site.      Under direct ultrasound guidance, an 8.5 Salvadorean needle/catheter was  placed into the gallbladder via a percutaneous subcostal transhepatic  approach. After confirmation of position of the needle within the  gallbladder, the inner needle/stylette was withdrawn. After  confirmation of position of the catheter within the collection, the  guidewire was removed, the pigtail fixed and the catheter secured to  the skin. The catheter was then set to gravity drainage.      Postprocedure images demonstrated no evidence of hemorrhage.      Patient tolerated the procedure without immediate complication. Fluid  was sent to the laboratory and cytology for further analysis.      Impression: Successful percutaneous  cholecystotomy tube placement, as above.      Signed by: Jefe Barrera 2/20/2024 11:24 AM  Dictation workstation:   [id number]  Transthoracic Echo (TTE) [place] [address]  Phone [phone], Fax [phone]    TRANSTHORACIC ECHOCARDIOGRAM REPORT       Patient Name:      KATIE GOODWIN     Reading Physician:    [id number] Elan Ramirez MD  Study Date:        2/19/2024             Ordering Provider:    [id number] LYNN LÓPEZ  MRN/PID:           [id number]              Fellow:  Accession#:        [id number]          Nurse:                Emily Hinds RN  Date of Birth/Age: 1955 / 68 years  Sonographer:          Jeremiah Brennan RDCS  Gender:            F                     Additional Staff:  Height:            152.40 cm             Admit Date:           2/18/2024  Weight:            94.80 kg              Admission Status:     Inpatient -                                                                 Routine  BSA / BMI:         1.90 m2 / 40.82 kg/m2 Department Location:  [address]-ICU  Blood Pressure: 131 /79 mmHg    Study Type:    TRANSTHORACIC ECHO (TTE) COMPLETE  Diagnosis/ICD: 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 Definity used as a contrast agent for endocardial                border definition. Total contrast used for this procedure was                2.00cc mL via IV push.       PHYSICIAN INTERPRETATION:  Left Ventricle: Left ventricular systolic function is normal, with an estimated ejection fraction of 60%. There are no regional wall motion abnormalities. The left  ventricular cavity size is normal. Left ventricular diastolic filling was indeterminate.  Left Atrium: The left atrium is normal in size.  Right Ventricle: The right ventricle is normal in size. There is normal right ventricular global systolic function. Right ventricle is only visualized is off axis images-but appears to have grossly normal structure and function.  Right Atrium: The right atrium was not assessed.  Aortic Valve: The aortic valve is trileaflet. There is no evidence of aortic valve regurgitation. The peak instantaneous gradient of the aortic valve is 10.5 mmHg. The mean gradient of the aortic valve is 7.0 mmHg.  Mitral Valve: The mitral valve is abnormal. There is no evidence of mitral valve regurgitation. Calcified mitral annulus. Restricted posterior leaflet mobility.  Tricuspid Valve: The tricuspid valve is structurally normal. There is trace tricuspid regurgitation.  Pulmonic Valve: The pulmonic valve is structurally normal. There is no indication of pulmonic valve regurgitation.  Pericardium: There is no pericardial effusion noted.  Aorta: The aortic root is normal.  Systemic Veins: The inferior vena cava appears to be of normal size.       CONCLUSIONS:   1. Left ventricular systolic function is normal with a 60% estimated ejection fraction.    QUANTITATIVE DATA SUMMARY:  2D MEASUREMENTS:                           Normal Ranges:  Ao Root d:     2.90 cm   (2.0-3.7cm)  LAs:           3.40 cm   (2.7-4.0cm)  IVSd:          0.84 cm   (0.6-1.1cm)  LVPWd:         0.90 cm   (0.6-1.1cm)  LVIDd:         4.15 cm   (3.9-5.9cm)  LVIDs:         2.89 cm  LV Mass Index: 58.5 g/m2  LV % FS        30.4 %    LA VOLUME:                              Normal Ranges:  LA Area A2C:     15.0 cm2  LA Volume Index: 19.9 ml/m2  LA Vol A4C:      37.8 ml    LV SYSTOLIC FUNCTION BY 2D PLANIMETRY (MOD):                      Normal Ranges:  EF-A4C View: 65.6 % (>=55%)    AORTIC VALVE:                                     Normal  Ranges:  AoV Vmax:                1.62 m/s  (<=1.7m/s)  AoV Peak PG:             10.5 mmHg (<20mmHg)  AoV Mean P.0 mmHg  (1.7-11.5mmHg)  LVOT Max [place]:            0.65 m/s  (<=1.1m/s)  AoV VTI:                 27.10 cm  (18-25cm)  LVOT VTI:                12.40 cm  LVOT Diameter:           1.90 cm   (1.8-2.4cm)  AoV Area, VTI:           1.30 cm2  (2.5-5.5cm2)  AoV Area,Vmax:           1.14 cm2  (2.5-4.5cm2)  AoV Dimensionless Index: 0.46       RIGHT VENTRICLE:  RV Basal 4.07 cm  RV Mid   3.16 cm  RV Major 7.2 cm    PULMONIC VALVE:                         Normal Ranges:  PV Accel Time: 77 msec (>120ms)       [id number] Elan Ramirez MD  Electronically signed on 2024 at 9:08:50 AM       ** Final **      Physical Exam  Gen: NAD, A&O x 3, Well developed  HEENT: Normal size, shape; no masses noted in neck, no lymphadenopathy, no tracheal deviation, non-palpable thyroid  Neck: Supple.Trachea central.No neck rigidity.  Chest: chest symmetry with respirations, no wheezes, crackles  CVS: RRR, no rubs  Abd: Distended, soft, generalized tenderness, no guarding, rigidity, rebound tenderness.  Bowel sounds present.    Ext: no Clubbing/Cyanosis/edema, non-tender.Pulse 2+. Homans Negative.  Skin: Dry, warm, good condition  Neuro: grossly normal  Psy: Mood and affect appropriate   Relevant Results               Assessment/Plan   This patient currently has cardiac telemetry ordered; if you would like to modify or discontinue the telemetry order, click here to go to the orders activity to modify/discontinue the order.              Principal Problem:    Septic shock (CMS/HCC)  UTI, urine cultures with enteric bacilli and gram-negative bacilli  Klebsiella pneumonia  bacteremia, new  Acute cholecystitis, s/p cholecystectomy tube  PAOLA  Lactic acidosis  Metabolic encephalopathy  History of hypertension  Hyperlipidemia  GERD  DVT on Eliquis  Left renal stone  Degenerative disc disease  Diabetes mellitus type 2  History of  possible CHF       Plan    HIDA scan ordered this morning.  Continue to be n.p.o., IV fluids, IV pain medications.  Sepsis resolved.  Off Levophed.  Continue with IV Zosyn.  Further titration of antibiotics per full culture results.  Reviewed echo, LVEF 60%.  Strict ins and outs.  Daily weights.  Appreciate general surgery input.  Hold anticoagulation secondary due to possible gallbladder tube placement.  Recheck labs.  Continue with insulin.    CODE STATUS full.  DVT prophylaxis on heparin.  Disposition to be decided.    Total critical care time 35 minutes.         Reta Lea MD

## 2024-02-21 NOTE — CARE PLAN
The patient's goals for the shift include  pain level control    The clinical goals for the shift include improvement in hemodynamics, improve orientation, rest/sleep    Over the shift, the patient did not make progress toward the following goals. Barriers to progression include pt confusion, pain medication frequency to keep pain level less than 5. Will cont to monitor/bed alarm maintained and call light within pt reach.  Problem: Skin  Goal: Prevent/manage excess moisture  Outcome: Progressing  Goal: Prevent/minimize sheer/friction injuries  Outcome: Progressing  Goal: Promote skin healing  Outcome: Progressing     Problem: Perfusion/Cardiac  Goal: Adequate perfusion to organs/extremities  Outcome: Progressing  Goal: Hemodynamically stable  Outcome: Progressing  Goal: No cardiac arrhythmias  Outcome: Progressing     Problem: Respiratory/Oxygenation  Goal: No signs of respiratory compromise  Outcome: Progressing  Goal: Tolerates activity without increased O2 demands  Outcome: Progressing     Problem: Neuro/Coping  Goal: Minimal anxiety; utilize coping mechanisms  Outcome: Progressing   .

## 2024-02-22 ENCOUNTER — PREP FOR PROCEDURE (OUTPATIENT)
Dept: UROLOGY | Facility: CLINIC | Age: 69
End: 2024-02-22
Payer: COMMERCIAL

## 2024-02-22 DIAGNOSIS — N20.1 CALCULUS OF URETER: ICD-10-CM

## 2024-02-22 DIAGNOSIS — N13.2 HYDRONEPHROSIS WITH URINARY OBSTRUCTION DUE TO URETERAL CALCULUS: ICD-10-CM

## 2024-02-22 LAB
ALBUMIN SERPL BCP-MCNC: 2.5 G/DL (ref 3.4–5)
ALP SERPL-CCNC: 88 U/L (ref 33–136)
ALT SERPL W P-5'-P-CCNC: 18 U/L (ref 7–45)
ANION GAP SERPL CALC-SCNC: 11 MMOL/L (ref 10–20)
AST SERPL W P-5'-P-CCNC: 19 U/L (ref 9–39)
BACTERIA BLD AEROBE CULT: ABNORMAL
BACTERIA BLD AEROBE CULT: ABNORMAL
BACTERIA BLD CULT: ABNORMAL
BACTERIA BLD CULT: ABNORMAL
BILIRUB SERPL-MCNC: 1.9 MG/DL (ref 0–1.2)
BUN SERPL-MCNC: 24 MG/DL (ref 6–23)
CALCIUM SERPL-MCNC: 8.3 MG/DL (ref 8.6–10.3)
CHLORIDE SERPL-SCNC: 111 MMOL/L (ref 98–107)
CO2 SERPL-SCNC: 22 MMOL/L (ref 21–32)
CREAT SERPL-MCNC: 0.63 MG/DL (ref 0.5–1.05)
EGFRCR SERPLBLD CKD-EPI 2021: >90 ML/MIN/1.73M*2
ERYTHROCYTE [DISTWIDTH] IN BLOOD BY AUTOMATED COUNT: 13.8 % (ref 11.5–14.5)
GLUCOSE BLD MANUAL STRIP-MCNC: 101 MG/DL (ref 74–99)
GLUCOSE BLD MANUAL STRIP-MCNC: 104 MG/DL (ref 74–99)
GLUCOSE BLD MANUAL STRIP-MCNC: 104 MG/DL (ref 74–99)
GLUCOSE BLD MANUAL STRIP-MCNC: 92 MG/DL (ref 74–99)
GLUCOSE SERPL-MCNC: 109 MG/DL (ref 74–99)
GRAM STN SPEC: ABNORMAL
HCT VFR BLD AUTO: 29.4 % (ref 36–46)
HGB BLD-MCNC: 9.6 G/DL (ref 12–16)
HOLD SPECIMEN: NORMAL
HOLD SPECIMEN: NORMAL
MCH RBC QN AUTO: 30.9 PG (ref 26–34)
MCHC RBC AUTO-ENTMCNC: 32.7 G/DL (ref 32–36)
MCV RBC AUTO: 95 FL (ref 80–100)
NRBC BLD-RTO: 0 /100 WBCS (ref 0–0)
PLATELET # BLD AUTO: 115 X10*3/UL (ref 150–450)
POTASSIUM SERPL-SCNC: 3.2 MMOL/L (ref 3.5–5.3)
PROT SERPL-MCNC: 4.9 G/DL (ref 6.4–8.2)
RBC # BLD AUTO: 3.11 X10*6/UL (ref 4–5.2)
SODIUM SERPL-SCNC: 141 MMOL/L (ref 136–145)
WBC # BLD AUTO: 14.7 X10*3/UL (ref 4.4–11.3)

## 2024-02-22 PROCEDURE — 2500000004 HC RX 250 GENERAL PHARMACY W/ HCPCS (ALT 636 FOR OP/ED): Performed by: HOSPITALIST

## 2024-02-22 PROCEDURE — 2500000001 HC RX 250 WO HCPCS SELF ADMINISTERED DRUGS (ALT 637 FOR MEDICARE OP): Performed by: HOSPITALIST

## 2024-02-22 PROCEDURE — 2500000004 HC RX 250 GENERAL PHARMACY W/ HCPCS (ALT 636 FOR OP/ED): Performed by: SURGERY

## 2024-02-22 PROCEDURE — 94762 N-INVAS EAR/PLS OXIMTRY CONT: CPT

## 2024-02-22 PROCEDURE — 36415 COLL VENOUS BLD VENIPUNCTURE: CPT | Performed by: SURGERY

## 2024-02-22 PROCEDURE — 82947 ASSAY GLUCOSE BLOOD QUANT: CPT

## 2024-02-22 PROCEDURE — 99233 SBSQ HOSP IP/OBS HIGH 50: CPT | Performed by: SURGERY

## 2024-02-22 PROCEDURE — 2500000004 HC RX 250 GENERAL PHARMACY W/ HCPCS (ALT 636 FOR OP/ED): Performed by: INTERNAL MEDICINE

## 2024-02-22 PROCEDURE — 99291 CRITICAL CARE FIRST HOUR: CPT | Performed by: HOSPITALIST

## 2024-02-22 PROCEDURE — 80053 COMPREHEN METABOLIC PANEL: CPT | Performed by: STUDENT IN AN ORGANIZED HEALTH CARE EDUCATION/TRAINING PROGRAM

## 2024-02-22 PROCEDURE — 36415 COLL VENOUS BLD VENIPUNCTURE: CPT | Performed by: STUDENT IN AN ORGANIZED HEALTH CARE EDUCATION/TRAINING PROGRAM

## 2024-02-22 PROCEDURE — 85027 COMPLETE CBC AUTOMATED: CPT | Performed by: SURGERY

## 2024-02-22 PROCEDURE — 2020000001 HC ICU ROOM DAILY

## 2024-02-22 RX ORDER — HYDROMORPHONE HYDROCHLORIDE 1 MG/ML
1 INJECTION, SOLUTION INTRAMUSCULAR; INTRAVENOUS; SUBCUTANEOUS ONCE
Status: COMPLETED | OUTPATIENT
Start: 2024-02-22 | End: 2024-02-22

## 2024-02-22 RX ORDER — POTASSIUM CHLORIDE 14.9 MG/ML
20 INJECTION INTRAVENOUS ONCE
Status: COMPLETED | OUTPATIENT
Start: 2024-02-22 | End: 2024-02-22

## 2024-02-22 RX ADMIN — MEROPENEM 1 G: 1 INJECTION, POWDER, FOR SOLUTION INTRAVENOUS at 17:58

## 2024-02-22 RX ADMIN — POTASSIUM CHLORIDE 20 MEQ: 14.9 INJECTION, SOLUTION INTRAVENOUS at 11:38

## 2024-02-22 RX ADMIN — MEROPENEM 1 G: 1 INJECTION, POWDER, FOR SOLUTION INTRAVENOUS at 10:56

## 2024-02-22 RX ADMIN — HYDROMORPHONE HYDROCHLORIDE 2 MG: 2 INJECTION, SOLUTION INTRAMUSCULAR; INTRAVENOUS; SUBCUTANEOUS at 11:36

## 2024-02-22 RX ADMIN — SODIUM CHLORIDE, POTASSIUM CHLORIDE, SODIUM LACTATE AND CALCIUM CHLORIDE 75 ML/HR: 600; 310; 30; 20 INJECTION, SOLUTION INTRAVENOUS at 23:08

## 2024-02-22 RX ADMIN — HYDROMORPHONE HYDROCHLORIDE 2 MG: 2 INJECTION, SOLUTION INTRAMUSCULAR; INTRAVENOUS; SUBCUTANEOUS at 15:54

## 2024-02-22 RX ADMIN — FAMOTIDINE 20 MG: 10 INJECTION, SOLUTION INTRAVENOUS at 08:02

## 2024-02-22 RX ADMIN — POLYVINYL ALCOHOL, POVIDONE 1 DROP: 14; 6 SOLUTION/ DROPS OPHTHALMIC at 08:02

## 2024-02-22 RX ADMIN — SODIUM CHLORIDE, POTASSIUM CHLORIDE, SODIUM LACTATE AND CALCIUM CHLORIDE 125 ML/HR: 600; 310; 30; 20 INJECTION, SOLUTION INTRAVENOUS at 11:35

## 2024-02-22 RX ADMIN — NYSTATIN 1 APPLICATION: 100000 POWDER TOPICAL at 08:02

## 2024-02-22 RX ADMIN — PIPERACILLIN SODIUM AND TAZOBACTAM SODIUM 3.38 G: 3; .375 INJECTION, SOLUTION INTRAVENOUS at 08:02

## 2024-02-22 RX ADMIN — PIPERACILLIN SODIUM AND TAZOBACTAM SODIUM 3.38 G: 3; .375 INJECTION, SOLUTION INTRAVENOUS at 02:17

## 2024-02-22 RX ADMIN — SODIUM CHLORIDE, POTASSIUM CHLORIDE, SODIUM LACTATE AND CALCIUM CHLORIDE 125 ML/HR: 600; 310; 30; 20 INJECTION, SOLUTION INTRAVENOUS at 03:25

## 2024-02-22 RX ADMIN — HYDROMORPHONE HYDROCHLORIDE 1 MG: 1 INJECTION, SOLUTION INTRAMUSCULAR; INTRAVENOUS; SUBCUTANEOUS at 18:34

## 2024-02-22 RX ADMIN — HYDROMORPHONE HYDROCHLORIDE 2 MG: 2 INJECTION, SOLUTION INTRAMUSCULAR; INTRAVENOUS; SUBCUTANEOUS at 07:48

## 2024-02-22 ASSESSMENT — COGNITIVE AND FUNCTIONAL STATUS - GENERAL
TURNING FROM BACK TO SIDE WHILE IN FLAT BAD: TOTAL
PERSONAL GROOMING: TOTAL
MOVING FROM LYING ON BACK TO SITTING ON SIDE OF FLAT BED WITH BEDRAILS: A LOT
WALKING IN HOSPITAL ROOM: TOTAL
PERSONAL GROOMING: TOTAL
STANDING UP FROM CHAIR USING ARMS: TOTAL
DAILY ACTIVITIY SCORE: 6
CLIMB 3 TO 5 STEPS WITH RAILING: TOTAL
EATING MEALS: TOTAL
CLIMB 3 TO 5 STEPS WITH RAILING: TOTAL
MOVING TO AND FROM BED TO CHAIR: TOTAL
TOILETING: TOTAL
DRESSING REGULAR UPPER BODY CLOTHING: TOTAL
HELP NEEDED FOR BATHING: TOTAL
EATING MEALS: TOTAL
DRESSING REGULAR LOWER BODY CLOTHING: TOTAL
MOBILITY SCORE: 7
MOBILITY SCORE: 7
HELP NEEDED FOR BATHING: TOTAL
WALKING IN HOSPITAL ROOM: TOTAL
TURNING FROM BACK TO SIDE WHILE IN FLAT BAD: TOTAL
DRESSING REGULAR LOWER BODY CLOTHING: TOTAL
DAILY ACTIVITIY SCORE: 6
DRESSING REGULAR UPPER BODY CLOTHING: TOTAL
MOVING TO AND FROM BED TO CHAIR: TOTAL
STANDING UP FROM CHAIR USING ARMS: TOTAL
MOVING FROM LYING ON BACK TO SITTING ON SIDE OF FLAT BED WITH BEDRAILS: A LOT
TOILETING: TOTAL

## 2024-02-22 ASSESSMENT — PAIN SCALES - WONG BAKER
WONGBAKER_NUMERICALRESPONSE: HURTS LITTLE BIT
WONGBAKER_NUMERICALRESPONSE: HURTS WORST
WONGBAKER_NUMERICALRESPONSE: HURTS WHOLE LOT
WONGBAKER_NUMERICALRESPONSE: HURTS LITTLE BIT
WONGBAKER_NUMERICALRESPONSE: HURTS LITTLE BIT
WONGBAKER_NUMERICALRESPONSE: HURTS WORST
WONGBAKER_NUMERICALRESPONSE: HURTS LITTLE BIT

## 2024-02-22 ASSESSMENT — PAIN SCALES - PAIN ASSESSMENT IN ADVANCED DEMENTIA (PAINAD)
BODYLANGUAGE: RELAXED
NEGVOCALIZATION: REPEATED TROUBLED CALLING OUT, LOUD MOANING/GROANING, CRYING
TOTALSCORE: 3
BREATHING: NORMAL
FACIALEXPRESSION: SMILING OR INEXPRESSIVE
CONSOLABILITY: UNABLE TO CONSOLE, DISTRACT OR REASSURE
NEGVOCALIZATION: OCCASIONAL MOAN/GROAN, LOW SPEECH, NEGATIVE/DISAPPROVING QUALITY
FACIALEXPRESSION: SMILING OR INEXPRESSIVE
BREATHING: NORMAL
TOTALSCORE: 4
CONSOLABILITY: UNABLE TO CONSOLE, DISTRACT OR REASSURE
BODYLANGUAGE: RELAXED

## 2024-02-22 ASSESSMENT — PAIN - FUNCTIONAL ASSESSMENT
PAIN_FUNCTIONAL_ASSESSMENT: CPOT (CRITICAL CARE PAIN OBSERVATION TOOL)
PAIN_FUNCTIONAL_ASSESSMENT: WONG-BAKER FACES
PAIN_FUNCTIONAL_ASSESSMENT: CPOT (CRITICAL CARE PAIN OBSERVATION TOOL)
PAIN_FUNCTIONAL_ASSESSMENT: PAINAD (PAIN ASSESSMENT IN ADVANCED DEMENTIA SCALE)
PAIN_FUNCTIONAL_ASSESSMENT: CPOT (CRITICAL CARE PAIN OBSERVATION TOOL)
PAIN_FUNCTIONAL_ASSESSMENT: PAINAD (PAIN ASSESSMENT IN ADVANCED DEMENTIA SCALE)
PAIN_FUNCTIONAL_ASSESSMENT: WONG-BAKER FACES
PAIN_FUNCTIONAL_ASSESSMENT: CPOT (CRITICAL CARE PAIN OBSERVATION TOOL)
PAIN_FUNCTIONAL_ASSESSMENT: WONG-BAKER FACES

## 2024-02-22 NOTE — PROGRESS NOTES
Occupational Therapy                 Therapy Communication Note    Patient Name: Hannah Pedraza  MRN: [id number]  Today's Date: 2/22/2024     Discipline: Occupational Therapy    Missed Visit Reason: Missed Visit Reason: Other (Comment) (spoke to nurse who advises to hold pt for OT evaluation today due to increased pain and decreased level of arousal after pain meds.)

## 2024-02-22 NOTE — PROGRESS NOTES
General Surgery Afternoon Rounds    Patient still with moaning/decreased mental status. Remains afebrile. No tachycardia today (HR 83 this evening on exam), and no hypotension. Amount of bile from drain removal site has decreased throughout the day. She is scheduled for Cystoscopy/stent placement tomorrow with Dr. Garcia to address the left renal stone/possible pyelonephritis. If no improvement following that, may still need to reconsider repeat attempt at cholecystostomy tube. Recommend continuing to hold anticoagulation.     Kindra Balderas MD  02/22/24  6:29 PM

## 2024-02-22 NOTE — CARE PLAN
Problem: Skin  Goal: Participates in plan/prevention/treatment measures  Outcome: Progressing  Goal: Prevent/manage excess moisture  Outcome: Progressing  Goal: Prevent/minimize sheer/friction injuries  Outcome: Progressing  Goal: Promote/optimize nutrition  Outcome: Progressing  Goal: Promote skin healing  Outcome: Progressing     Problem: Infection prevention/bleeding  Goal: Infection s/sx managed  Outcome: Progressing  Goal: No further progression of infection  Outcome: Progressing     Problem: Perfusion/Cardiac  Goal: Adequate perfusion to organs/extremities  Outcome: Progressing  Goal: Hemodynamically stable  Outcome: Progressing  Goal: No cardiac arrhythmias  Outcome: Progressing     Problem: Respiratory/Oxygenation  Goal: No signs of respiratory compromise  Outcome: Progressing  Goal: Tolerates activity without increased O2 demands  Outcome: Progressing     Problem: Neuro/Coping  Goal: Minimal anxiety; utilize coping mechanisms  Outcome: Progressing  Goal: No signs of neurological compromise  Outcome: Progressing  Goal: Increase self care/family involvement  Outcome: Progressing     Problem: Fluid/Electrolyte/Nutrition  Goal: Fluid balance within 1 liter of normovolemia  Outcome: Progressing  Goal: No signs of renal failure  Outcome: Progressing  Goal: Normal electrolyte levels  Outcome: Progressing  Goal: Normal glucose levels  Outcome: Progressing  Goal: Tolerates nutritional intake  Outcome: Progressing     Problem: Pain - Adult  Goal: Verbalizes/displays adequate comfort level or baseline comfort level  Outcome: Progressing     Problem: Safety - Adult  Goal: Free from fall injury  Outcome: Progressing     Problem: Discharge Planning  Goal: Discharge to home or other facility with appropriate resources  Outcome: Progressing     Problem: Chronic Conditions and Co-morbidities  Goal: Patient's chronic conditions and co-morbidity symptoms are monitored and maintained or improved  Outcome: Progressing      Problem: Diabetes  Goal: Achieve decreasing blood glucose levels by end of shift  Outcome: Progressing  Goal: Increase stability of blood glucose readings by end of shift  Outcome: Progressing  Goal: Decrease in ketones present in urine by end of shift  Outcome: Progressing  Goal: Maintain electrolyte levels within acceptable range throughout shift  Outcome: Progressing  Goal: Maintain glucose levels >70mg/dl to <250mg/dl throughout shift  Outcome: Progressing  Goal: No changes in neurological exam by end of shift  Outcome: Progressing  Goal: Learn about and adhere to nutrition recommendations by end of shift  Outcome: Progressing  Goal: Vital signs within normal range for age by end of shift  Outcome: Progressing  Goal: Increase self care and/or family involovement by end of shift  Outcome: Progressing  Goal: Receive DSME education by end of shift  Outcome: Progressing     Problem: Pain  Goal: Takes deep breaths with improved pain control throughout the shift  Outcome: Progressing  Goal: Turns in bed with improved pain control throughout the shift  Outcome: Progressing  Goal: Free from opioid side effects throughout the shift  Outcome: Progressing  Goal: Free from acute confusion related to pain meds throughout the shift  Outcome: Progressing   The patient's goals for the shift include      The clinical goals for the shift include pain control    Pt lethargic this shift. Groans and moans occasionally and when repositioning pt. RUQ post op drain site draining large amounts of yellow/green drainage.

## 2024-02-22 NOTE — PROGRESS NOTES
"General Surgery Progress Note    Patient has more confusion overnight/this morning.  She continues to intermittently moan, but unable to verbalize where her pain is located or if she is in pain.  On exam, she is tender on both the left flank as well as the right side of her abdomen.  She has a good amount of bile draining from her drain removal site in the right upper quadrant of the abdomen, which is required multiple dressing changes. She remains afebrile.  Her leukocytosis continues to decrease.    /69 (BP Location: Left arm, Patient Position: Lying)   Pulse 83   Temp 36.4 °C (97.5 °F) (Temporal)   Resp 17   Ht 1.499 m (4' 11\")   Wt 98.4 kg (216 lb 14.9 oz)   SpO2 96%   BMI 43.82 kg/m²   NAD, laying supine in bed, she very quickly withdraws from pain and quickly opens eyes on sternal rub but will not open eyes to verbal command  No labored breathing, on supplemental oxygen  NSR, heart rate in low 90s  Abdomen soft, morbidly obese, tender to palpation in the left flank, tender to palpation in the right side of her abdomen, nontender in the lower abdomen, no generalized peritonitis, cholecystostomy drain removal site with noninfected appearing bile leaking from the site    Intake/Output Summary (Last 24 hours) at 2/22/2024 1216  Last data filed at 2/22/2024 1200  Gross per 24 hour   Intake 810.42 ml   Output 1480 ml   Net -669.58 ml     WBC 14.7 (down from 20, from 27), Hgb 9.6 from 11.2  Tbili 1.9    Patient is a 68 y.o. female admitted with SIRS, multiple potential etiologies of her infection including UTI and cholecystitis.  Attempt was made at cholecystostomy tube placement, but this coiled within the liver after initially puncturing the gallbladder.  I suspect that the bile is coming from where the gallbladder was previously punctured, in which case it is decompression.  I anticipate that this will eventually heal spontaneously without further intervention.  We will therefore take an observational " approach, particularly with it not accumulating intra-abdominally.  Will need to try to protect the surrounding skin at the site of the bile leakage, as bile can be very irritating to the skin.  A urostomy bag serving as a wound protector would be 1 option, or frequently changing a small focal piece of gauze. Will continue IV antibiotics and keep her NPO.  I recommend holding therapeutic anticoagulation today given slight drop in her hemoglobin.  She needs to be wearing SCDs at all times.     Kindra Balderas MD  02/22/24  12:22 PM

## 2024-02-22 NOTE — PROGRESS NOTES
Hannah Pedraza is a 68 y.o. female on day 3 of admission presenting with Septic shock (CMS/HCC).      Subjective   Patient seen in room, resting comfortably, just got pain shot.   in room.  Continues to have abdominal pain.  No nausea, vomiting or diarrhea.  No fevers.  Per nursing no other acute events through the night.  She has been draining at the cholecystectomy tube site         Objective     Last Recorded Vitals  /82 (BP Location: Left arm, Patient Position: Lying)   Pulse 92   Temp 36.6 °C (97.9 °F) (Temporal)   Resp 22   Wt 100 kg (220 lb 7.4 oz)   SpO2 97%   Intake/Output last 3 Shifts:    Intake/Output Summary (Last 24 hours) at 2/22/2024 0948  Last data filed at 2/22/2024 0500  Gross per 24 hour   Intake 710.42 ml   Output 1130 ml   Net -419.58 ml       Admission Weight  Weight: 84.4 kg (186 lb 1.6 oz) (02/19/24 0525)    Daily Weight  02/22/24 : 100 kg (220 lb 7.4 oz)    Image Results  NM hepatobiliary, NM Spect/CT Localization add on Single Day  Narrative: Interpreted By:  Curt Patten,   STUDY:  NM HEPATOBILIARY;  2/21/2024 1:24 pm      INDICATION:  Signs/Symptoms:Assess for cholecystitis.      COMPARISON:  CT of abdomen on 02/20/2024      ACCESSION NUMBER(S):  [id number]      ORDERING CLINICIAN:  FACUNDO NORIEGA      TECHNIQUE:  DIVISION OF NUCLEAR MEDICINE  HEPATOBILIARY SCAN (HIDA)      The patient received an intravenous dose of 5.7 mCi of Tc-99m  mebrofenin (Choletec).  Sequential images of the upper abdomen were  then acquired over the next 60 minutes. SPECT CT of abdomen was  obtained as well.      FINDINGS:  There is prompt accumulation of activity within the liver and normal  subsequent excretion via the biliary ductal system into the small  bowel.  The gallbladder was not visualized by 1 hour after  radiotracer injection, suggestive of cystic duct obstruction and  acute cholecystitis. Additionally, linear-shaped radiotracer activity  from the hepatic dome to the level of L4  inferiorly along the  anterior abdominal wall, suggestive of a bile leak      Impression: 1. Gallbladder was not visualized by 1 hour after radiotracer  ejection on both planar and SPECT CT, suggestive of cystic duct  obstruction and acute cholecystitis.  2. Linear-shaped radiotracer activity along the anterior abdominal  wall from the hepatic dome to the level of L4 inferiorly, suggestive  of a bile leak . Imaging was saved into the PACS for review      Critical Finding:  See findings. Notification was initiated on  2/21/2024 at 2:16 pm by Dr Curt Patten to Dr. Kindra Balderas.  (**-OCF-**) I personally reviewed the images/study and I agree with  the findings as stated. This study was interpreted at [place], [place].      MACRO:  None      Signed by: Curt Patten 2/21/2024 2:19 PM  Dictation workstation:   [id number]      Physical Exam  Gen: NAD, A&O x 3, Well developed  HEENT: Normal size, shape; no masses noted in neck, no lymphadenopathy, no tracheal deviation, non-palpable thyroid  Neck: Supple.Trachea central.No neck rigidity.  Chest: chest symmetry with respirations, no wheezes, crackles  CVS: RRR, no rubs  Abd: Distended, soft, generalized tenderness, no guarding, rigidity, rebound tenderness.  Bowel sounds present.    Ext: no Clubbing/Cyanosis/edema, non-tender.Pulse 2+. Homans Negative.  Skin: Dry, warm, good condition  Neuro: grossly normal  Psy: Mood and affect appropriate    Assessment/Plan   This patient currently has cardiac telemetry ordered; if you would like to modify or discontinue the telemetry order, click here to go to the orders activity to modify/discontinue the order.    Principal Problem:    Septic shock (CMS/HCC)  UTI, urine cultures with enteric bacilli and gram-negative bacilli  Klebsiella pneumonia  bacteremia, new  Left ureteral stone  Acute cholecystitis, s/p cholecystectomy tube  PAOLA  Lactic acidosis  Metabolic encephalopathy  History of  hypertension  Hyperlipidemia  GERD  DVT on Eliquis  Left renal stone  Degenerative disc disease  Diabetes mellitus type 2  History of possible CHF         Plan     Discussed with Dr. Garcia, urology, will make her n.p.o., stent placement in AM.  Reviewed HIDA scan, acute cholecystitis.  Continue to be n.p.o., IV fluids, IV pain medications.  Sepsis resolved.  Off Levophed.  Switched to IV meropenem, stop Zosyn because of the blood cultures positive for Klebsiella pneumonia.    Reviewed echo, LVEF 60%.  Strict ins and outs.  Daily weights.  Appreciate general surgery input.  Hold anticoagulation secondary due to possible gallbladder tube placement.  Recheck labs.  Continue with insulin.     CODE STATUS full.  DVT prophylaxis on heparin.  Disposition to be decided.     Total critical care time 35 minutes.                         Rtea Lea MD

## 2024-02-22 NOTE — CARE PLAN
The patient's goals for the shift include      The clinical goals for the shift include pain control, prevent skin breakdown    Pt remains uncomfortable intermittently and minimally responsive

## 2024-02-22 NOTE — PROGRESS NOTES
Per medical team, patient is not yet medically appropriate for discharge today; Patient awaits a procedure with Dr. Garcia tomorrow. Per [facility], [facility] was hoping to submit for insurance auth for patient to return [place] [facility] [place] care rather than long-term under patient's medicaid. However, patient has not been appropriate to participate with PT/OT ron, and SW updated [facility] re: same, and that patient will most likely need to return to [facility] [place] for [place] under patient's Medicaid. SW to continue to send updated notes to [facility] via [facility] as notes available. Plan for patient to return to [facility] for long-term care when medically ready. Care Transitions to follow and assist. CHAYO Morales

## 2024-02-22 NOTE — PROGRESS NOTES
Physical Therapy                 Therapy Communication Note    Patient Name: Hannah Pedraza  MRN: [id number]  Today's Date: 2/22/2024     Discipline: Physical Therapy    Missed Visit Reason: Missed Visit Reason: Other (Comment) (patient not alert per nurse, stating patient with more pain today and decreased level of arousal after pain medicaiton   Patient is from long term care facilitay and uses wheelchair at [facility].  HOLD eval)    Missed Time: Attempt    Comment:

## 2024-02-23 ENCOUNTER — APPOINTMENT (OUTPATIENT)
Dept: RADIOLOGY | Facility: HOSPITAL | Age: 69
DRG: 853 | End: 2024-02-23
Payer: COMMERCIAL

## 2024-02-23 ENCOUNTER — ANESTHESIA (OUTPATIENT)
Dept: OPERATING ROOM | Facility: HOSPITAL | Age: 69
DRG: 853 | End: 2024-02-23
Payer: COMMERCIAL

## 2024-02-23 ENCOUNTER — ANESTHESIA EVENT (OUTPATIENT)
Dept: OPERATING ROOM | Facility: HOSPITAL | Age: 69
DRG: 853 | End: 2024-02-23
Payer: COMMERCIAL

## 2024-02-23 LAB
ALBUMIN SERPL BCP-MCNC: 2.2 G/DL (ref 3.4–5)
ALP SERPL-CCNC: 84 U/L (ref 33–136)
ALT SERPL W P-5'-P-CCNC: 12 U/L (ref 7–45)
AMMONIA PLAS-SCNC: 30 UMOL/L (ref 16–53)
ANION GAP SERPL CALC-SCNC: 9 MMOL/L (ref 10–20)
ARTERIAL PATENCY WRIST A: POSITIVE
AST SERPL W P-5'-P-CCNC: 15 U/L (ref 9–39)
BASE EXCESS BLDA CALC-SCNC: 1.7 MMOL/L (ref -2–3)
BILIRUB SERPL-MCNC: 1.9 MG/DL (ref 0–1.2)
BODY TEMPERATURE: 37 DEGREES CELSIUS
BUN SERPL-MCNC: 17 MG/DL (ref 6–23)
CALCIUM SERPL-MCNC: 7.8 MG/DL (ref 8.6–10.3)
CHLORIDE SERPL-SCNC: 113 MMOL/L (ref 98–107)
CO2 SERPL-SCNC: 24 MMOL/L (ref 21–32)
CREAT SERPL-MCNC: 0.43 MG/DL (ref 0.5–1.05)
EGFRCR SERPLBLD CKD-EPI 2021: >90 ML/MIN/1.73M*2
ERYTHROCYTE [DISTWIDTH] IN BLOOD BY AUTOMATED COUNT: 13.6 % (ref 11.5–14.5)
GLUCOSE BLD MANUAL STRIP-MCNC: 132 MG/DL (ref 74–99)
GLUCOSE BLD MANUAL STRIP-MCNC: 142 MG/DL (ref 74–99)
GLUCOSE BLD MANUAL STRIP-MCNC: 90 MG/DL (ref 74–99)
GLUCOSE SERPL-MCNC: 96 MG/DL (ref 74–99)
HCO3 BLDA-SCNC: 23.7 MMOL/L (ref 22–26)
HCT VFR BLD AUTO: 28.3 % (ref 36–46)
HGB BLD-MCNC: 9.2 G/DL (ref 12–16)
HOLD SPECIMEN: NORMAL
HOLD SPECIMEN: NORMAL
INHALED O2 CONCENTRATION: 28 %
LACTATE SERPL-SCNC: 0.7 MMOL/L (ref 0.4–2)
LIPASE SERPL-CCNC: 16 U/L (ref 9–82)
MAGNESIUM SERPL-MCNC: 1.47 MG/DL (ref 1.6–2.4)
MCH RBC QN AUTO: 30.7 PG (ref 26–34)
MCHC RBC AUTO-ENTMCNC: 32.5 G/DL (ref 32–36)
MCV RBC AUTO: 94 FL (ref 80–100)
NRBC BLD-RTO: 0 /100 WBCS (ref 0–0)
OXYHGB MFR BLDA: 93 % (ref 94–98)
PCO2 BLDA: 29 MM HG (ref 38–42)
PH BLDA: 7.52 PH (ref 7.38–7.42)
PLATELET # BLD AUTO: 127 X10*3/UL (ref 150–450)
PO2 BLDA: 65 MM HG (ref 85–95)
POTASSIUM SERPL-SCNC: 3 MMOL/L (ref 3.5–5.3)
PROT SERPL-MCNC: 4.6 G/DL (ref 6.4–8.2)
RBC # BLD AUTO: 3 X10*6/UL (ref 4–5.2)
SAO2 % BLDA: 96 % (ref 94–100)
SODIUM SERPL-SCNC: 143 MMOL/L (ref 136–145)
SPECIMEN DRAWN FROM PATIENT: ABNORMAL
T4 FREE SERPL-MCNC: 0.88 NG/DL (ref 0.61–1.12)
TSH SERPL-ACNC: 0.56 MIU/L (ref 0.44–3.98)
WBC # BLD AUTO: 13.7 X10*3/UL (ref 4.4–11.3)

## 2024-02-23 PROCEDURE — 2500000004 HC RX 250 GENERAL PHARMACY W/ HCPCS (ALT 636 FOR OP/ED): Performed by: ANESTHESIOLOGY

## 2024-02-23 PROCEDURE — 36415 COLL VENOUS BLD VENIPUNCTURE: CPT | Performed by: SURGERY

## 2024-02-23 PROCEDURE — 36415 COLL VENOUS BLD VENIPUNCTURE: CPT | Performed by: STUDENT IN AN ORGANIZED HEALTH CARE EDUCATION/TRAINING PROGRAM

## 2024-02-23 PROCEDURE — 82805 BLOOD GASES W/O2 SATURATION: CPT | Performed by: HOSPITALIST

## 2024-02-23 PROCEDURE — 2720000007 HC OR 272 NO HCPCS

## 2024-02-23 PROCEDURE — 83735 ASSAY OF MAGNESIUM: CPT | Performed by: STUDENT IN AN ORGANIZED HEALTH CARE EDUCATION/TRAINING PROGRAM

## 2024-02-23 PROCEDURE — 85027 COMPLETE CBC AUTOMATED: CPT | Performed by: STUDENT IN AN ORGANIZED HEALTH CARE EDUCATION/TRAINING PROGRAM

## 2024-02-23 PROCEDURE — 3600000008 HC OR TIME - EACH INCREMENTAL 1 MINUTE - PROCEDURE LEVEL THREE: Performed by: UROLOGY

## 2024-02-23 PROCEDURE — 2500000004 HC RX 250 GENERAL PHARMACY W/ HCPCS (ALT 636 FOR OP/ED): Performed by: STUDENT IN AN ORGANIZED HEALTH CARE EDUCATION/TRAINING PROGRAM

## 2024-02-23 PROCEDURE — C2617 STENT, NON-COR, TEM W/O DEL: HCPCS | Performed by: UROLOGY

## 2024-02-23 PROCEDURE — 3700000001 HC GENERAL ANESTHESIA TIME - INITIAL BASE CHARGE: Performed by: UROLOGY

## 2024-02-23 PROCEDURE — 99223 1ST HOSP IP/OBS HIGH 75: CPT | Performed by: INTERNAL MEDICINE

## 2024-02-23 PROCEDURE — 84443 ASSAY THYROID STIM HORMONE: CPT | Performed by: STUDENT IN AN ORGANIZED HEALTH CARE EDUCATION/TRAINING PROGRAM

## 2024-02-23 PROCEDURE — 80053 COMPREHEN METABOLIC PANEL: CPT | Performed by: SURGERY

## 2024-02-23 PROCEDURE — 99233 SBSQ HOSP IP/OBS HIGH 50: CPT | Performed by: SURGERY

## 2024-02-23 PROCEDURE — 87186 SC STD MICRODIL/AGAR DIL: CPT | Mod: SAMLAB | Performed by: SURGERY

## 2024-02-23 PROCEDURE — 74420 UROGRAPHY RTRGR +-KUB: CPT | Performed by: UROLOGY

## 2024-02-23 PROCEDURE — 2500000004 HC RX 250 GENERAL PHARMACY W/ HCPCS (ALT 636 FOR OP/ED): Performed by: HOSPITALIST

## 2024-02-23 PROCEDURE — 2500000004 HC RX 250 GENERAL PHARMACY W/ HCPCS (ALT 636 FOR OP/ED): Performed by: SURGERY

## 2024-02-23 PROCEDURE — 3700000002 HC GENERAL ANESTHESIA TIME - EACH INCREMENTAL 1 MINUTE: Performed by: UROLOGY

## 2024-02-23 PROCEDURE — 82947 ASSAY GLUCOSE BLOOD QUANT: CPT

## 2024-02-23 PROCEDURE — 2500000004 HC RX 250 GENERAL PHARMACY W/ HCPCS (ALT 636 FOR OP/ED): Performed by: INTERNAL MEDICINE

## 2024-02-23 PROCEDURE — 2500000004 HC RX 250 GENERAL PHARMACY W/ HCPCS (ALT 636 FOR OP/ED)

## 2024-02-23 PROCEDURE — BT17YZZ FLUOROSCOPY OF LEFT URETER USING OTHER CONTRAST: ICD-10-PCS | Performed by: UROLOGY

## 2024-02-23 PROCEDURE — C1729 CATH, DRAINAGE: HCPCS

## 2024-02-23 PROCEDURE — 83605 ASSAY OF LACTIC ACID: CPT | Performed by: STUDENT IN AN ORGANIZED HEALTH CARE EDUCATION/TRAINING PROGRAM

## 2024-02-23 PROCEDURE — C1769 GUIDE WIRE: HCPCS | Performed by: UROLOGY

## 2024-02-23 PROCEDURE — 2500000005 HC RX 250 GENERAL PHARMACY W/O HCPCS: Performed by: RADIOLOGY

## 2024-02-23 PROCEDURE — 99233 SBSQ HOSP IP/OBS HIGH 50: CPT | Performed by: STUDENT IN AN ORGANIZED HEALTH CARE EDUCATION/TRAINING PROGRAM

## 2024-02-23 PROCEDURE — 0T778ZZ DILATION OF LEFT URETER, VIA NATURAL OR ARTIFICIAL OPENING ENDOSCOPIC: ICD-10-PCS | Performed by: UROLOGY

## 2024-02-23 PROCEDURE — 82140 ASSAY OF AMMONIA: CPT | Performed by: STUDENT IN AN ORGANIZED HEALTH CARE EDUCATION/TRAINING PROGRAM

## 2024-02-23 PROCEDURE — 2500000001 HC RX 250 WO HCPCS SELF ADMINISTERED DRUGS (ALT 637 FOR MEDICARE OP): Performed by: HOSPITALIST

## 2024-02-23 PROCEDURE — 36600 WITHDRAWAL OF ARTERIAL BLOOD: CPT

## 2024-02-23 PROCEDURE — 47490 INCISION OF GALLBLADDER: CPT | Performed by: RADIOLOGY

## 2024-02-23 PROCEDURE — 52356 CYSTO/URETERO W/LITHOTRIPSY: CPT | Performed by: UROLOGY

## 2024-02-23 PROCEDURE — 3600000003 HC OR TIME - INITIAL BASE CHARGE - PROCEDURE LEVEL THREE: Performed by: UROLOGY

## 2024-02-23 PROCEDURE — 2720000007 HC OR 272 NO HCPCS: Performed by: UROLOGY

## 2024-02-23 PROCEDURE — 2020000001 HC ICU ROOM DAILY

## 2024-02-23 PROCEDURE — 2500000001 HC RX 250 WO HCPCS SELF ADMINISTERED DRUGS (ALT 637 FOR MEDICARE OP): Performed by: STUDENT IN AN ORGANIZED HEALTH CARE EDUCATION/TRAINING PROGRAM

## 2024-02-23 PROCEDURE — 7100000001 HC RECOVERY ROOM TIME - INITIAL BASE CHARGE: Performed by: UROLOGY

## 2024-02-23 PROCEDURE — 83690 ASSAY OF LIPASE: CPT | Performed by: STUDENT IN AN ORGANIZED HEALTH CARE EDUCATION/TRAINING PROGRAM

## 2024-02-23 PROCEDURE — 2550000001 HC RX 255 CONTRASTS: Performed by: UROLOGY

## 2024-02-23 PROCEDURE — 84439 ASSAY OF FREE THYROXINE: CPT | Performed by: STUDENT IN AN ORGANIZED HEALTH CARE EDUCATION/TRAINING PROGRAM

## 2024-02-23 PROCEDURE — 7100000002 HC RECOVERY ROOM TIME - EACH INCREMENTAL 1 MINUTE: Performed by: UROLOGY

## 2024-02-23 PROCEDURE — 49405 IMAGE CATH FLUID COLXN VISC: CPT

## 2024-02-23 DEVICE — STENT, POLARIS ULTRA  5FR 24CM, DISPOSABLE: Type: IMPLANTABLE DEVICE | Site: URETER | Status: FUNCTIONAL

## 2024-02-23 RX ORDER — MORPHINE SULFATE 4 MG/ML
2 INJECTION, SOLUTION INTRAMUSCULAR; INTRAVENOUS EVERY 5 MIN PRN
Status: DISCONTINUED | OUTPATIENT
Start: 2024-02-23 | End: 2024-02-23 | Stop reason: HOSPADM

## 2024-02-23 RX ORDER — PROPOFOL 10 MG/ML
INJECTION, EMULSION INTRAVENOUS AS NEEDED
Status: DISCONTINUED | OUTPATIENT
Start: 2024-02-23 | End: 2024-02-23

## 2024-02-23 RX ORDER — FENTANYL CITRATE-0.9 % NACL/PF 1100MCG/55
PATIENT CONTROLLED ANALGESIA SYRINGE INJECTION
Status: COMPLETED
Start: 2024-02-23 | End: 2024-02-23

## 2024-02-23 RX ORDER — LEVOFLOXACIN 5 MG/ML
750 INJECTION, SOLUTION INTRAVENOUS EVERY 24 HOURS
Status: DISCONTINUED | OUTPATIENT
Start: 2024-02-23 | End: 2024-02-24

## 2024-02-23 RX ORDER — ONDANSETRON HYDROCHLORIDE 2 MG/ML
4 INJECTION, SOLUTION INTRAVENOUS ONCE
Status: COMPLETED | OUTPATIENT
Start: 2024-02-23 | End: 2024-02-23

## 2024-02-23 RX ORDER — ACETAMINOPHEN 325 MG/1
650 TABLET ORAL EVERY 4 HOURS PRN
Status: DISCONTINUED | OUTPATIENT
Start: 2024-02-23 | End: 2024-02-26 | Stop reason: HOSPADM

## 2024-02-23 RX ORDER — LIDOCAINE HYDROCHLORIDE 20 MG/ML
INJECTION, SOLUTION EPIDURAL; INFILTRATION; INTRACAUDAL; PERINEURAL
Status: COMPLETED | OUTPATIENT
Start: 2024-02-23 | End: 2024-02-23

## 2024-02-23 RX ORDER — POTASSIUM CHLORIDE 20 MEQ/1
40 TABLET, EXTENDED RELEASE ORAL ONCE
Status: DISCONTINUED | OUTPATIENT
Start: 2024-02-23 | End: 2024-02-23

## 2024-02-23 RX ORDER — FENTANYL CITRATE 50 UG/ML
50 INJECTION, SOLUTION INTRAMUSCULAR; INTRAVENOUS
Status: DISCONTINUED | OUTPATIENT
Start: 2024-02-23 | End: 2024-02-23

## 2024-02-23 RX ORDER — POTASSIUM CHLORIDE 14.9 MG/ML
20 INJECTION INTRAVENOUS
Status: COMPLETED | OUTPATIENT
Start: 2024-02-23 | End: 2024-02-23

## 2024-02-23 RX ORDER — ACETAMINOPHEN 325 MG/1
975 TABLET ORAL ONCE
Status: DISCONTINUED | OUTPATIENT
Start: 2024-02-23 | End: 2024-02-23

## 2024-02-23 RX ORDER — HALOPERIDOL 5 MG/ML
2 INJECTION INTRAMUSCULAR ONCE
Status: COMPLETED | OUTPATIENT
Start: 2024-02-23 | End: 2024-02-23

## 2024-02-23 RX ORDER — OXYCODONE HYDROCHLORIDE 5 MG/1
5 TABLET ORAL EVERY 4 HOURS PRN
Status: DISCONTINUED | OUTPATIENT
Start: 2024-02-23 | End: 2024-02-23 | Stop reason: HOSPADM

## 2024-02-23 RX ORDER — FENTANYL CITRATE 50 UG/ML
50 INJECTION, SOLUTION INTRAMUSCULAR; INTRAVENOUS EVERY 30 MIN PRN
Status: DISCONTINUED | OUTPATIENT
Start: 2024-02-23 | End: 2024-02-26 | Stop reason: HOSPADM

## 2024-02-23 RX ORDER — NALOXONE HYDROCHLORIDE 0.4 MG/ML
0.2 INJECTION, SOLUTION INTRAMUSCULAR; INTRAVENOUS; SUBCUTANEOUS AS NEEDED
Status: DISCONTINUED | OUTPATIENT
Start: 2024-02-23 | End: 2024-02-26 | Stop reason: HOSPADM

## 2024-02-23 RX ORDER — DEXAMETHASONE SODIUM PHOSPHATE 10 MG/ML
8 INJECTION INTRAMUSCULAR; INTRAVENOUS ONCE
Status: COMPLETED | OUTPATIENT
Start: 2024-02-23 | End: 2024-02-23

## 2024-02-23 RX ORDER — FENTANYL CITRATE 50 UG/ML
INJECTION, SOLUTION INTRAMUSCULAR; INTRAVENOUS AS NEEDED
Status: DISCONTINUED | OUTPATIENT
Start: 2024-02-23 | End: 2024-02-23

## 2024-02-23 RX ORDER — HYDROMORPHONE HYDROCHLORIDE 1 MG/ML
1 INJECTION, SOLUTION INTRAMUSCULAR; INTRAVENOUS; SUBCUTANEOUS EVERY 4 HOURS PRN
Status: DISCONTINUED | OUTPATIENT
Start: 2024-02-23 | End: 2024-02-23

## 2024-02-23 RX ORDER — FENTANYL CITRATE-0.9 % NACL/PF 1100MCG/55
PATIENT CONTROLLED ANALGESIA SYRINGE INJECTION CONTINUOUS
Status: DISCONTINUED | OUTPATIENT
Start: 2024-02-23 | End: 2024-02-26 | Stop reason: HOSPADM

## 2024-02-23 RX ORDER — POTASSIUM CHLORIDE 14.9 MG/ML
20 INJECTION INTRAVENOUS
Status: DISCONTINUED | OUTPATIENT
Start: 2024-02-23 | End: 2024-02-23

## 2024-02-23 RX ORDER — LABETALOL HYDROCHLORIDE 5 MG/ML
5 INJECTION, SOLUTION INTRAVENOUS ONCE AS NEEDED
Status: DISCONTINUED | OUTPATIENT
Start: 2024-02-23 | End: 2024-02-23 | Stop reason: HOSPADM

## 2024-02-23 RX ORDER — FENTANYL CITRATE 50 UG/ML
INJECTION, SOLUTION INTRAMUSCULAR; INTRAVENOUS
Status: COMPLETED
Start: 2024-02-23 | End: 2024-02-23

## 2024-02-23 RX ORDER — POTASSIUM CHLORIDE 14.9 MG/ML
INJECTION INTRAVENOUS
Status: COMPLETED
Start: 2024-02-23 | End: 2024-02-23

## 2024-02-23 RX ORDER — MORPHINE SULFATE 4 MG/ML
4 INJECTION, SOLUTION INTRAMUSCULAR; INTRAVENOUS EVERY 5 MIN PRN
Status: DISCONTINUED | OUTPATIENT
Start: 2024-02-23 | End: 2024-02-23 | Stop reason: HOSPADM

## 2024-02-23 RX ORDER — ONDANSETRON HYDROCHLORIDE 2 MG/ML
4 INJECTION, SOLUTION INTRAVENOUS ONCE AS NEEDED
Status: DISCONTINUED | OUTPATIENT
Start: 2024-02-23 | End: 2024-02-23 | Stop reason: HOSPADM

## 2024-02-23 RX ORDER — MIDAZOLAM HYDROCHLORIDE 1 MG/ML
1 INJECTION INTRAMUSCULAR; INTRAVENOUS ONCE
Status: DISCONTINUED | OUTPATIENT
Start: 2024-02-23 | End: 2024-02-23

## 2024-02-23 RX ADMIN — HYDROMORPHONE HYDROCHLORIDE 2 MG: 2 INJECTION, SOLUTION INTRAMUSCULAR; INTRAVENOUS; SUBCUTANEOUS at 03:42

## 2024-02-23 RX ADMIN — HYDROMORPHONE HYDROCHLORIDE 2 MG: 2 INJECTION, SOLUTION INTRAMUSCULAR; INTRAVENOUS; SUBCUTANEOUS at 15:00

## 2024-02-23 RX ADMIN — FENTANYL CITRATE 50 MCG: 50 INJECTION, SOLUTION INTRAMUSCULAR; INTRAVENOUS at 20:49

## 2024-02-23 RX ADMIN — FENTANYL CITRATE: 0.05 INJECTION, SOLUTION INTRAMUSCULAR; INTRAVENOUS at 17:50

## 2024-02-23 RX ADMIN — MEROPENEM 1 G: 1 INJECTION, POWDER, FOR SOLUTION INTRAVENOUS at 01:20

## 2024-02-23 RX ADMIN — FENTANYL CITRATE 50 MCG: 50 INJECTION, SOLUTION INTRAMUSCULAR; INTRAVENOUS at 16:11

## 2024-02-23 RX ADMIN — NYSTATIN 1 APPLICATION: 100000 POWDER TOPICAL at 08:27

## 2024-02-23 RX ADMIN — PROPOFOL 20 MG: 10 INJECTION, EMULSION INTRAVENOUS at 11:26

## 2024-02-23 RX ADMIN — SODIUM CHLORIDE, POTASSIUM CHLORIDE, SODIUM LACTATE AND CALCIUM CHLORIDE 75 ML/HR: 600; 310; 30; 20 INJECTION, SOLUTION INTRAVENOUS at 15:01

## 2024-02-23 RX ADMIN — HALOPERIDOL LACTATE 2 MG: 5 INJECTION, SOLUTION INTRAMUSCULAR at 22:52

## 2024-02-23 RX ADMIN — NYSTATIN 1 APPLICATION: 100000 POWDER TOPICAL at 23:48

## 2024-02-23 RX ADMIN — FENTANYL CITRATE 50 MCG: 50 INJECTION, SOLUTION INTRAMUSCULAR; INTRAVENOUS at 21:57

## 2024-02-23 RX ADMIN — ONDANSETRON 4 MG: 2 INJECTION INTRAMUSCULAR; INTRAVENOUS at 10:54

## 2024-02-23 RX ADMIN — FENTANYL CITRATE 100 MCG: 50 INJECTION, SOLUTION INTRAMUSCULAR; INTRAVENOUS at 11:19

## 2024-02-23 RX ADMIN — POTASSIUM CHLORIDE 20 MEQ: 14.9 INJECTION, SOLUTION INTRAVENOUS at 16:12

## 2024-02-23 RX ADMIN — POLYVINYL ALCOHOL, POVIDONE 1 DROP: 14; 6 SOLUTION/ DROPS OPHTHALMIC at 09:00

## 2024-02-23 RX ADMIN — POTASSIUM CHLORIDE 20 MEQ: 14.9 INJECTION, SOLUTION INTRAVENOUS at 17:54

## 2024-02-23 RX ADMIN — PROPOFOL 20 MG: 10 INJECTION, EMULSION INTRAVENOUS at 11:19

## 2024-02-23 RX ADMIN — LEVOFLOXACIN 750 MG: 750 INJECTION, SOLUTION INTRAVENOUS at 10:14

## 2024-02-23 RX ADMIN — DEXAMETHASONE SODIUM PHOSPHATE 8 MG: 10 INJECTION INTRAMUSCULAR; INTRAVENOUS at 10:53

## 2024-02-23 RX ADMIN — LIDOCAINE HYDROCHLORIDE 5 ML: 20 INJECTION, SOLUTION EPIDURAL; INFILTRATION; INTRACAUDAL; PERINEURAL at 13:07

## 2024-02-23 RX ADMIN — HALOPERIDOL LACTATE 2 MG: 5 INJECTION, SOLUTION INTRAMUSCULAR at 23:31

## 2024-02-23 RX ADMIN — HYDROMORPHONE HYDROCHLORIDE 2 MG: 2 INJECTION, SOLUTION INTRAMUSCULAR; INTRAVENOUS; SUBCUTANEOUS at 10:10

## 2024-02-23 RX ADMIN — FAMOTIDINE 20 MG: 10 INJECTION, SOLUTION INTRAVENOUS at 08:27

## 2024-02-23 RX ADMIN — SODIUM CHLORIDE, POTASSIUM CHLORIDE, SODIUM LACTATE AND CALCIUM CHLORIDE 75 ML/HR: 600; 310; 30; 20 INJECTION, SOLUTION INTRAVENOUS at 23:31

## 2024-02-23 RX ADMIN — POTASSIUM CHLORIDE 20 MEQ: 14.9 INJECTION, SOLUTION INTRAVENOUS at 09:27

## 2024-02-23 ASSESSMENT — PAIN SCALES - GENERAL
PAINLEVEL_OUTOF10: 6
PAINLEVEL_OUTOF10: 1
PAINLEVEL_OUTOF10: 4
PAINLEVEL_OUTOF10: 5 - MODERATE PAIN
PAINLEVEL_OUTOF10: 1
PAINLEVEL_OUTOF10: 6
PAINLEVEL_OUTOF10: 5 - MODERATE PAIN
PAINLEVEL_OUTOF10: 0 - NO PAIN

## 2024-02-23 ASSESSMENT — ENCOUNTER SYMPTOMS
CHILLS: 0
COUGH: 0
PSYCHIATRIC NEGATIVE: 1
NAUSEA: 0
SHORTNESS OF BREATH: 0
DIFFICULTY URINATING: 0
EYES NEGATIVE: 1
ENDOCRINE NEGATIVE: 1
FEVER: 0
ALLERGIC/IMMUNOLOGIC NEGATIVE: 1

## 2024-02-23 ASSESSMENT — PAIN - FUNCTIONAL ASSESSMENT
PAIN_FUNCTIONAL_ASSESSMENT: CPOT (CRITICAL CARE PAIN OBSERVATION TOOL)
PAIN_FUNCTIONAL_ASSESSMENT: PAINAD (PAIN ASSESSMENT IN ADVANCED DEMENTIA SCALE)
PAIN_FUNCTIONAL_ASSESSMENT: CPOT (CRITICAL CARE PAIN OBSERVATION TOOL)
PAIN_FUNCTIONAL_ASSESSMENT: CPOT (CRITICAL CARE PAIN OBSERVATION TOOL)
PAIN_FUNCTIONAL_ASSESSMENT: PAINAD (PAIN ASSESSMENT IN ADVANCED DEMENTIA SCALE)
PAIN_FUNCTIONAL_ASSESSMENT: CPOT (CRITICAL CARE PAIN OBSERVATION TOOL)
PAIN_FUNCTIONAL_ASSESSMENT: PAINAD (PAIN ASSESSMENT IN ADVANCED DEMENTIA SCALE)
PAIN_FUNCTIONAL_ASSESSMENT: CPOT (CRITICAL CARE PAIN OBSERVATION TOOL)
PAIN_FUNCTIONAL_ASSESSMENT: PAINAD (PAIN ASSESSMENT IN ADVANCED DEMENTIA SCALE)
PAIN_FUNCTIONAL_ASSESSMENT: CPOT (CRITICAL CARE PAIN OBSERVATION TOOL)

## 2024-02-23 ASSESSMENT — PAIN SCALES - PAIN ASSESSMENT IN ADVANCED DEMENTIA (PAINAD)
BREATHING: NORMAL
NEGVOCALIZATION: REPEATED TROUBLED CALLING OUT, LOUD MOANING/GROANING, CRYING
CONSOLABILITY: NO NEED TO CONSOLE
TOTALSCORE: 8
FACIALEXPRESSION: FACIAL GRIMACING
NEGVOCALIZATION: REPEATED TROUBLED CALLING OUT, LOUD MOANING/GROANING, CRYING
BODYLANGUAGE: RELAXED
BREATHING: NORMAL
TOTALSCORE: 2
CONSOLABILITY: UNABLE TO CONSOLE, DISTRACT OR REASSURE
BREATHING: NORMAL
BREATHING: OCCASIONAL LABORED BREATHING, SHORT PERIOD OF HYPERVENTILATION
BODYLANGUAGE: TENSE, DISTRESSED PACING, FIDGETING
FACIALEXPRESSION: SMILING OR INEXPRESSIVE

## 2024-02-23 ASSESSMENT — COGNITIVE AND FUNCTIONAL STATUS - GENERAL
STANDING UP FROM CHAIR USING ARMS: TOTAL
CLIMB 3 TO 5 STEPS WITH RAILING: TOTAL
TURNING FROM BACK TO SIDE WHILE IN FLAT BAD: TOTAL
MOVING TO AND FROM BED TO CHAIR: TOTAL
WALKING IN HOSPITAL ROOM: TOTAL
MOVING FROM LYING ON BACK TO SITTING ON SIDE OF FLAT BED WITH BEDRAILS: TOTAL
MOBILITY SCORE: 6

## 2024-02-23 NOTE — PROGRESS NOTES
"General Surgery Progress Note    Continues to moan intermittently with pain, but does not verbalize where she is hurting. She moans equally when I palpate her abdomen, flank, but also when I just touch her legs. She remains afebrile. WBC is down to 13.7. Minimal bilious drainage from cholecystostomy tube removal site since yesterday morning.     /65   Pulse 91   Temp 37.5 °C (99.5 °F) (Temporal)   Resp 16   Ht 1.499 m (4' 11\")   Wt 98.2 kg (216 lb 7.9 oz)   SpO2 95%   BMI 43.73 kg/m²   NAD, laying in bed, turned on left side  No labored breathing, on supplemental oxygen  NSR, rate in 90's  Abdomen soft, obese, seemingly tender as she moans when palpated, but she does not seem any more tender in the RUQ compared to the remainder of her abdomen, does not have peritonitis.   Tender on left flank  Tender bilateral lower extremities, SCDs in place    Intake/Output Summary (Last 24 hours) at 2/23/2024 0655  Last data filed at 2/23/2024 0628  Gross per 24 hour   Intake 4198.74 ml   Output 850 ml   Net 3348.74 ml     WBC 13.7 (from 14.7, from 20, from 27)  CMP pending    Patient is a 68 y.o. female with left kidney stone, UTI, acute cholecystitis. S/p cholecystostomy tube placement with early dislodgement. Bile leak seems to have improved/resolved, or at least no longer draining through the skin. Her hemodynamics and leukocytosis are improving on antibiotics, but she still has altered mental status. She needs continued antibiotics. We are holding anticoagulation for possible procedures today. Scheduled with Dr. Garcia for cystoscopy/stent placement. Pending findings, may still require repeat cholecystostomy tube attempt.     Kindra Balderas MD  02/23/24  7:01 AM            "

## 2024-02-23 NOTE — PROGRESS NOTES
"Brianda Pedraza is a 68 y.o. female on day 4 of admission presenting with Septic shock (CMS/HCC).    Patient remains confused & appears to be in discomfort 2/2 abdominal pain.  Overnight Events: None    Objective   Vital Signs:  Blood pressure 129/81, pulse 86, temperature 37.5 °C (99.5 °F), resp. rate 18, height 1.499 m (4' 11\"), weight 98.2 kg (216 lb 7.9 oz), SpO2 92 %.    Physical Exam  Constitutional:       General: She is in acute distress.      Appearance: She is ill-appearing.   Eyes:      Extraocular Movements: Extraocular movements intact.      Conjunctiva/sclera: Conjunctivae normal.   Cardiovascular:      Rate and Rhythm: Normal rate and regular rhythm.      Pulses: Normal pulses.      Heart sounds: Normal heart sounds.   Pulmonary:      Effort: Pulmonary effort is normal.      Breath sounds: Normal breath sounds.   Abdominal:      Palpations: Abdomen is soft.      Tenderness: There is abdominal tenderness.   Skin:     General: Skin is warm.   Neurological:      Mental Status: She is alert. She is disoriented.         Wt Readings from Last 6 Encounters:   02/23/24 98.2 kg (216 lb 7.9 oz)   10/11/23 83.1 kg (183 lb 3.2 oz)       I/Os    Intake/Output Summary (Last 24 hours) at 2/23/2024 1451  Last data filed at 2/23/2024 1144  Gross per 24 hour   Intake 4520.46 ml   Output 500 ml   Net 4020.46 ml       Labs:   Results for orders placed or performed during the hospital encounter of 02/19/24 (from the past 24 hour(s))   POCT GLUCOSE   Result Value Ref Range    POCT Glucose 104 (H) 74 - 99 mg/dL   POCT GLUCOSE   Result Value Ref Range    POCT Glucose 92 74 - 99 mg/dL   CBC   Result Value Ref Range    WBC 13.7 (H) 4.4 - 11.3 x10*3/uL    nRBC 0.0 0.0 - 0.0 /100 WBCs    RBC 3.00 (L) 4.00 - 5.20 x10*6/uL    Hemoglobin 9.2 (L) 12.0 - 16.0 g/dL    Hematocrit 28.3 (L) 36.0 - 46.0 %    MCV 94 80 - 100 fL    MCH 30.7 26.0 - 34.0 pg    MCHC 32.5 32.0 - 36.0 g/dL    RDW 13.6 11.5 - 14.5 %    Platelets " 127 (L) 150 - 450 x10*3/uL   Green Top   Result Value Ref Range    Extra Tube Hold for add-ons.    Comprehensive metabolic panel   Result Value Ref Range    Glucose 96 74 - 99 mg/dL    Sodium 143 136 - 145 mmol/L    Potassium 3.0 (L) 3.5 - 5.3 mmol/L    Chloride 113 (H) 98 - 107 mmol/L    Bicarbonate 24 21 - 32 mmol/L    Anion Gap 9 (L) 10 - 20 mmol/L    Urea Nitrogen 17 6 - 23 mg/dL    Creatinine 0.43 (L) 0.50 - 1.05 mg/dL    eGFR >90 >60 mL/min/1.73m*2    Calcium 7.8 (L) 8.6 - 10.3 mg/dL    Albumin 2.2 (L) 3.4 - 5.0 g/dL    Alkaline Phosphatase 84 33 - 136 U/L    Total Protein 4.6 (L) 6.4 - 8.2 g/dL    AST 15 9 - 39 U/L    Bilirubin, Total 1.9 (H) 0.0 - 1.2 mg/dL    ALT 12 7 - 45 U/L   TSH   Result Value Ref Range    Thyroid Stimulating Hormone 0.56 0.44 - 3.98 mIU/L   Magnesium   Result Value Ref Range    Magnesium 1.47 (L) 1.60 - 2.40 mg/dL   Lipase   Result Value Ref Range    Lipase 16 9 - 82 U/L   POCT GLUCOSE   Result Value Ref Range    POCT Glucose 90 74 - 99 mg/dL   Ammonia   Result Value Ref Range    Ammonia 30 16 - 53 umol/L   Lactate   Result Value Ref Range    Lactate 0.7 0.4 - 2.0 mmol/L   SST TOP   Result Value Ref Range    Extra Tube Hold for add-ons.    BLOOD GAS ARTERIAL   Result Value Ref Range    POCT pH, Arterial 7.52 (H) 7.38 - 7.42 pH    POCT pCO2, Arterial 29 (L) 38 - 42 mm Hg    POCT pO2, Arterial 65 (L) 85 - 95 mm Hg    POCT SO2, Arterial 96 94 - 100 %    POCT Oxy Hemoglobin, Arterial 93.0 (L) 94.0 - 98.0 %    POCT Base Excess, Arterial 1.7 -2.0 - 3.0 mmol/L    POCT HCO3 Calculated, Arterial 23.7 22.0 - 26.0 mmol/L    Patient Temperature 37.0 degrees Celsius    FiO2 28 %    Site of Arterial Puncture Brachial Left     Chapin's Test Positive        Imaging:  US guided abscess fluid collection drainage    Result Date: 2/23/2024  Interpreted By:  Jefe Barrera, STUDY: US GUIDED ABSCESS FLUID COLLECTION DRAINAGE;  2/23/2024 1:36 pm   INDICATION: Signs/Symptoms:Cholecystitis.   COMPARISON: None.    ACCESSION NUMBER(S): [id number]   ORDERING CLINICIAN: FACUNDO NORIEGA   TECHNIQUE: Ultrasound guided  percutaneous cholecystotomy tube placement.   FINDINGS: The procedure and the benefits, risks and potential complications in addition to alternatives of the procedure were discussed with the patient by Dr. Barrera and signed informed consent was obtained.   Patient's vital signs were monitored by the radiology nurse.   Limited axial ultrasound images were obtained through the right upper quadrant. The images demonstrated  the gallbladder to be dilated with heterogeneous sludge and stones, similar to prior studies. The patient prepped in the usual sterile manner. 1% lidocaine was used for local anesthesia at the planned skin insertion site.   Under direct ultrasound guidance, a 8.5 [place] needle/catheter was placed into the gallbladder via an anterior percutaneous subhepatic approach. After confirmation of position of the needle within the fluid collection, the inner needle/stylette was withdrawn. After confirmation of position of the catheter within the collection, the guidewire was removed, the pigtail fixed and the catheter secured to the skin. The catheter was then set to drainage. 20 ML of  can itch brown purulent fluid was collected.   Postprocedure images demonstrated no evidence of hemorrhage.   Patient tolerated the procedure without immediate complication. Fluid was sent to the laboratory and cytology for further analysis.       Successful percutaneous cholecystotomy tube placement, as above.   Signed by: Jefe Barrera 2/23/2024 2:47 PM Dictation workstation:   [id number]    FL fluoro images no charge    Result Date: 2/23/2024  These images are not reportable by radiology and will not be interpreted by  Radiologists.      Medications:    Current Facility-Administered Medications:     acetaminophen (Tylenol) tablet 650 mg, 650 mg, oral, q4h PRN **OR** [DISCONTINUED] acetaminophen (Tylenol) oral liquid 650  mg, 650 mg, nasogastric tube, q4h PRN **OR** [DISCONTINUED] acetaminophen (Tylenol) suppository 650 mg, 650 mg, rectal, q4h PRN, Enrique Guevara MD    [Held by provider] aspirin EC tablet 81 mg, 81 mg, oral, Nightly, Jeremiah Seay DO, 81 mg at 02/20/24 2044    [Held by provider] atorvastatin (Lipitor) tablet 40 mg, 40 mg, oral, Daily, Jeremiah Seay DO, 40 mg at 02/20/24 1142    dextrose 10 % in water (D10W) infusion, 0.3 g/kg/hr, intravenous, Once PRN, Jeremiah Seay DO    dextrose 50 % injection 25 g, 25 g, intravenous, q15 min PRN, Jeremiah Seay DO    [Held by provider] DULoxetine (Cymbalta) DR capsule 60 mg, 60 mg, oral, Daily, Jeremiah Seay DO, 60 mg at 02/20/24 1143    [Held by provider] gabapentin (Neurontin) capsule 300 mg, 300 mg, oral, TID, Jeremiah Seay DO, 300 mg at 02/20/24 2044    glucagon (Glucagen) injection 1 mg, 1 mg, intramuscular, q15 min PRN, Jeremiah Seay DO    HYDROmorphone (Dilaudid) injection 0.5 mg, 0.5 mg, intravenous, q4h PRN, Jeremiah Seay DO, 0.5 mg at 02/21/24 1336    HYDROmorphone (Dilaudid) injection 2 mg, 2 mg, intravenous, q3h PRN, Jeremiah Seay DO, 2 mg at 02/23/24 1010    insulin lispro (HumaLOG) injection 0-5 Units, 0-5 Units, subcutaneous, TID with meals, Jeremiah Seay DO    lactated Ringer's infusion, 75 mL/hr, intravenous, Continuous, Jeremiah Seay DO, Last Rate: 75 mL/hr at 02/23/24 0800, 75 mL/hr at 02/23/24 0800    levoFLOXacin (Levaquin)  mg, 750 mg, intravenous, q24h, Jeremiah Seay DO, Stopped at 02/23/24 1144    lubricating eye drops ophthalmic solution 1 drop, 1 drop, Both Eyes, TID, Jeremiah Seay DO, 1 drop at 02/23/24 0900    nystatin (Mycostatin) 100,000 unit/gram powder 1 Application, 1 Application, Topical, BID, Jeremiah Seay DO, 1 Application at 02/23/24 0827    [DISCONTINUED] ondansetron ODT (Zofran-ODT) disintegrating tablet 4 mg, 4 mg, oral, q8h PRN **OR** ondansetron (Zofran) injection 4 mg, 4 mg, intravenous, q8h PRN, Jeremiah  DO [place]    oxygen (O2) therapy, , inhalation, Continuous PRN - O2/gases, Jeremiah Seay DO, 2 L/min at 02/23/24 0605    polyethylene glycol (Glycolax, Miralax) packet 17 g, 17 g, oral, Daily, Jeremiah Seay DO, 17 g at 02/19/24 1556    potassium chloride CR (Klor-Con M20) ER tablet 40 mEq, 40 mEq, oral, Once, Jeremiah Seay DO    sennosides (Senokot) tablet 8.6 mg, 1 tablet, oral, BID, Jeremiah Seay DO, 8.6 mg at 02/20/24 2044    Assessment & Plan    Hannah Pedraza is a 68 y.o. female on day 4 of admission to [facility] for management of cholecystitis & hydronephrosis 2/2 ureteral calculus     Acute cholecystitis s.p cholecystostomy  Hydronephrosis with urinary obstruction 2/2 ureteral calculus  Klebsiella pneumoniae bacteremia  Complicated UTI 2/2 Proteus Mirabilis & Klebsiella  Metabolic Encephalopathy    Plan:  - She is s/p cystoscopy with lithotripsy & stent placement  - Per Dr. Balderas, cholecystostomy tube has purulent output (80cc)  - IV antibiotic therapy switched to Levaquin 750 mg once daily & IV Bactrim q12h based on blood and urine cultures  - Remains confused, will continue to monitor mentation    Disposition: Not medically ready for discharge.    High level of MDM based on above medical conditions & discussion of plan.    Jeremiah Seay DO  Internal Medicine

## 2024-02-23 NOTE — CARE PLAN
Problem: Infection prevention/bleeding  Goal: No further progression of infection  Outcome: Progressing     Problem: Fluid/Electrolyte/Nutrition  Goal: Normal electrolyte levels  Outcome: Progressing     Problem: Skin  Goal: Participates in plan/prevention/treatment measures  Outcome: Met  Goal: Prevent/manage excess moisture  Outcome: Met  Goal: Prevent/minimize sheer/friction injuries  Outcome: Met  Goal: Promote/optimize nutrition  Outcome: Met  Goal: Promote skin healing  Outcome: Met     Problem: Perfusion/Cardiac  Goal: Adequate perfusion to organs/extremities  Outcome: Met  Goal: Hemodynamically stable  Outcome: Met  Goal: No cardiac arrhythmias  Outcome: Met     Problem: Respiratory/Oxygenation  Goal: No signs of respiratory compromise  Outcome: Met     Problem: Fluid/Electrolyte/Nutrition  Goal: Normal glucose levels  Outcome: Met    The clinical goals for the shift include pain control, prevent skin breakdown    Pt yelling out consistently throughout shift. Pt to OR, drain placed RUQ. Pt started on PCA for continuous yelling and CPOT > 3.

## 2024-02-23 NOTE — PROGRESS NOTES
General surgery afternoon rounds    Patient underwent cystoscopy with lithotripsy and stent placement by Dr. Garcia.  She was then taken to Radiology where she underwent cholecystostomy tube replacement.  The cholecystostomy tube had purulent output on placement and directly enters the gallbladder.  I drained an additional 80 cc at bedside in the ICU from her accordion bag.  The drainage is relatively thin, but if at any point this evening it looks thicker or with clot, could flush once overnight.  Between these 2 procedures, we should have much improved source control of her infection.  Recommend continued IV antibiotics.  Recommend keeping NPO until mental status improves.  From surgical standpoint, could resume anticoagulation later this evening.    Kindra Balderas MD  02/23/24  1:49 PM

## 2024-02-23 NOTE — PROGRESS NOTES
Occupational Therapy                 Therapy Communication Note    Patient Name: Hannah Pedraza  MRN: [id number]  Today's Date: 2/23/2024     Discipline: Occupational Therapy    Missed Visit Reason: Missed Visit Reason: Patient in a medical procedure (Off the floor for procedure. Will re-attempt as medically appropriate at later time.)    Missed Time: Attempt    Comment:

## 2024-02-23 NOTE — PROGRESS NOTES
Per medical team, patient is not yet medically appropriate for discharge today; had a procedure with Dr. Garcia; Dr. Balderas also consulting.  to meet with patient at bedside to review discharge plan and Medicare IMM. SW sent updated notes to [facility] via [employer]. SW reminded [facility] that [facility] cannot hold patient to wait for a denial of a skilled stay; Patient is not currently appropriate to participate in therapies. Patient will need to return to [facility] under Medicaid.  - 1510: Per hospitalist, patient will not likely discharge before the weekend. Plan for patient to return to [facility] when medically ready for long-term care under patient's Medicaid. Care Transitions to follow and assist. CHAYO Morales

## 2024-02-23 NOTE — PROGRESS NOTES
Physical Therapy                 Therapy Communication Note    Patient Name: Hannah Pedraza  MRN: [id number]  Today's Date: 2/23/2024     Discipline: Physical Therapy    Missed Visit Reason: Missed Visit Reason: Patient in a medical procedure (per bed nurse patient is still not alert and not appropriate for PT; hold again today)    Missed Time: Attempt    Comment:

## 2024-02-23 NOTE — OP NOTE
Cystoscopy with Lithotripsy Laser (L), Cystoscopy with Retrograde Pyelogram (L) Operative Note     Date: 2024 - 2024  OR Location: [facility] OR    Name: Hannah Pedraza, : 1955, Age: 68 y.o., MRN: [id number], Sex: female    Diagnosis  Pre-op Diagnosis     * Calculus of ureter [N20.1]     * Hydronephrosis with urinary obstruction due to ureteral calculus [N13.2] Post-op Diagnosis     * Calculus of ureter [N20.1]     * Hydronephrosis with urinary obstruction due to ureteral calculus [N13.2]     Procedures  Cystoscopy with Lithotripsy Laser  42535 - NE CYSTO/URETERO W/LITHOTRIPSY &INDWELL STENT INSRT    Cystoscopy with Retrograde Pyelogram  01206 - CHG UROGRAPHY RETROGRADE WITH/WO KUB      Surgeons      * Dion Garcia - Primary    Resident/Fellow/Other Assistant:  Surgeon(s) and Role:    Procedure Summary  Anesthesia: General  ASA: III  Anesthesia Staff: Anesthesiologist: Jonny Whitfield MD  Estimated Blood Loss: 0mL  Intra-op Medications:   Administrations occurring from 1100 to 1140 on 24:   Medication Name Total Dose   iohexol (OMNIPaque) 300 mg iodine/mL solution 20 mL   potassium chloride 20 mEq in 100 mL IV premix Cannot be calculated              Anesthesia Record               Intraprocedure I/O Totals       None           Specimen: No specimens collected     Staff:   Circulator: Bernice Faith RN  Scrub Person: Vandana Reyes RN  X-Ray Technologist: Soco Lynn         Drains and/or Catheters:   External Urinary Catheter Female (Active)   Wall Suction (mmHg) 40 24 1200   External Catheter Status Changed 24 1200   Securement Method Mesh panties 24 0530   Output (mL) 50 mL 24 0628       Tourniquet Times:         Implants:  Implants       Type Name Action Serial No.      Shunt STENT, POLARIS ULTRA  5FR 24CM, DISPOSABLE - [id number] - [id number] Implanted [id number]                Indications: Hannah Pedraza is an 68 y.o. female who is having surgery for Calculus of  ureter [N20.1]  Hydronephrosis with urinary obstruction due to ureteral calculus [N13.2].     The patient was seen in the preoperative area. The risks, benefits, complications, treatment options, non-operative alternatives, expected recovery and outcomes were discussed with the patient. The possibilities of reaction to medication, pulmonary aspiration, injury to surrounding structures, bleeding, recurrent infection, the need for additional procedures, failure to diagnose a condition, and creating a complication requiring transfusion or operation were discussed with the patient. The patient concurred with the proposed plan, giving informed consent.  The site of surgery was properly noted/marked if necessary per policy. The patient has been actively warmed in preoperative area.   Preoperative diagnosis: Left ureteral stone  Postoperative diagnosis: Same  Procedure: cystoscopy with Left RPG and ureteroscopy and Left stent insertion  Physician: ENA  Anesthesia: Gen.  Estimated blood loss: None  Indications and consent: The patient presents for treatment of a left ureteral stone. After the risks, benefits, alternatives, and indications of the procedure were explained to the patient they consented.  Procedure: The patient was brought to the operating room and placed on the table in the supine position. After adequate anesthesia was obtained, the patient was prepped and draped in the standard surgical fashion. First, the cystoscope was inserted into the bladder. Formal cystoscopy was performed.  A Left retrograde pyelogram was performed under low pressure which suggested a filling defect in the proximal ureter and showed mild hydro.  ..We then performed ureteroscopy however the ureteroscope was unable to reach the stone. The stone was pushed proximally using the 5 Albanian open-ended catheter which allowed us to place the guidewire past the stone into the renal pelvis. A 5 x 24 stent was placed over the existing guidewire.  This was done under fluoroscopic vision. Proper positioning was confirmed.The patient tolerated the procedure well and there no complications.     F/U with KUB and consider ESWL    Attending Attestation: I was present and scrubbed for the entire procedure.    Dion Garcia  Phone Number: [phone]

## 2024-02-23 NOTE — ANESTHESIA PREPROCEDURE EVALUATION
Patient: Hannah Pedraza    Procedure Information       Date/Time: 02/23/24 1100    Procedures:       Cystoscopy with Lithotripsy Laser (Left)      Cystoscopy with Retrograde Pyelogram (Left)    Location: [facility] OR 03 / [facility] OR    Surgeons: Dion Garcia MD            Relevant Problems   Anesthesia (within normal limits)      Cardiovascular   (+) Primary hypertension      Endocrine   (+) Type 2 diabetes mellitus with hyperglycemia, without long-term current use of insulin (CMS/HCC)      /Renal   (+) Hydronephrosis with urinary obstruction due to ureteral calculus      Infectious Disease   (+) Septic shock (CMS/HCC)       Clinical information reviewed:   Tobacco  Allergies  Meds   Med Hx  Surg Hx   Fam Hx  Soc Hx        NPO Detail:  No data recorded     Physical Exam    Airway  Mallampati: II  TM distance: >3 FB  Neck ROM: full     Cardiovascular   Rhythm: regular  Rate: normal     Dental   (+) upper dentures, lower dentures     Pulmonary    Abdominal            Anesthesia Plan    History of general anesthesia?: yes  History of complications of general anesthesia?: no    ASA 3     general and MAC     intravenous induction   Anesthetic plan and risks discussed with patient and spouse.

## 2024-02-23 NOTE — ANESTHESIA POSTPROCEDURE EVALUATION
Patient: Hannah Pedraza    Procedure Summary       Date: 02/23/24 Room / Location: [facility] OR 03 / [facility] OR    Anesthesia Start: 1117 Anesthesia Stop: 1148    Procedure: Cystoscopy with Retrograde Pyelogram (Left) Diagnosis:       Calculus of ureter      Hydronephrosis with urinary obstruction due to ureteral calculus      (Calculus of ureter [N20.1])      (Hydronephrosis with urinary obstruction due to ureteral calculus [N13.2])    Surgeons: Dion Garcia MD Responsible Provider: Jonny Whitfield MD    Anesthesia Type: general, MAC ASA Status: 3            Anesthesia Type: general, MAC    Vitals Value Taken Time   /75 02/23/24 1205   Temp 37.3 °C (99.2 °F) 02/23/24 1143   Pulse 95 02/23/24 1210   Resp 20 02/23/24 1210   SpO2 94 % 02/23/24 1210       Anesthesia Post Evaluation    Patient location during evaluation: bedside  Patient participation: complete - patient participated  Level of consciousness: sleepy but conscious  Pain management: adequate  Airway patency: patent  Cardiovascular status: acceptable  Respiratory status: acceptable  Hydration status: acceptable  Postoperative Nausea and Vomiting: none    No notable events documented.

## 2024-02-23 NOTE — H&P
History Of Present Illness  Hannah Pedraza is a 68 y.o. female presenting with left ureteral stone.     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 No pertinent surgical history.     Social History  She reports that she has never smoked. She has never been exposed to tobacco smoke. She has never used smokeless tobacco. She reports that she does not drink alcohol and does not use drugs.    Family History  No family history on file.     Allergies  Morphine    Review of Systems   Constitutional:  Negative for chills and fever.   HENT: Negative.     Eyes: Negative.    Respiratory:  Negative for cough and shortness of breath.    Cardiovascular:  Negative for chest pain and leg swelling.   Gastrointestinal:  Negative for nausea.   Endocrine: Negative.    Genitourinary:  Negative for difficulty urinating.        Negative except for documented in HPI   Allergic/Immunologic: Negative.    Neurological:         Alert & oriented X 3   Hematological:         Denies blood thinners   Psychiatric/Behavioral: Negative.          Physical Exam  Vitals and nursing note reviewed.   Pulmonary:      Effort: Pulmonary effort is normal.      Breath sounds: Normal breath sounds.   Abdominal:      Palpations: Abdomen is soft.      Tenderness: There is no abdominal tenderness.   Genitourinary:     Comments: Kidneys non palpable bilaterally  Bladder non palpable or  "tender  Neurological:      Mental Status: She is alert.          Last Recorded Vitals  Blood pressure 126/74, pulse 89, temperature 37 °C (98.6 °F), temperature source Temporal, resp. rate 21, height 1.499 m (4' 11\"), weight 98.2 kg (216 lb 7.9 oz), SpO2 95 %.         Assessment/Plan   Principal Problem:    Septic shock (CMS/HCC)  Active Problems:    Calculus of ureter    Hydronephrosis with urinary obstruction due to ureteral calculus        Dion Garcia MD    "

## 2024-02-24 LAB
ALBUMIN SERPL BCP-MCNC: 2.4 G/DL (ref 3.4–5)
ALP SERPL-CCNC: 91 U/L (ref 33–136)
ALT SERPL W P-5'-P-CCNC: 11 U/L (ref 7–45)
ANION GAP SERPL CALC-SCNC: 13 MMOL/L (ref 10–20)
AST SERPL W P-5'-P-CCNC: 16 U/L (ref 9–39)
ATRIAL RATE: 136 BPM
BILIRUB DIRECT SERPL-MCNC: 0.8 MG/DL (ref 0–0.3)
BILIRUB SERPL-MCNC: 1.8 MG/DL (ref 0–1.2)
BUN SERPL-MCNC: 21 MG/DL (ref 6–23)
CALCIUM SERPL-MCNC: 8.1 MG/DL (ref 8.6–10.3)
CHLORIDE SERPL-SCNC: 113 MMOL/L (ref 98–107)
CO2 SERPL-SCNC: 22 MMOL/L (ref 21–32)
CREAT SERPL-MCNC: 0.45 MG/DL (ref 0.5–1.05)
EGFRCR SERPLBLD CKD-EPI 2021: >90 ML/MIN/1.73M*2
ERYTHROCYTE [DISTWIDTH] IN BLOOD BY AUTOMATED COUNT: 13.4 % (ref 11.5–14.5)
GLUCOSE BLD MANUAL STRIP-MCNC: 150 MG/DL (ref 74–99)
GLUCOSE BLD MANUAL STRIP-MCNC: 159 MG/DL (ref 74–99)
GLUCOSE BLD MANUAL STRIP-MCNC: 175 MG/DL (ref 74–99)
GLUCOSE BLD MANUAL STRIP-MCNC: 200 MG/DL (ref 74–99)
GLUCOSE SERPL-MCNC: 154 MG/DL (ref 74–99)
HCT VFR BLD AUTO: 29.5 % (ref 36–46)
HGB BLD-MCNC: 9.4 G/DL (ref 12–16)
HOLD SPECIMEN: NORMAL
MAGNESIUM SERPL-MCNC: 1.65 MG/DL (ref 1.6–2.4)
MCH RBC QN AUTO: 29.6 PG (ref 26–34)
MCHC RBC AUTO-ENTMCNC: 31.9 G/DL (ref 32–36)
MCV RBC AUTO: 93 FL (ref 80–100)
NRBC BLD-RTO: 0 /100 WBCS (ref 0–0)
P AXIS: -56 DEGREES
P OFFSET: 199 MS
P ONSET: 139 MS
PHOSPHATE SERPL-MCNC: 2.8 MG/DL (ref 2.5–4.9)
PLATELET # BLD AUTO: 168 X10*3/UL (ref 150–450)
POTASSIUM SERPL-SCNC: 3.6 MMOL/L (ref 3.5–5.3)
PR INTERVAL: 170 MS
PROT SERPL-MCNC: 5.1 G/DL (ref 6.4–8.2)
Q ONSET: 224 MS
QRS COUNT: 23 BEATS
QRS DURATION: 72 MS
QT INTERVAL: 280 MS
QTC CALCULATION(BAZETT): 422 MS
QTC FREDERICIA: 368 MS
R AXIS: 28 DEGREES
RBC # BLD AUTO: 3.18 X10*6/UL (ref 4–5.2)
SODIUM SERPL-SCNC: 144 MMOL/L (ref 136–145)
T AXIS: 38 DEGREES
T OFFSET: 364 MS
VENTRICULAR RATE: 137 BPM
WBC # BLD AUTO: 9.5 X10*3/UL (ref 4.4–11.3)

## 2024-02-24 PROCEDURE — 2020000001 HC ICU ROOM DAILY

## 2024-02-24 PROCEDURE — 94761 N-INVAS EAR/PLS OXIMETRY MLT: CPT

## 2024-02-24 PROCEDURE — 94660 CPAP INITIATION&MGMT: CPT

## 2024-02-24 PROCEDURE — 82947 ASSAY GLUCOSE BLOOD QUANT: CPT

## 2024-02-24 PROCEDURE — 84100 ASSAY OF PHOSPHORUS: CPT | Performed by: STUDENT IN AN ORGANIZED HEALTH CARE EDUCATION/TRAINING PROGRAM

## 2024-02-24 PROCEDURE — 99233 SBSQ HOSP IP/OBS HIGH 50: CPT | Performed by: INTERNAL MEDICINE

## 2024-02-24 PROCEDURE — 80048 BASIC METABOLIC PNL TOTAL CA: CPT | Performed by: STUDENT IN AN ORGANIZED HEALTH CARE EDUCATION/TRAINING PROGRAM

## 2024-02-24 PROCEDURE — 36415 COLL VENOUS BLD VENIPUNCTURE: CPT | Performed by: STUDENT IN AN ORGANIZED HEALTH CARE EDUCATION/TRAINING PROGRAM

## 2024-02-24 PROCEDURE — 84075 ASSAY ALKALINE PHOSPHATASE: CPT | Performed by: STUDENT IN AN ORGANIZED HEALTH CARE EDUCATION/TRAINING PROGRAM

## 2024-02-24 PROCEDURE — 85027 COMPLETE CBC AUTOMATED: CPT | Performed by: STUDENT IN AN ORGANIZED HEALTH CARE EDUCATION/TRAINING PROGRAM

## 2024-02-24 PROCEDURE — 99233 SBSQ HOSP IP/OBS HIGH 50: CPT | Performed by: SURGERY

## 2024-02-24 PROCEDURE — 2500000004 HC RX 250 GENERAL PHARMACY W/ HCPCS (ALT 636 FOR OP/ED): Performed by: STUDENT IN AN ORGANIZED HEALTH CARE EDUCATION/TRAINING PROGRAM

## 2024-02-24 PROCEDURE — 83735 ASSAY OF MAGNESIUM: CPT | Performed by: STUDENT IN AN ORGANIZED HEALTH CARE EDUCATION/TRAINING PROGRAM

## 2024-02-24 PROCEDURE — 87040 BLOOD CULTURE FOR BACTERIA: CPT | Mod: SAMLAB | Performed by: STUDENT IN AN ORGANIZED HEALTH CARE EDUCATION/TRAINING PROGRAM

## 2024-02-24 PROCEDURE — 2500000001 HC RX 250 WO HCPCS SELF ADMINISTERED DRUGS (ALT 637 FOR MEDICARE OP): Performed by: STUDENT IN AN ORGANIZED HEALTH CARE EDUCATION/TRAINING PROGRAM

## 2024-02-24 PROCEDURE — 2500000002 HC RX 250 W HCPCS SELF ADMINISTERED DRUGS (ALT 637 FOR MEDICARE OP, ALT 636 FOR OP/ED): Performed by: STUDENT IN AN ORGANIZED HEALTH CARE EDUCATION/TRAINING PROGRAM

## 2024-02-24 PROCEDURE — 99233 SBSQ HOSP IP/OBS HIGH 50: CPT | Performed by: STUDENT IN AN ORGANIZED HEALTH CARE EDUCATION/TRAINING PROGRAM

## 2024-02-24 PROCEDURE — 2500000004 HC RX 250 GENERAL PHARMACY W/ HCPCS (ALT 636 FOR OP/ED): Performed by: INTERNAL MEDICINE

## 2024-02-24 RX ORDER — LORAZEPAM 2 MG/ML
2 INJECTION INTRAMUSCULAR ONCE
Status: COMPLETED | OUTPATIENT
Start: 2024-02-24 | End: 2024-02-24

## 2024-02-24 RX ORDER — DEXTROSE, SODIUM CHLORIDE, SODIUM LACTATE, POTASSIUM CHLORIDE, AND CALCIUM CHLORIDE 5; .6; .31; .03; .02 G/100ML; G/100ML; G/100ML; G/100ML; G/100ML
110 INJECTION, SOLUTION INTRAVENOUS CONTINUOUS
Status: DISCONTINUED | OUTPATIENT
Start: 2024-02-24 | End: 2024-02-25

## 2024-02-24 RX ORDER — DEXMEDETOMIDINE HYDROCHLORIDE 4 UG/ML
0.4 INJECTION, SOLUTION INTRAVENOUS CONTINUOUS
Status: DISCONTINUED | OUTPATIENT
Start: 2024-02-24 | End: 2024-02-25

## 2024-02-24 RX ORDER — ENOXAPARIN SODIUM 100 MG/ML
40 INJECTION SUBCUTANEOUS EVERY 12 HOURS SCHEDULED
Status: DISCONTINUED | OUTPATIENT
Start: 2024-02-24 | End: 2024-02-26 | Stop reason: HOSPADM

## 2024-02-24 RX ADMIN — ENOXAPARIN SODIUM 40 MG: 40 INJECTION SUBCUTANEOUS at 20:25

## 2024-02-24 RX ADMIN — LORAZEPAM 2 MG: 2 INJECTION INTRAMUSCULAR; INTRAVENOUS at 00:34

## 2024-02-24 RX ADMIN — ENOXAPARIN SODIUM 40 MG: 40 INJECTION SUBCUTANEOUS at 10:37

## 2024-02-24 RX ADMIN — POLYVINYL ALCOHOL, POVIDONE 1 DROP: 14; 6 SOLUTION/ DROPS OPHTHALMIC at 20:28

## 2024-02-24 RX ADMIN — SODIUM CHLORIDE, SODIUM LACTATE, POTASSIUM CHLORIDE, CALCIUM CHLORIDE AND DEXTROSE MONOHYDRATE 100 ML/HR: 5; 600; 310; 30; 20 INJECTION, SOLUTION INTRAVENOUS at 10:37

## 2024-02-24 RX ADMIN — FENTANYL CITRATE 50 MCG: 50 INJECTION, SOLUTION INTRAMUSCULAR; INTRAVENOUS at 23:08

## 2024-02-24 RX ADMIN — NYSTATIN 1 APPLICATION: 100000 POWDER TOPICAL at 20:26

## 2024-02-24 RX ADMIN — FENTANYL CITRATE: 0.05 INJECTION, SOLUTION INTRAMUSCULAR; INTRAVENOUS at 20:54

## 2024-02-24 RX ADMIN — PIPERACILLIN SODIUM AND TAZOBACTAM SODIUM 4.5 G: 4; .5 INJECTION, SOLUTION INTRAVENOUS at 20:25

## 2024-02-24 RX ADMIN — PIPERACILLIN SODIUM AND TAZOBACTAM SODIUM 4.5 G: 4; .5 INJECTION, SOLUTION INTRAVENOUS at 10:36

## 2024-02-24 RX ADMIN — FENTANYL CITRATE 50 MCG: 50 INJECTION, SOLUTION INTRAMUSCULAR; INTRAVENOUS at 20:50

## 2024-02-24 RX ADMIN — SODIUM CHLORIDE, SODIUM LACTATE, POTASSIUM CHLORIDE, CALCIUM CHLORIDE AND DEXTROSE MONOHYDRATE 100 ML/HR: 5; 600; 310; 30; 20 INJECTION, SOLUTION INTRAVENOUS at 23:24

## 2024-02-24 RX ADMIN — DEXMEDETOMIDINE HYDROCHLORIDE 0.2 MCG/KG/HR: 400 INJECTION INTRAVENOUS at 08:40

## 2024-02-24 RX ADMIN — DEXMEDETOMIDINE HYDROCHLORIDE 0.4 MCG/KG/HR: 400 INJECTION INTRAVENOUS at 19:50

## 2024-02-24 RX ADMIN — INSULIN LISPRO 1 UNITS: 100 INJECTION, SOLUTION INTRAVENOUS; SUBCUTANEOUS at 12:09

## 2024-02-24 RX ADMIN — INSULIN LISPRO 1 UNITS: 100 INJECTION, SOLUTION INTRAVENOUS; SUBCUTANEOUS at 18:07

## 2024-02-24 RX ADMIN — PIPERACILLIN SODIUM AND TAZOBACTAM SODIUM 4.5 G: 4; .5 INJECTION, SOLUTION INTRAVENOUS at 15:10

## 2024-02-24 RX ADMIN — POLYVINYL ALCOHOL, POVIDONE 1 DROP: 14; 6 SOLUTION/ DROPS OPHTHALMIC at 15:09

## 2024-02-24 RX ADMIN — FENTANYL CITRATE 50 MCG: 50 INJECTION, SOLUTION INTRAMUSCULAR; INTRAVENOUS at 14:08

## 2024-02-24 RX ADMIN — NYSTATIN 1 APPLICATION: 100000 POWDER TOPICAL at 15:10

## 2024-02-24 ASSESSMENT — PAIN - FUNCTIONAL ASSESSMENT

## 2024-02-24 ASSESSMENT — PAIN SCALES - GENERAL
PAINLEVEL_OUTOF10: 0 - NO PAIN
PAINLEVEL_OUTOF10: 4
PAINLEVEL_OUTOF10: 0 - NO PAIN
PAINLEVEL_OUTOF10: 2
PAINLEVEL_OUTOF10: 0 - NO PAIN
PAINLEVEL_OUTOF10: 6
PAINLEVEL_OUTOF10: 0 - NO PAIN

## 2024-02-24 ASSESSMENT — COGNITIVE AND FUNCTIONAL STATUS - GENERAL
MOBILITY SCORE: 6
TURNING FROM BACK TO SIDE WHILE IN FLAT BAD: TOTAL
DAILY ACTIVITIY SCORE: 6
WALKING IN HOSPITAL ROOM: TOTAL
MOVING FROM LYING ON BACK TO SITTING ON SIDE OF FLAT BED WITH BEDRAILS: TOTAL
CLIMB 3 TO 5 STEPS WITH RAILING: TOTAL
DRESSING REGULAR LOWER BODY CLOTHING: TOTAL
EATING MEALS: TOTAL
DRESSING REGULAR UPPER BODY CLOTHING: TOTAL
STANDING UP FROM CHAIR USING ARMS: TOTAL
HELP NEEDED FOR BATHING: TOTAL
TOILETING: TOTAL
MOVING TO AND FROM BED TO CHAIR: TOTAL
PERSONAL GROOMING: TOTAL

## 2024-02-24 NOTE — CONSULTS
Reason For Consult  Advanced Care Planning     History Of Present Illness   68-year-old obese female, nonambulatory, with a past medical history of hypertension, hyperlipidemia, obesity, GERD, left lower extremity DVT, anxiety, depression, diabetes mellitus, congestive heart failure, insomnia, TIA and overactive bladder who presented to the emergency room on 2/19/2024 for abdominal pain.  Associated with nausea and fever.  Workup showed an acute cholecystitis and a urinary tract infection/pyelonephritis associated with septic shock.  Imaging also showed a left-sided obstructive uropathy with a mild left-sided hydronephrosis and an 11 mm calculus at the proximal left ureter.  General surgery consulted.  Patient admitted to the medical service.  Started on IV antibiotics, and a cholecystostomy tube was placed instead of doing cholecystectomy because of the septic shock and the requirement of vasopressors.  After resolution of the septic shock, urology performed a cystoscopy with left-sided lithotripsy laser along with insertion of a ureteral stent.  Patient hospital course has been associated with pain.  That has been poorly controlled.  So on 2/23/2024, she was started on fentanyl infusion 25 mcg/hour with additional 50 mcg IV every 30 minutes as needed for breakthrough pain.  But yet despite giving the medication, patient still moaning and yelling.  Palliative care consulted for symptom management.  Upon encounter, patient is asleep.  And while in her sleep, she is moaning and yelling.  She did not wake up when I called her name.  I did not use noxious stimuli to wake her up.  Bedside nurse noted that patient is currently NPO.  And that they did a bladder scan and ruled out urinary retention.  Also, the 50 mcg fentanyl is not helping at all.  No family at bedside.     Review of Systems  10 systems were reviewed and were negative except for those noted in the history of present illness.     Past Medical History  She  has a past medical history of Acute embolism and thrombosis of deep vein of left lower extremity (CMS/[facility]), Acute kidney failure (CMS/[facility]), Anxiety, Arthritis, Benign neoplasm of unspecified adrenal gland, Calculus of gallbladder, Depression, Diabetes mellitus (CMS/[facility]), Dry eye syndrome of right lacrimal gland, Heart failure, unspecified (CMS/[facility]), Hyperlipidemia, unspecified, Hypertension, Insomnia, Mild intermittent asthma, Overactive bladder, TIA (transient ischemic attack), and Vitamin D deficiency.  Pertinent medical history also documented in my above narrative    Surgical History  She has no past surgical history on file.  Pertinent surgical history also documented in my above narrative     Social History  She reports that she has never smoked. She has never been exposed to tobacco smoke. She has never used smokeless tobacco. She reports that she does not drink alcohol and does not use drugs.    Family History  No family history on file.     Allergies  Morphine    Home Medications  Medications Prior to Admission   Medication Sig Dispense Refill Last Dose    acetaminophen (Tylenol) 500 mg tablet Take 2 tablets (1,000 mg) by mouth 3 times a day.   Unknown    albuterol 90 mcg/actuation inhaler Inhale 2 puffs every 4 hours if needed for shortness of breath.   Unknown    apixaban (Eliquis) 5 mg tablet Take 1 tablet (5 mg) by mouth 2 times a day.   Unknown    aspirin 81 mg capsule Take 81 mg by mouth once daily at bedtime.   Unknown    atorvastatin (Lipitor) 40 mg tablet Take 1 tablet (40 mg) by mouth once daily.   Unknown    bisacodyl (Dulcolax, bisacodyl,) 10 mg suppository Insert 1 suppository (10 mg) into the rectum once daily as needed for constipation.   Unknown    calcium carbonate (CALCIUM 600 ORAL) Take 1 tablet by mouth once daily.   Unknown    carvedilol (Coreg) 12.5 mg tablet Take 1 tablet (12.5 mg) by mouth 2 times a day.   Unknown    cholecalciferol (Vitamin D3) 50 MCG (2000 UT) tablet Take 1 tablet  (50 mcg) by mouth once daily.   Unknown    docusate sodium (Colace) 100 mg capsule Take 1 capsule (100 mg) by mouth 2 times a day.   Unknown    DULoxetine (Cymbalta) 60 mg DR capsule Take 1 capsule (60 mg) by mouth once daily. Do not crush or chew.   Unknown    famotidine (Pepcid) 20 mg tablet Take 1 tablet (20 mg) by mouth once daily at bedtime.   Unknown    ferrous sulfate 325 (65 Fe) MG EC tablet Take 1 tablet by mouth 2 times a day. Do not crush, chew, or split.   Unknown    furosemide (Lasix) 20 mg tablet Take 1 tablet (20 mg) by mouth once daily.   Unknown    gabapentin (Neurontin) 800 mg tablet Take 1 tablet (800 mg) by mouth 3 times a day.   Unknown    glimepiride (Amaryl) 2 mg tablet Take 1 tablet (2 mg) by mouth once daily.   Unknown    lidocaine 4 % patch Place 1 patch on the skin once daily. Remove & discard patch within 12 hours or as directed by MD.Apply to back of neck topically one time a day and remove at night   Unknown    loperamide (Imodium A-D) 2 mg tablet Take 1 tablet (2 mg) by mouth every 4 hours if needed for diarrhea.   Unknown    loratadine (Claritin) 10 mg tablet Take 1 tablet (10 mg) by mouth once daily as needed for allergies.   Unknown    magnesium hydroxide (Milk of Magnesia) 400 mg/5 mL suspension Take 30 mL by mouth once daily as needed for constipation.   Unknown    menthol (Biofreeze, menthol,) 10 % cream Apply 1 Application topically every 6 hours if needed (for knee pain).   Unknown    metFORMIN (Glucophage) 1,000 mg tablet Take 1 tablet (1,000 mg) by mouth 2 times a day with meals.   Unknown    methyl salicylate-menthol (Muscle Rub) 15-10 % greaseless cream Apply 1 Application topically 4 times a day as needed for muscle/joint pain. Apply to left knee, joints   Unknown    naloxone HCl (NALOXONE NASL) Administer 1 Application into affected nostril(s) every 24 (twenty four) hours if needed (suspected overdose).   Unknown    nystatin (Mycostatin) 100,000 unit/gram powder Apply 1  Application topically 3 times a day. Apply to LLQ/Groin/Skin Folds topically every shift for excoriation for 14 days   Unknown    ondansetron (Zofran) 4 mg tablet Take 1 tablet (4 mg) by mouth every 4 hours if needed for nausea or vomiting.   Unknown    oxyCODONE (Roxicodone) 5 mg immediate release tablet Take 1 tablet (5 mg) by mouth 2 times a day.   Unknown    oxymetazoline (Afrin) 0.05 % nasal spray Administer 2 sprays into each nostril every 4 hours if needed for congestion (dry nose, epistaxis). Do not use for more than 3 days.   Unknown    polyethylene glycol (Glycolax, Miralax) 17 gram packet Take 17 g by mouth once daily.   Unknown    potassium chloride CR 20 mEq ER tablet Take 1 tablet (20 mEq) by mouth once daily. Do not crush or chew.   Unknown    propylene glycol (SYSTANE COMPLETE OPHT) Administer 1 drop into both eyes 3 times a day.   Unknown    sennosides (Senokot) 8.6 mg tablet Take 1 tablet (8.6 mg) by mouth 2 times a day.   Unknown    sodium phosphates (Fleet Enema) 19-7 gram/118 mL enema enema Insert 118 mL (1 enema) into the rectum once daily as needed for constipation. Use every 24 hours if no results from milk of magnesia   Unknown    tiZANidine (Zanaflex) 4 mg tablet Take 0.5 tablets (2 mg) by mouth every 8 hours if needed for muscle spasms.   Unknown        Current Hospital Medications    Current Facility-Administered Medications:     acetaminophen (Tylenol) tablet 650 mg, 650 mg, oral, q4h PRN **OR** [DISCONTINUED] acetaminophen (Tylenol) oral liquid 650 mg, 650 mg, nasogastric tube, q4h PRN **OR** [DISCONTINUED] acetaminophen (Tylenol) suppository 650 mg, 650 mg, rectal, q4h PRN, Enrique Guevara MD    [Held by provider] aspirin EC tablet 81 mg, 81 mg, oral, Nightly, Jeremiah Seay DO, 81 mg at 02/20/24 2044    [Held by provider] atorvastatin (Lipitor) tablet 40 mg, 40 mg, oral, Daily, Jeremiah Seay DO, 40 mg at 02/20/24 1142    dextrose 10 % in water (D10W) infusion, 0.3 g/kg/hr,  intravenous, Once PRN, Jeremiah Seay DO    dextrose 50 % injection 25 g, 25 g, intravenous, q15 min PRN, Jeremiah Seay DO    [Held by provider] DULoxetine (Cymbalta) DR capsule 60 mg, 60 mg, oral, Daily, Jeremiah Seay DO, 60 mg at 02/20/24 1143    fentanyl PCA 20 mcg/mL in NS opioid tolerant, , intravenous, Continuous, Jeremiah Seay DO, New Syringe/Cartridge at 02/23/24 1750    fentaNYL PF (Sublimaze) injection 50 mcg, 50 mcg, intravenous, q30 min PRN, Jeremiah Seay DO, 50 mcg at 02/23/24 2157    [Held by provider] gabapentin (Neurontin) capsule 300 mg, 300 mg, oral, TID, Jeremiah Seay DO, 300 mg at 02/20/24 2044    glucagon (Glucagen) injection 1 mg, 1 mg, intramuscular, q15 min PRN, Jeremiah Seay DO    haloperidol lactate (Haldol) injection 2 mg, 2 mg, intravenous, Once, Fortino Regalado MD    insulin lispro (HumaLOG) injection 0-5 Units, 0-5 Units, subcutaneous, TID with meals, Jeremiah Seay DO    lactated Ringer's infusion, 75 mL/hr, intravenous, Continuous, Jeremiah Seay DO, Last Rate: 75 mL/hr at 02/23/24 1900, 75 mL/hr at 02/23/24 1900    levoFLOXacin (Levaquin)  mg, 750 mg, intravenous, q24h, Jeremiah Seay DO, Stopped at 02/23/24 1144    lubricating eye drops ophthalmic solution 1 drop, 1 drop, Both Eyes, TID, Jeremiah Seay DO, 1 drop at 02/23/24 0900    naloxone (Narcan) injection 0.2 mg, 0.2 mg, intravenous, PRN, Jeremiah Seay DO    nystatin (Mycostatin) 100,000 unit/gram powder 1 Application, 1 Application, Topical, BID, Jeremiah Seay DO, 1 Application at 02/23/24 0827    [DISCONTINUED] ondansetron ODT (Zofran-ODT) disintegrating tablet 4 mg, 4 mg, oral, q8h PRN **OR** ondansetron (Zofran) injection 4 mg, 4 mg, intravenous, q8h PRN, Jeremiah Seay DO    oxygen (O2) therapy, , inhalation, Continuous PRN - O2/gases, Jeremiah Seay DO, 2 L/min at 02/23/24 0605    polyethylene glycol (Glycolax, Miralax) packet 17 g, 17 g, oral, Daily, Jeremiah Seay DO, 17 g at 02/19/24  "1556    sennosides (Senokot) tablet 8.6 mg, 1 tablet, oral, BID, Jeremiah Seay DO, 8.6 mg at 02/20/24 2044       Physical Exam  Physical Exam  Constitutional:       General: She is in acute distress.      Appearance: She is obese. She is ill-appearing.      Comments: She is sleeping.  Did not wake up when I called her name.  She is moaning in her sleep.   HENT:      Mouth/Throat:      Pharynx: Oropharynx is clear.   Eyes:      Pupils: Pupils are equal, round, and reactive to light.   Cardiovascular:      Rate and Rhythm: Normal rate and regular rhythm.      Heart sounds: Normal heart sounds.   Pulmonary:      Effort: No respiratory distress.      Breath sounds: Normal breath sounds. No wheezing or rhonchi.   Abdominal:      General: Abdomen is flat. Bowel sounds are normal. There is no distension.      Palpations: Abdomen is soft.      Tenderness: There is abdominal tenderness (Facial grimacing noted when palpating the right upper quadrant.).      Comments: Cholecystostomy tube in place with no leakage noted at the insertion site   Musculoskeletal:         General: No swelling.   Skin:     General: Skin is warm.   Neurological:      General: No focal deficit present.           Last Recorded Vitals  Blood pressure 146/85, pulse 86, temperature 36.5 °C (97.7 °F), resp. rate 23, height 1.499 m (4' 11\"), weight 98.2 kg (216 lb 7.9 oz), SpO2 94 %.    Relevant Results  Results for orders placed or performed during the hospital encounter of 02/19/24 (from the past 24 hour(s))   CBC   Result Value Ref Range    WBC 13.7 (H) 4.4 - 11.3 x10*3/uL    nRBC 0.0 0.0 - 0.0 /100 WBCs    RBC 3.00 (L) 4.00 - 5.20 x10*6/uL    Hemoglobin 9.2 (L) 12.0 - 16.0 g/dL    Hematocrit 28.3 (L) 36.0 - 46.0 %    MCV 94 80 - 100 fL    MCH 30.7 26.0 - 34.0 pg    MCHC 32.5 32.0 - 36.0 g/dL    RDW 13.6 11.5 - 14.5 %    Platelets 127 (L) 150 - 450 x10*3/uL   Green Top   Result Value Ref Range    Extra Tube Hold for add-ons.    Comprehensive metabolic " panel   Result Value Ref Range    Glucose 96 74 - 99 mg/dL    Sodium 143 136 - 145 mmol/L    Potassium 3.0 (L) 3.5 - 5.3 mmol/L    Chloride 113 (H) 98 - 107 mmol/L    Bicarbonate 24 21 - 32 mmol/L    Anion Gap 9 (L) 10 - 20 mmol/L    Urea Nitrogen 17 6 - 23 mg/dL    Creatinine 0.43 (L) 0.50 - 1.05 mg/dL    eGFR >90 >60 mL/min/1.73m*2    Calcium 7.8 (L) 8.6 - 10.3 mg/dL    Albumin 2.2 (L) 3.4 - 5.0 g/dL    Alkaline Phosphatase 84 33 - 136 U/L    Total Protein 4.6 (L) 6.4 - 8.2 g/dL    AST 15 9 - 39 U/L    Bilirubin, Total 1.9 (H) 0.0 - 1.2 mg/dL    ALT 12 7 - 45 U/L   TSH   Result Value Ref Range    Thyroid Stimulating Hormone 0.56 0.44 - 3.98 mIU/L   T4, free   Result Value Ref Range    Thyroxine, Free 0.88 0.61 - 1.12 ng/dL   Magnesium   Result Value Ref Range    Magnesium 1.47 (L) 1.60 - 2.40 mg/dL   Lipase   Result Value Ref Range    Lipase 16 9 - 82 U/L   POCT GLUCOSE   Result Value Ref Range    POCT Glucose 90 74 - 99 mg/dL   Ammonia   Result Value Ref Range    Ammonia 30 16 - 53 umol/L   Lactate   Result Value Ref Range    Lactate 0.7 0.4 - 2.0 mmol/L   SST TOP   Result Value Ref Range    Extra Tube Hold for add-ons.    BLOOD GAS ARTERIAL   Result Value Ref Range    POCT pH, Arterial 7.52 (H) 7.38 - 7.42 pH    POCT pCO2, Arterial 29 (L) 38 - 42 mm Hg    POCT pO2, Arterial 65 (L) 85 - 95 mm Hg    POCT SO2, Arterial 96 94 - 100 %    POCT Oxy Hemoglobin, Arterial 93.0 (L) 94.0 - 98.0 %    POCT Base Excess, Arterial 1.7 -2.0 - 3.0 mmol/L    POCT HCO3 Calculated, Arterial 23.7 22.0 - 26.0 mmol/L    Patient Temperature 37.0 degrees Celsius    FiO2 28 %    Site of Arterial Puncture Brachial Left     Chapin's Test Positive    POCT GLUCOSE   Result Value Ref Range    POCT Glucose 132 (H) 74 - 99 mg/dL   POCT GLUCOSE   Result Value Ref Range    POCT Glucose 142 (H) 74 - 99 mg/dL        Imaging  US guided abscess fluid collection drainage    Result Date: 2/23/2024  Interpreted By:  Jefe Barrera, STUDY: US GUIDED ABSCESS  FLUID COLLECTION DRAINAGE;  2/23/2024 1:36 pm   INDICATION: Signs/Symptoms:Cholecystitis.   COMPARISON: None.   ACCESSION NUMBER(S): [id number]   ORDERING CLINICIAN: FACUNDO NORIEGA   TECHNIQUE: Ultrasound guided  percutaneous cholecystotomy tube placement.   FINDINGS: The procedure and the benefits, risks and potential complications in addition to alternatives of the procedure were discussed with the patient by Dr. Barrera and signed informed consent was obtained.   Patient's vital signs were monitored by the radiology nurse.   Limited axial ultrasound images were obtained through the right upper quadrant. The images demonstrated  the gallbladder to be dilated with heterogeneous sludge and stones, similar to prior studies. The patient prepped in the usual sterile manner. 1% lidocaine was used for local anesthesia at the planned skin insertion site.   Under direct ultrasound guidance, a 8.5 Grenadian needle/catheter was placed into the gallbladder via an anterior percutaneous subhepatic approach. After confirmation of position of the needle within the fluid collection, the inner needle/stylette was withdrawn. After confirmation of position of the catheter within the collection, the guidewire was removed, the pigtail fixed and the catheter secured to the skin. The catheter was then set to drainage. 20 ML of  can itch brown purulent fluid was collected.   Postprocedure images demonstrated no evidence of hemorrhage.   Patient tolerated the procedure without immediate complication. Fluid was sent to the laboratory and cytology for further analysis.       Successful percutaneous cholecystotomy tube placement, as above.   Signed by: Jefe Barrera 2/23/2024 2:47 PM Dictation workstation:   [id number]    FL fluoro images no charge    Result Date: 2/23/2024  These images are not reportable by radiology and will not be interpreted by  Radiologists.          Assessment/Plan     68-year-old obese female, nonambulatory, with a  past medical history of hypertension, hyperlipidemia, obesity, GERD, left lower extremity DVT, anxiety, depression, diabetes mellitus, congestive heart failure, insomnia, TIA and overactive bladder who presented to the emergency room on 2/19/2024 for abdominal pain.  Associated with nausea and fever.  Workup showed an acute cholecystitis and a urinary tract infection/pyelonephritis associated with septic shock.  Imaging also showed a left-sided obstructive uropathy with a mild left-sided hydronephrosis and an 11 mm calculus at the proximal left ureter.  General surgery consulted.  Patient admitted to the medical service.  Started on IV antibiotics, and a cholecystostomy tube was placed instead of doing cholecystectomy because of the septic shock and the requirement of vasopressors.  After resolution of the septic shock, urology performed a cystoscopy with left-sided lithotripsy laser along with insertion of a ureteral stent.  Patient hospital course has been associated with pain.  That has been poorly controlled.  So on 2/23/2024, she was started on fentanyl infusion 25 mcg/hour with additional 50 mcg IV every 30 minutes as needed for breakthrough pain.  But yet despite giving the medication, patient still moaning and yelling.  Palliative care consulted for symptom management.    I did a thorough review of the patient's chart and her current clinical condition.  Despite optimizing pain control with the fentanyl, patient still moaning.  Yet in her sleep.  Therefore, and as diagnosis of exclusion, I think patient is having a form of delirium hyperactive form.  I will therefore give Haldol 2 mg IV 1 dose stat.  If no relief, then would give another 2 mg 30 minutes later.  And if still having the morning, then I would switch to the Ativan 2 mg IV 1 dose stat.  I will continue with the fentanyl infusion as this 25 mcg/hour with additional 50 mcg every 30 minutes as needed.  If patient's symptoms are not relieved, then  will discuss again with primary team/surgery team to see if we need to consider repeat imaging to rule out any underlying acute/active ongoing process.  We will have a repeat CBC done for tomorrow.  WBC count would give us still also an idea.  I did review the rest of the patient's medications.  And at the time being, I do not believe any of those medications are contributing to the delirium.  She was taking gabapentin but it is currently on hold since admission.  Also the Cymbalta has been on hold since admission.  As a diagnosis of exclusion, patient could be having withdrawal symptoms from those.    (This note was generated with voice recognition software and may contain errors including spelling, grammar, syntax and misrecognition of what was dictated, that are not fully corrected)     Fortino Regalado MD

## 2024-02-24 NOTE — PROGRESS NOTES
Physical Therapy                 Therapy Communication Note    Patient Name: Hannah Pedraza  MRN: [id number]  Today's Date: 2/24/2024     Discipline: Physical Therapy    Missed Visit Reason: Missed Visit Reason: Other (Comment) (Patient continues to not be appropriate for skilled PT intervention.  patient continues to not be alert and is moaning and crying out.  Will discontinue PT orders at this time.)  IF patient would become more appropriate to participate in skilled services will need new order at that time.  Patient is from [facility] facility and is wheelchair bound/doesn't ambulate at baseline.    Missed Time: Attempt    Comment:

## 2024-02-24 NOTE — PROGRESS NOTES
"Brianda Pedraza is a 68 y.o. female on day 5 of admission presenting with Septic shock (CMS/HCC).    Patient examined at bedside. She is delirious and unable to be redirected at the moment.    Overnight Events: None    Objective   Vital Signs:  Blood pressure 132/75, pulse 107, temperature 36.5 °C (97.7 °F), temperature source Temporal, resp. rate (!) 28, height 1.499 m (4' 11\"), weight 98.2 kg (216 lb 7.9 oz), SpO2 90 %.    Physical Exam  Constitutional:       General: She is in acute distress.      Appearance: She is ill-appearing.   Eyes:      Extraocular Movements: Extraocular movements intact.      Conjunctiva/sclera: Conjunctivae normal.   Cardiovascular:      Rate and Rhythm: Normal rate and regular rhythm.      Pulses: Normal pulses.      Heart sounds: Normal heart sounds.   Pulmonary:      Effort: Pulmonary effort is normal.      Breath sounds: Normal breath sounds.   Abdominal:      Palpations: Abdomen is soft.      Tenderness: There is abdominal tenderness.   Skin:     General: Skin is warm.   Neurological:      Mental Status: She is alert. She is disoriented.     Wt Readings from Last 6 Encounters:   02/23/24 98.2 kg (216 lb 7.9 oz)   10/11/23 83.1 kg (183 lb 3.2 oz)       I/Os    Intake/Output Summary (Last 24 hours) at 2/24/2024 0748  Last data filed at 2/24/2024 0700  Gross per 24 hour   Intake 2245.41 ml   Output 679 ml   Net 1566.41 ml       Labs:   Results for orders placed or performed during the hospital encounter of 02/19/24 (from the past 24 hour(s))   Ammonia   Result Value Ref Range    Ammonia 30 16 - 53 umol/L   Lactate   Result Value Ref Range    Lactate 0.7 0.4 - 2.0 mmol/L   SST TOP   Result Value Ref Range    Extra Tube Hold for add-ons.    BLOOD GAS ARTERIAL   Result Value Ref Range    POCT pH, Arterial 7.52 (H) 7.38 - 7.42 pH    POCT pCO2, Arterial 29 (L) 38 - 42 mm Hg    POCT pO2, Arterial 65 (L) 85 - 95 mm Hg    POCT SO2, Arterial 96 94 - 100 %    POCT Oxy Hemoglobin, " Arterial 93.0 (L) 94.0 - 98.0 %    POCT Base Excess, Arterial 1.7 -2.0 - 3.0 mmol/L    POCT HCO3 Calculated, Arterial 23.7 22.0 - 26.0 mmol/L    Patient Temperature 37.0 degrees Celsius    FiO2 28 %    Site of Arterial Puncture Brachial Left     Chapin's Test Positive    Sterile Fluid Culture/Smear    Specimen: Intra-abdominal Abscess; Fluid   Result Value Ref Range    Gram Stain (3+) Moderate Polymorphonuclear leukocytes (AA)     Gram Stain (1+) Rare Gram negative bacilli (AA)    POCT GLUCOSE   Result Value Ref Range    POCT Glucose 132 (H) 74 - 99 mg/dL   POCT GLUCOSE   Result Value Ref Range    POCT Glucose 142 (H) 74 - 99 mg/dL   CBC   Result Value Ref Range    WBC 9.5 4.4 - 11.3 x10*3/uL    nRBC 0.0 0.0 - 0.0 /100 WBCs    RBC 3.18 (L) 4.00 - 5.20 x10*6/uL    Hemoglobin 9.4 (L) 12.0 - 16.0 g/dL    Hematocrit 29.5 (L) 36.0 - 46.0 %    MCV 93 80 - 100 fL    MCH 29.6 26.0 - 34.0 pg    MCHC 31.9 (L) 32.0 - 36.0 g/dL    RDW 13.4 11.5 - 14.5 %    Platelets 168 150 - 450 x10*3/uL   Hepatic Function Panel   Result Value Ref Range    Albumin 2.4 (L) 3.4 - 5.0 g/dL    Bilirubin, Total 1.8 (H) 0.0 - 1.2 mg/dL    Bilirubin, Direct 0.8 (H) 0.0 - 0.3 mg/dL    Alkaline Phosphatase 91 33 - 136 U/L    ALT 11 7 - 45 U/L    AST 16 9 - 39 U/L    Total Protein 5.1 (L) 6.4 - 8.2 g/dL   Magnesium   Result Value Ref Range    Magnesium 1.65 1.60 - 2.40 mg/dL   Phosphorus   Result Value Ref Range    Phosphorus 2.8 2.5 - 4.9 mg/dL   Basic Metabolic Panel   Result Value Ref Range    Glucose 154 (H) 74 - 99 mg/dL    Sodium 144 136 - 145 mmol/L    Potassium 3.6 3.5 - 5.3 mmol/L    Chloride 113 (H) 98 - 107 mmol/L    Bicarbonate 22 21 - 32 mmol/L    Anion Gap 13 10 - 20 mmol/L    Urea Nitrogen 21 6 - 23 mg/dL    Creatinine 0.45 (L) 0.50 - 1.05 mg/dL    eGFR >90 >60 mL/min/1.73m*2    Calcium 8.1 (L) 8.6 - 10.3 mg/dL       Imaging:  US guided abscess fluid collection drainage    Result Date: 2/23/2024  Interpreted By:  Jefe Barrera, STUDY: US  GUIDED ABSCESS FLUID COLLECTION DRAINAGE;  2/23/2024 1:36 pm   INDICATION: Signs/Symptoms:Cholecystitis.   COMPARISON: None.   ACCESSION NUMBER(S): [id number]   ORDERING CLINICIAN: FACUNDO NORIEGA   TECHNIQUE: Ultrasound guided  percutaneous cholecystotomy tube placement.   FINDINGS: The procedure and the benefits, risks and potential complications in addition to alternatives of the procedure were discussed with the patient by Dr. Barrera and signed informed consent was obtained.   Patient's vital signs were monitored by the radiology nurse.   Limited axial ultrasound images were obtained through the right upper quadrant. The images demonstrated  the gallbladder to be dilated with heterogeneous sludge and stones, similar to prior studies. The patient prepped in the usual sterile manner. 1% lidocaine was used for local anesthesia at the planned skin insertion site.   Under direct ultrasound guidance, a 8.5 Mexican needle/catheter was placed into the gallbladder via an anterior percutaneous subhepatic approach. After confirmation of position of the needle within the fluid collection, the inner needle/stylette was withdrawn. After confirmation of position of the catheter within the collection, the guidewire was removed, the pigtail fixed and the catheter secured to the skin. The catheter was then set to drainage. 20 ML of  can itch brown purulent fluid was collected.   Postprocedure images demonstrated no evidence of hemorrhage.   Patient tolerated the procedure without immediate complication. Fluid was sent to the laboratory and cytology for further analysis.       Successful percutaneous cholecystotomy tube placement, as above.   Signed by: Jefe Barrera 2/23/2024 2:47 PM Dictation workstation:   [id number]    FL fluoro images no charge    Result Date: 2/23/2024  These images are not reportable by radiology and will not be interpreted by  Radiologists.      Medications:    Current Facility-Administered Medications:      acetaminophen (Tylenol) tablet 650 mg, 650 mg, oral, q4h PRN **OR** [DISCONTINUED] acetaminophen (Tylenol) oral liquid 650 mg, 650 mg, nasogastric tube, q4h PRN **OR** [DISCONTINUED] acetaminophen (Tylenol) suppository 650 mg, 650 mg, rectal, q4h PRN, Enrique Guevara MD    [Held by provider] aspirin EC tablet 81 mg, 81 mg, oral, Nightly, Jeremiah Seay DO, 81 mg at 02/20/24 2044    [Held by provider] atorvastatin (Lipitor) tablet 40 mg, 40 mg, oral, Daily, Jeremiah Seay DO, 40 mg at 02/20/24 1142    dexmedeTOMIDine in NS (Precedex) 400 mcg in 100 mL (4 mcg/mL) infusion, 0.2 mcg/kg/hr, intravenous, Continuous, Jeremiah Seay DO    dextrose 10 % in water (D10W) infusion, 0.3 g/kg/hr, intravenous, Once PRN, Jeremiah Seay DO    dextrose 50 % injection 25 g, 25 g, intravenous, q15 min PRN, Jeremiah Seay DO    [Held by provider] DULoxetine (Cymbalta) DR capsule 60 mg, 60 mg, oral, Daily, Jeremiah Seay DO, 60 mg at 02/20/24 1143    fentanyl PCA 20 mcg/mL in NS opioid tolerant, , intravenous, Continuous, Jeremiah Seay DO, New Syringe/Cartridge at 02/23/24 1750    fentaNYL PF (Sublimaze) injection 50 mcg, 50 mcg, intravenous, q30 min PRN, Jeremiah Seay DO, 50 mcg at 02/23/24 2157    [Held by provider] gabapentin (Neurontin) capsule 300 mg, 300 mg, oral, TID, Jeremiah Seay DO, 300 mg at 02/20/24 2044    glucagon (Glucagen) injection 1 mg, 1 mg, intramuscular, q15 min PRN, Jeremiah Seay DO    insulin lispro (HumaLOG) injection 0-5 Units, 0-5 Units, subcutaneous, TID with meals, Jeremiah Seay DO    lactated Ringer's infusion, 75 mL/hr, intravenous, Continuous, Jeremiah Seay DO, Last Rate: 75 mL/hr at 02/24/24 0345, 75 mL/hr at 02/24/24 0345    levoFLOXacin (Levaquin)  mg, 750 mg, intravenous, q24h, Jeremiah Seay DO, Stopped at 02/23/24 1144    lubricating eye drops ophthalmic solution 1 drop, 1 drop, Both Eyes, TID, Jeremiah Seay DO, 1 drop at 02/23/24 0900    naloxone (Narcan) injection 0.2  mg, 0.2 mg, intravenous, PRN, Jeremiah Seay DO    nystatin (Mycostatin) 100,000 unit/gram powder 1 Application, 1 Application, Topical, BID, Jeremiah Seay DO, 1 Application at 02/23/24 5348    [DISCONTINUED] ondansetron ODT (Zofran-ODT) disintegrating tablet 4 mg, 4 mg, oral, q8h PRN **OR** ondansetron (Zofran) injection 4 mg, 4 mg, intravenous, q8h PRN, Jeremiah Seay DO    oxygen (O2) therapy, , inhalation, Continuous PRN - O2/gases, Jeremiah Seay DO, Rate Verify at 02/24/24 0632    piperacillin-tazobactam-dextrose (Zosyn) IV 3.375 g, 3.375 g, intravenous, q8h, Enrique Guevara MD    polyethylene glycol (Glycolax, Miralax) packet 17 g, 17 g, oral, Daily, Jeremiah Seay DO, 17 g at 02/19/24 1556    sennosides (Senokot) tablet 8.6 mg, 1 tablet, oral, BID, Jeremiah Seay DO, 8.6 mg at 02/20/24 2044    Assessment & Plan    Hannah Pedraza is a 68 y.o. female on day 4 of admission to [facility] for management of acute cholecystitis & hydronephrosis 2/2 ureteral calculus      Acute cholecystitis s.p cholecystostomy  Hydronephrosis with urinary obstruction 2/2 ureteral calculus  Klebsiella pneumoniae bacteremia  Complicated UTI 2/2 Proteus Mirabilis & Klebsiella  Metabolic Encephalopathy  Hyperactive Delirium  Acute Postoperative Pain     Plan:  - She is s/p cystoscopy with lithotripsy & stent placement  - Per Dr. Balderas, cholecystostomy tube has purulent output (80cc)  - IV antibiotic therapy switched to Zosyn 4.5g q6h based on updated blood & urine cultures  - Pain control regimen: Fentanyl infusion, basal rate 25 mcg/hr + 50 mcg IVP q30min prn breakthrough & severe pain  - Continue LR @ 75 ml/hr  - Remains confused & delirious. Shouts out constantly, will continue to monitor mentation  - Start Precedex infusion @ 0.2 mcg/kg/hr. Titrate as needed  - Pain control regimen: Fentanyl infusion, basal rate 25 mcg/hr + 50 mcg IVP q30min prn breakthrough & severe pain     Disposition: Not medically ready for  discharge.     High level of MDM based on above medical conditions & discussion of plan.    Jeremiah Seay, DO  Internal Medicine

## 2024-02-24 NOTE — PROGRESS NOTES
"General Surgery Progress Note    S/p cystoscopy with stent placement and repeat cholecystostomy tube placement yesterday. Leukocytosis resolved. She is actually a little hypertensive this AM. Low grade tachycardia. Afebrile. Continues to moan / not follow commands. Evaluated overnight for possible delirium contributing to this.    BP (!) 156/101   Pulse 106   Temp 36.5 °C (97.7 °F) (Temporal)   Resp (!) 28   Ht 1.499 m (4' 11\")   Wt 98.2 kg (216 lb 7.9 oz)   SpO2 90%   BMI 43.73 kg/m²   NAD, laying in bed, eyes closed, will open to painful stimuli, quick withdrawal to painful stimuli, does not follow commands  No labored breathing, on 2L supplemental oxygen via nasal cannula  Low grade tachycardia  Abdomen non-distended, obese, tender although this is somewhat difficult to decipher as she is tender when touched anywhere, no peritonitis, cholecystostomy tube with sanguinopurulent but thin output, this flushes easily and holds accordion suction, about 20cc output overnight, no output from previous cholecystostomy tube removal site although wound protector still in place  Purewick in place  No significant peripheral edema, bruising on RUE and right thigh (has been there), SCDs in place    Intake/Output Summary (Last 24 hours) at 2/24/2024 0842  Last data filed at 2/24/2024 0700  Gross per 24 hour   Intake 1973.69 ml   Output 679 ml   Net 1294.69 ml     WBC 9.5, Hgb 9.4, Tbili 1.8 (from 1.9)  Gram negative bacilli on gram stain from cholecystostomy tube output  Urine and blood cultures from admission (2/19) with klebsiella, both susceptible to Zosyn    Patient is a 68 y.o. female admitted with sepsis. Now s/p cholecystostomy tube placement for acute cholecystitis, and s/p cystoscopy with lithotripsy and stent placement for a left ureteral stone and UTI. Continue antibiotics given low grade tachycardia and altered mental status, although she otherwise now has source control of infection. Continue cholecystostomy " tube, can flush once this evening to maintain tube patency. Once mental status improves, could start a low fat diet from surgical standpoint. Can resume anticoagulation (history of DVT). Remainder of care per primary team.    Kindra Balderas MD  02/24/24  9:05 AM           No

## 2024-02-24 NOTE — CARE PLAN
The patient's goals for the shift include      The clinical goals for the shift include Pt will not display signs of pain and will remain calm throughout the shift

## 2024-02-24 NOTE — PROGRESS NOTES
Pt reviewed during Care Rounds today and she is not ready for discharge; weekend discharge is not anticipated.  Updates attached in [facility] for [facility] where pt will return to long-term care. Care Transitions will continue to follow.       DEX Su

## 2024-02-24 NOTE — CARE PLAN
The clinical goals for the shift include Pt will not display signs of pain and will remain calm throughout the shift    Over the shift, the patient did not make progress toward the following goals. Barriers to progression include difficulty identifying effective medication regimen. Recommendations to address these barriers include implementing effective medication regimen for delirium.    Problem: Neuro/Coping  Goal: Minimal anxiety; utilize coping mechanisms  Outcome: Not Progressing  Goal: No signs of neurological compromise  Outcome: Not Progressing  Goal: Increase self care/family involvement  Outcome: Not Progressing     Pt yelling out and moaning throughout the shift. Fentanyl and haldol were ineffective in managing this. Ativan 2 mg was effective. Pt was quiet with eyes closed for approximately 6 hours.

## 2024-02-25 LAB
ALBUMIN SERPL BCP-MCNC: 2.3 G/DL (ref 3.4–5)
ALP SERPL-CCNC: 81 U/L (ref 33–136)
ALT SERPL W P-5'-P-CCNC: 10 U/L (ref 7–45)
ANION GAP SERPL CALC-SCNC: 11 MMOL/L (ref 10–20)
AST SERPL W P-5'-P-CCNC: 17 U/L (ref 9–39)
BILIRUB DIRECT SERPL-MCNC: 1.3 MG/DL (ref 0–0.3)
BILIRUB SERPL-MCNC: 2.4 MG/DL (ref 0–1.2)
BUN SERPL-MCNC: 25 MG/DL (ref 6–23)
CALCIUM SERPL-MCNC: 7.9 MG/DL (ref 8.6–10.3)
CHLORIDE SERPL-SCNC: 115 MMOL/L (ref 98–107)
CO2 SERPL-SCNC: 22 MMOL/L (ref 21–32)
CREAT SERPL-MCNC: 0.49 MG/DL (ref 0.5–1.05)
EGFRCR SERPLBLD CKD-EPI 2021: >90 ML/MIN/1.73M*2
ERYTHROCYTE [DISTWIDTH] IN BLOOD BY AUTOMATED COUNT: 13.9 % (ref 11.5–14.5)
GLUCOSE BLD MANUAL STRIP-MCNC: 108 MG/DL (ref 74–99)
GLUCOSE BLD MANUAL STRIP-MCNC: 172 MG/DL (ref 74–99)
GLUCOSE BLD MANUAL STRIP-MCNC: 85 MG/DL (ref 74–99)
GLUCOSE BLD MANUAL STRIP-MCNC: 87 MG/DL (ref 74–99)
GLUCOSE SERPL-MCNC: 210 MG/DL (ref 74–99)
HCT VFR BLD AUTO: 28.3 % (ref 36–46)
HGB BLD-MCNC: 9 G/DL (ref 12–16)
MAGNESIUM SERPL-MCNC: 1.7 MG/DL (ref 1.6–2.4)
MCH RBC QN AUTO: 30.5 PG (ref 26–34)
MCHC RBC AUTO-ENTMCNC: 31.8 G/DL (ref 32–36)
MCV RBC AUTO: 96 FL (ref 80–100)
NRBC BLD-RTO: 0 /100 WBCS (ref 0–0)
PHOSPHATE SERPL-MCNC: 2.9 MG/DL (ref 2.5–4.9)
PLATELET # BLD AUTO: 174 X10*3/UL (ref 150–450)
POTASSIUM SERPL-SCNC: 3.3 MMOL/L (ref 3.5–5.3)
PROT SERPL-MCNC: 4.9 G/DL (ref 6.4–8.2)
RBC # BLD AUTO: 2.95 X10*6/UL (ref 4–5.2)
SODIUM SERPL-SCNC: 145 MMOL/L (ref 136–145)
WBC # BLD AUTO: 7.3 X10*3/UL (ref 4.4–11.3)

## 2024-02-25 PROCEDURE — 94762 N-INVAS EAR/PLS OXIMTRY CONT: CPT

## 2024-02-25 PROCEDURE — 83735 ASSAY OF MAGNESIUM: CPT | Performed by: STUDENT IN AN ORGANIZED HEALTH CARE EDUCATION/TRAINING PROGRAM

## 2024-02-25 PROCEDURE — 82947 ASSAY GLUCOSE BLOOD QUANT: CPT

## 2024-02-25 PROCEDURE — 84460 ALANINE AMINO (ALT) (SGPT): CPT | Performed by: STUDENT IN AN ORGANIZED HEALTH CARE EDUCATION/TRAINING PROGRAM

## 2024-02-25 PROCEDURE — 2500000004 HC RX 250 GENERAL PHARMACY W/ HCPCS (ALT 636 FOR OP/ED): Performed by: STUDENT IN AN ORGANIZED HEALTH CARE EDUCATION/TRAINING PROGRAM

## 2024-02-25 PROCEDURE — 80048 BASIC METABOLIC PNL TOTAL CA: CPT | Performed by: STUDENT IN AN ORGANIZED HEALTH CARE EDUCATION/TRAINING PROGRAM

## 2024-02-25 PROCEDURE — 2020000001 HC ICU ROOM DAILY

## 2024-02-25 PROCEDURE — 2500000004 HC RX 250 GENERAL PHARMACY W/ HCPCS (ALT 636 FOR OP/ED): Performed by: HOSPITALIST

## 2024-02-25 PROCEDURE — 2500000004 HC RX 250 GENERAL PHARMACY W/ HCPCS (ALT 636 FOR OP/ED): Performed by: INTERNAL MEDICINE

## 2024-02-25 PROCEDURE — 84100 ASSAY OF PHOSPHORUS: CPT | Performed by: STUDENT IN AN ORGANIZED HEALTH CARE EDUCATION/TRAINING PROGRAM

## 2024-02-25 PROCEDURE — 99232 SBSQ HOSP IP/OBS MODERATE 35: CPT | Performed by: STUDENT IN AN ORGANIZED HEALTH CARE EDUCATION/TRAINING PROGRAM

## 2024-02-25 PROCEDURE — 99233 SBSQ HOSP IP/OBS HIGH 50: CPT | Performed by: SURGERY

## 2024-02-25 PROCEDURE — 36415 COLL VENOUS BLD VENIPUNCTURE: CPT | Performed by: STUDENT IN AN ORGANIZED HEALTH CARE EDUCATION/TRAINING PROGRAM

## 2024-02-25 PROCEDURE — 2500000001 HC RX 250 WO HCPCS SELF ADMINISTERED DRUGS (ALT 637 FOR MEDICARE OP): Performed by: STUDENT IN AN ORGANIZED HEALTH CARE EDUCATION/TRAINING PROGRAM

## 2024-02-25 PROCEDURE — 94660 CPAP INITIATION&MGMT: CPT

## 2024-02-25 PROCEDURE — 85027 COMPLETE CBC AUTOMATED: CPT | Performed by: STUDENT IN AN ORGANIZED HEALTH CARE EDUCATION/TRAINING PROGRAM

## 2024-02-25 RX ORDER — SODIUM CHLORIDE 9 MG/ML
100 INJECTION, SOLUTION INTRAVENOUS CONTINUOUS
Status: DISCONTINUED | OUTPATIENT
Start: 2024-02-25 | End: 2024-02-26 | Stop reason: HOSPADM

## 2024-02-25 RX ORDER — POTASSIUM CHLORIDE 14.9 MG/ML
20 INJECTION INTRAVENOUS ONCE
Status: COMPLETED | OUTPATIENT
Start: 2024-02-25 | End: 2024-02-25

## 2024-02-25 RX ORDER — HALOPERIDOL 5 MG/ML
2 INJECTION INTRAMUSCULAR ONCE
Status: COMPLETED | OUTPATIENT
Start: 2024-02-25 | End: 2024-02-25

## 2024-02-25 RX ADMIN — POLYVINYL ALCOHOL, POVIDONE 1 DROP: 14; 6 SOLUTION/ DROPS OPHTHALMIC at 20:28

## 2024-02-25 RX ADMIN — FENTANYL CITRATE 50 MCG: 50 INJECTION, SOLUTION INTRAMUSCULAR; INTRAVENOUS at 21:35

## 2024-02-25 RX ADMIN — PIPERACILLIN SODIUM AND TAZOBACTAM SODIUM 4.5 G: 4; .5 INJECTION, SOLUTION INTRAVENOUS at 09:28

## 2024-02-25 RX ADMIN — PIPERACILLIN SODIUM AND TAZOBACTAM SODIUM 4.5 G: 4; .5 INJECTION, SOLUTION INTRAVENOUS at 14:03

## 2024-02-25 RX ADMIN — FENTANYL CITRATE 50 MCG: 50 INJECTION, SOLUTION INTRAMUSCULAR; INTRAVENOUS at 16:25

## 2024-02-25 RX ADMIN — FENTANYL CITRATE 50 MCG: 50 INJECTION, SOLUTION INTRAMUSCULAR; INTRAVENOUS at 23:54

## 2024-02-25 RX ADMIN — FENTANYL CITRATE 50 MCG: 50 INJECTION, SOLUTION INTRAMUSCULAR; INTRAVENOUS at 01:32

## 2024-02-25 RX ADMIN — HALOPERIDOL LACTATE 2 MG: 5 INJECTION, SOLUTION INTRAMUSCULAR at 23:39

## 2024-02-25 RX ADMIN — PIPERACILLIN SODIUM AND TAZOBACTAM SODIUM 4.5 G: 4; .5 INJECTION, SOLUTION INTRAVENOUS at 02:31

## 2024-02-25 RX ADMIN — SODIUM CHLORIDE 100 ML/HR: 9 INJECTION, SOLUTION INTRAVENOUS at 02:10

## 2024-02-25 RX ADMIN — FENTANYL CITRATE 50 MCG: 50 INJECTION, SOLUTION INTRAMUSCULAR; INTRAVENOUS at 13:00

## 2024-02-25 RX ADMIN — FENTANYL CITRATE 50 MCG: 50 INJECTION, SOLUTION INTRAMUSCULAR; INTRAVENOUS at 22:16

## 2024-02-25 RX ADMIN — POLYVINYL ALCOHOL, POVIDONE 1 DROP: 14; 6 SOLUTION/ DROPS OPHTHALMIC at 09:30

## 2024-02-25 RX ADMIN — FENTANYL CITRATE 50 MCG: 50 INJECTION, SOLUTION INTRAMUSCULAR; INTRAVENOUS at 19:23

## 2024-02-25 RX ADMIN — FENTANYL CITRATE 50 MCG: 50 INJECTION, SOLUTION INTRAMUSCULAR; INTRAVENOUS at 09:47

## 2024-02-25 RX ADMIN — ENOXAPARIN SODIUM 40 MG: 40 INJECTION SUBCUTANEOUS at 20:28

## 2024-02-25 RX ADMIN — FENTANYL CITRATE 50 MCG: 50 INJECTION, SOLUTION INTRAMUSCULAR; INTRAVENOUS at 22:56

## 2024-02-25 RX ADMIN — POLYVINYL ALCOHOL, POVIDONE 1 DROP: 14; 6 SOLUTION/ DROPS OPHTHALMIC at 14:03

## 2024-02-25 RX ADMIN — POTASSIUM CHLORIDE 20 MEQ: 14.9 INJECTION, SOLUTION INTRAVENOUS at 09:39

## 2024-02-25 RX ADMIN — SODIUM CHLORIDE 100 ML/HR: 9 INJECTION, SOLUTION INTRAVENOUS at 12:23

## 2024-02-25 RX ADMIN — FENTANYL CITRATE 50 MCG: 50 INJECTION, SOLUTION INTRAMUSCULAR; INTRAVENOUS at 20:31

## 2024-02-25 RX ADMIN — SODIUM CHLORIDE 100 ML/HR: 9 INJECTION, SOLUTION INTRAVENOUS at 22:55

## 2024-02-25 RX ADMIN — ENOXAPARIN SODIUM 40 MG: 40 INJECTION SUBCUTANEOUS at 09:39

## 2024-02-25 RX ADMIN — DEXMEDETOMIDINE HYDROCHLORIDE 0.4 MCG/KG/HR: 400 INJECTION INTRAVENOUS at 06:34

## 2024-02-25 RX ADMIN — SODIUM CHLORIDE, SODIUM LACTATE, POTASSIUM CHLORIDE, CALCIUM CHLORIDE AND DEXTROSE MONOHYDRATE 110 ML/HR: 5; 600; 310; 30; 20 INJECTION, SOLUTION INTRAVENOUS at 08:31

## 2024-02-25 RX ADMIN — FENTANYL CITRATE: 0.05 INJECTION, SOLUTION INTRAMUSCULAR; INTRAVENOUS at 18:44

## 2024-02-25 RX ADMIN — FENTANYL CITRATE 50 MCG: 50 INJECTION, SOLUTION INTRAMUSCULAR; INTRAVENOUS at 14:00

## 2024-02-25 RX ADMIN — PIPERACILLIN SODIUM AND TAZOBACTAM SODIUM 4.5 G: 4; .5 INJECTION, SOLUTION INTRAVENOUS at 20:28

## 2024-02-25 RX ADMIN — FENTANYL CITRATE 50 MCG: 50 INJECTION, SOLUTION INTRAMUSCULAR; INTRAVENOUS at 11:02

## 2024-02-25 ASSESSMENT — PAIN - FUNCTIONAL ASSESSMENT
PAIN_FUNCTIONAL_ASSESSMENT: 0-10
PAIN_FUNCTIONAL_ASSESSMENT: CPOT (CRITICAL CARE PAIN OBSERVATION TOOL)
PAIN_FUNCTIONAL_ASSESSMENT: 0-10
PAIN_FUNCTIONAL_ASSESSMENT: CPOT (CRITICAL CARE PAIN OBSERVATION TOOL)
PAIN_FUNCTIONAL_ASSESSMENT: 0-10
PAIN_FUNCTIONAL_ASSESSMENT: 0-10
PAIN_FUNCTIONAL_ASSESSMENT: CPOT (CRITICAL CARE PAIN OBSERVATION TOOL)
PAIN_FUNCTIONAL_ASSESSMENT: 0-10
PAIN_FUNCTIONAL_ASSESSMENT: CPOT (CRITICAL CARE PAIN OBSERVATION TOOL)
PAIN_FUNCTIONAL_ASSESSMENT: CPOT (CRITICAL CARE PAIN OBSERVATION TOOL)
PAIN_FUNCTIONAL_ASSESSMENT: 0-10
PAIN_FUNCTIONAL_ASSESSMENT: CPOT (CRITICAL CARE PAIN OBSERVATION TOOL)
PAIN_FUNCTIONAL_ASSESSMENT: 0-10
PAIN_FUNCTIONAL_ASSESSMENT: 0-10
PAIN_FUNCTIONAL_ASSESSMENT: CPOT (CRITICAL CARE PAIN OBSERVATION TOOL)
PAIN_FUNCTIONAL_ASSESSMENT: 0-10

## 2024-02-25 ASSESSMENT — COGNITIVE AND FUNCTIONAL STATUS - GENERAL
WALKING IN HOSPITAL ROOM: TOTAL
MOVING FROM LYING ON BACK TO SITTING ON SIDE OF FLAT BED WITH BEDRAILS: TOTAL
DAILY ACTIVITIY SCORE: 8
DRESSING REGULAR LOWER BODY CLOTHING: TOTAL
TOILETING: TOTAL
TURNING FROM BACK TO SIDE WHILE IN FLAT BAD: TOTAL
EATING MEALS: A LITTLE
DRESSING REGULAR UPPER BODY CLOTHING: TOTAL
HELP NEEDED FOR BATHING: TOTAL
MOBILITY SCORE: 6
CLIMB 3 TO 5 STEPS WITH RAILING: TOTAL
PERSONAL GROOMING: TOTAL
STANDING UP FROM CHAIR USING ARMS: TOTAL
MOVING TO AND FROM BED TO CHAIR: TOTAL

## 2024-02-25 ASSESSMENT — PAIN SCALES - GENERAL
PAINLEVEL_OUTOF10: 0 - NO PAIN
PAINLEVEL_OUTOF10: 10 - WORST POSSIBLE PAIN
PAINLEVEL_OUTOF10: 2
PAINLEVEL_OUTOF10: 0 - NO PAIN
PAINLEVEL_OUTOF10: 10 - WORST POSSIBLE PAIN
PAINLEVEL_OUTOF10: 8
PAINLEVEL_OUTOF10: 0 - NO PAIN
PAINLEVEL_OUTOF10: 10 - WORST POSSIBLE PAIN
PAINLEVEL_OUTOF10: 10 - WORST POSSIBLE PAIN
PAINLEVEL_OUTOF10: 0 - NO PAIN
PAINLEVEL_OUTOF10: 6
PAINLEVEL_OUTOF10: 0 - NO PAIN
PAINLEVEL_OUTOF10: 8
PAINLEVEL_OUTOF10: 10 - WORST POSSIBLE PAIN
PAINLEVEL_OUTOF10: 10 - WORST POSSIBLE PAIN
PAINLEVEL_OUTOF10: 8
PAINLEVEL_OUTOF10: 8
PAINLEVEL_OUTOF10: 5 - MODERATE PAIN
PAINLEVEL_OUTOF10: 10 - WORST POSSIBLE PAIN
PAINLEVEL_OUTOF10: 2
PAINLEVEL_OUTOF10: 0 - NO PAIN
PAINLEVEL_OUTOF10: 0 - NO PAIN
PAINLEVEL_OUTOF10: 10 - WORST POSSIBLE PAIN
PAINLEVEL_OUTOF10: 0 - NO PAIN
PAINLEVEL_OUTOF10: 0 - NO PAIN
PAINLEVEL_OUTOF10: 6
PAINLEVEL_OUTOF10: 0 - NO PAIN
PAINLEVEL_OUTOF10: 4

## 2024-02-25 ASSESSMENT — PAIN DESCRIPTION - LOCATION
LOCATION: ABDOMEN
LOCATION: BACK
LOCATION: ABDOMEN

## 2024-02-25 ASSESSMENT — PAIN DESCRIPTION - ORIENTATION
ORIENTATION: RIGHT

## 2024-02-25 ASSESSMENT — PAIN SCALES - PAIN ASSESSMENT IN ADVANCED DEMENTIA (PAINAD)
TOTALSCORE: MEDICATION (SEE MAR);REPOSITIONED
TOTALSCORE: NO RELIEF

## 2024-02-25 NOTE — PROGRESS NOTES
"Brianda Pedraza is a 68 y.o. female on day 6 of admission presenting with Septic shock (CMS/HCC).    Patient doing better today, is now following commands and responding appropriately.     Overnight Events: None    Objective   Vital Signs:  Blood pressure 105/84, pulse 61, temperature 36.2 °C (97.2 °F), temperature source Temporal, resp. rate 17, height 1.499 m (4' 11\"), weight 98.3 kg (216 lb 11.4 oz), SpO2 95 %.    Physical Exam  Constitutional:       General: She is in mild acute distress.   Eyes:      Extraocular Movements: Extraocular movements intact.      Conjunctiva/sclera: Conjunctivae normal.   Cardiovascular:      Rate and Rhythm: Normal rate and regular rhythm.      Pulses: Normal pulses.      Heart sounds: Normal heart sounds.   Pulmonary:      Effort: Pulmonary effort is normal.      Breath sounds: Normal breath sounds.   Abdominal:      Palpations: Abdomen is soft.      Tenderness: There is abdominal tenderness.   Skin:     General: Skin is warm.   Neurological:      Mental Status: She is alert.     Wt Readings from Last 6 Encounters:   02/25/24 98.3 kg (216 lb 11.4 oz)   10/11/23 83.1 kg (183 lb 3.2 oz)       I/Os    Intake/Output Summary (Last 24 hours) at 2/25/2024 1216  Last data filed at 2/25/2024 1156  Gross per 24 hour   Intake 3943.9 ml   Output 896 ml   Net 3047.9 ml       Labs:   Results for orders placed or performed during the hospital encounter of 02/19/24 (from the past 24 hour(s))   POCT GLUCOSE   Result Value Ref Range    POCT Glucose 175 (H) 74 - 99 mg/dL   POCT GLUCOSE   Result Value Ref Range    POCT Glucose 200 (H) 74 - 99 mg/dL   CBC   Result Value Ref Range    WBC 7.3 4.4 - 11.3 x10*3/uL    nRBC 0.0 0.0 - 0.0 /100 WBCs    RBC 2.95 (L) 4.00 - 5.20 x10*6/uL    Hemoglobin 9.0 (L) 12.0 - 16.0 g/dL    Hematocrit 28.3 (L) 36.0 - 46.0 %    MCV 96 80 - 100 fL    MCH 30.5 26.0 - 34.0 pg    MCHC 31.8 (L) 32.0 - 36.0 g/dL    RDW 13.9 11.5 - 14.5 %    Platelets 174 150 - 450 " x10*3/uL   Hepatic Function Panel   Result Value Ref Range    Albumin 2.3 (L) 3.4 - 5.0 g/dL    Bilirubin, Total 2.4 (H) 0.0 - 1.2 mg/dL    Bilirubin, Direct 1.3 (H) 0.0 - 0.3 mg/dL    Alkaline Phosphatase 81 33 - 136 U/L    ALT 10 7 - 45 U/L    AST 17 9 - 39 U/L    Total Protein 4.9 (L) 6.4 - 8.2 g/dL   Magnesium   Result Value Ref Range    Magnesium 1.70 1.60 - 2.40 mg/dL   Phosphorus   Result Value Ref Range    Phosphorus 2.9 2.5 - 4.9 mg/dL   Basic Metabolic Panel   Result Value Ref Range    Glucose 210 (H) 74 - 99 mg/dL    Sodium 145 136 - 145 mmol/L    Potassium 3.3 (L) 3.5 - 5.3 mmol/L    Chloride 115 (H) 98 - 107 mmol/L    Bicarbonate 22 21 - 32 mmol/L    Anion Gap 11 10 - 20 mmol/L    Urea Nitrogen 25 (H) 6 - 23 mg/dL    Creatinine 0.49 (L) 0.50 - 1.05 mg/dL    eGFR >90 >60 mL/min/1.73m*2    Calcium 7.9 (L) 8.6 - 10.3 mg/dL   POCT GLUCOSE   Result Value Ref Range    POCT Glucose 172 (H) 74 - 99 mg/dL   POCT GLUCOSE   Result Value Ref Range    POCT Glucose 108 (H) 74 - 99 mg/dL       Imaging:  US guided abscess fluid collection drainage    Result Date: 2/23/2024  Interpreted By:  Jfee Barrera, STUDY: US GUIDED ABSCESS FLUID COLLECTION DRAINAGE;  2/23/2024 1:36 pm   INDICATION: Signs/Symptoms:Cholecystitis.   COMPARISON: None.   ACCESSION NUMBER(S): [id number]   ORDERING CLINICIAN: FACUNDO NORIEGA   TECHNIQUE: Ultrasound guided  percutaneous cholecystotomy tube placement.   FINDINGS: The procedure and the benefits, risks and potential complications in addition to alternatives of the procedure were discussed with the patient by Dr. Barrera and signed informed consent was obtained.   Patient's vital signs were monitored by the radiology nurse.   Limited axial ultrasound images were obtained through the right upper quadrant. The images demonstrated  the gallbladder to be dilated with heterogeneous sludge and stones, similar to prior studies. The patient prepped in the usual sterile manner. 1% lidocaine was used  for local anesthesia at the planned skin insertion site.   Under direct ultrasound guidance, a 8.5 Slovenian needle/catheter was placed into the gallbladder via an anterior percutaneous subhepatic approach. After confirmation of position of the needle within the fluid collection, the inner needle/stylette was withdrawn. After confirmation of position of the catheter within the collection, the guidewire was removed, the pigtail fixed and the catheter secured to the skin. The catheter was then set to drainage. 20 ML of  can itch brown purulent fluid was collected.   Postprocedure images demonstrated no evidence of hemorrhage.   Patient tolerated the procedure without immediate complication. Fluid was sent to the laboratory and cytology for further analysis.       Successful percutaneous cholecystotomy tube placement, as above.   Signed by: Jefe Barrera 2/23/2024 2:47 PM Dictation workstation:   [id number]      Medications:    Current Facility-Administered Medications:     acetaminophen (Tylenol) tablet 650 mg, 650 mg, oral, q4h PRN **OR** [DISCONTINUED] acetaminophen (Tylenol) oral liquid 650 mg, 650 mg, nasogastric tube, q4h PRN **OR** [DISCONTINUED] acetaminophen (Tylenol) suppository 650 mg, 650 mg, rectal, q4h PRN, Enrique Guevara MD    [Held by provider] aspirin EC tablet 81 mg, 81 mg, oral, Nightly, Jeremiah Seay DO, 81 mg at 02/20/24 2044    [Held by provider] atorvastatin (Lipitor) tablet 40 mg, 40 mg, oral, Daily, Jeremiah Seay DO, 40 mg at 02/20/24 1142    dextrose 10 % in water (D10W) infusion, 0.3 g/kg/hr, intravenous, Once PRN, Jeremiah Seya DO    dextrose 50 % injection 25 g, 25 g, intravenous, q15 min PRN, Jeremiah Seay DO    [Held by provider] DULoxetine (Cymbalta) DR capsule 60 mg, 60 mg, oral, Daily, Jeremiah Seay DO, 60 mg at 02/20/24 1143    enoxaparin (Lovenox) syringe 40 mg, 40 mg, subcutaneous, q12h REJI, Jeremiah Seay DO, 40 mg at 02/25/24 0939    fentanyl PCA 20 mcg/mL in NS opioid  tolerant, , intravenous, Continuous, Jeremiah Seay DO, Rate Verify at 02/25/24 1102    fentaNYL PF (Sublimaze) injection 50 mcg, 50 mcg, intravenous, q30 min PRN, Jeremiah Seay DO, 50 mcg at 02/25/24 1102    [Held by provider] gabapentin (Neurontin) capsule 300 mg, 300 mg, oral, TID, Jeremiah Seay DO, 300 mg at 02/20/24 2044    glucagon (Glucagen) injection 1 mg, 1 mg, intramuscular, q15 min PRN, Jeremiah Seay DO    insulin lispro (HumaLOG) injection 0-5 Units, 0-5 Units, subcutaneous, TID with meals, Jeremiah Seay DO, 1 Units at 02/24/24 1807    lubricating eye drops ophthalmic solution 1 drop, 1 drop, Both Eyes, TID, Jeremiah Seay DO, 1 drop at 02/25/24 0930    naloxone (Narcan) injection 0.2 mg, 0.2 mg, intravenous, PRN, Jeremiah Seay DO    nystatin (Mycostatin) 100,000 unit/gram powder 1 Application, 1 Application, Topical, BID, Jeremiah Seay DO, 1 Application at 02/24/24 2026    [DISCONTINUED] ondansetron ODT (Zofran-ODT) disintegrating tablet 4 mg, 4 mg, oral, q8h PRN **OR** ondansetron (Zofran) injection 4 mg, 4 mg, intravenous, q8h PRN, Jeremiah Seay DO    oxygen (O2) therapy, , inhalation, Continuous PRN - O2/gases, Jeremiah Seay DO, Rate Verify at 02/25/24 1202    piperacillin-tazobactam-dextrose (Zosyn) IV 4.5 g, 4.5 g, intravenous, q6h, Jeremiah Seay DO, Stopped at 02/25/24 0958    polyethylene glycol (Glycolax, Miralax) packet 17 g, 17 g, oral, Daily, Jeremiah Seay DO, 17 g at 02/19/24 1556    sennosides (Senokot) tablet 8.6 mg, 1 tablet, oral, BID, Jeremiah Seay DO, 8.6 mg at 02/20/24 2044    sodium chloride 0.9% infusion, 100 mL/hr, intravenous, Continuous, Enrique Guevara MD, Last Rate: 100 mL/hr at 02/25/24 1156, 100 mL/hr at 02/25/24 1156    Assessment & Plan    Hananh Pedraza is a 68 y.o. female on day 6 of admission to [facility] for management of acute cholecystitis & hydronephrosis 2/2 ureteral calculus      Acute cholecystitis s.p cholecystostomy  Hydronephrosis  with urinary obstruction 2/2 ureteral calculus  Klebsiella pneumoniae bacteremia  Complicated UTI 2/2 Proteus Mirabilis & Klebsiella  Metabolic Encephalopathy  Hyperactive Delirium  Acute Postoperative Pain     Plan:  - She is s/p cystoscopy with lithotripsy & stent placement  - Per Dr. Balderas, cholecystostomy tube has purulent output (80cc)  - IV antibiotic therapy switched to Zosyn 4.5g q6h based on updated blood & urine cultures  - Pain control regimen: Fentanyl infusion, basal rate 25 mcg/hr + 50 mcg IVP q30min prn breakthrough & severe pain  - Continue LR @ 75 ml/hr  - Mentation has improved significantly today. Precedex infusion discontinued  - Pain control regimen: Fentanyl infusion, basal rate 25 mcg/hr + 50 mcg IVP q30min prn breakthrough & severe pain  - Low fat diet   - Surgery following     Disposition: Not medically ready for discharge. Anticipate being able to downgrade to med/surg in 1-2 days.    Moderate Level of MDM based on above medical issues    Jeremiah Seay,   Internal Medicine
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
## 2024-02-25 NOTE — PROGRESS NOTES
"General Surgery Progress Note    Significantly improved mental status this AM. Follows verbal commands. Opening eyes spontaneously. Conversing. Multiple family members at bedside. Afebrile. Leukocytosis resolved. Cholecystostomy tube with minimal output, light serosanguinous, much less purulent.    /84   Pulse 61   Temp 36.2 °C (97.2 °F) (Temporal)   Resp 17   Ht 1.499 m (4' 11\")   Wt 98.3 kg (216 lb 11.4 oz)   SpO2 95%   BMI 43.77 kg/m²   NAD, laying supine in bed, converses appropriately, follows verbal commands  No labored breathing, on supplemental oxygen via nasal cannula  NSR, rate low 60's this AM  Abdomen soft, obese, I removed the stoma appliance that was serving as a wound manager at the site of previous cholecystostomy tube as there was essentially no output in the bag. Dry gauze dressing placed. Minimal bile staining of the skin underlying where this wound manager was. Cholecystostomy tube has light serosanguinous output, minimal, holding accordion suction, much less purulent. Minimally tender in RUQ, non-tender in lower abdomen.    Intake/Output Summary (Last 24 hours) at 2/25/2024 1203  Last data filed at 2/25/2024 1156  Gross per 24 hour   Intake 3943.9 ml   Output 896 ml   Net 3047.9 ml     WBC 7.3, Hgb 9, Tbili 2.4 (from 1.8), direct bili 1.3    Patient is a 68 y.o. female with cholecystitis, UTI, bacteremia. Clinically improving. Unclear etiology of increase in bilirubin, suspect from where she had some bile leaking at site of previous cholecystostomy tube. Biliary obstruction would remain in differential, but with her clinically improving otherwise, and abdominal exam improving, I think this is less likely. Furthermore, MRCP not available over the weekend at our facility. If bilirubin increases further tomorrow, could consider imaging. With improvement in mental status, I would be okay with starting a low fat when appetite returns. Rest of management per primary team.     Kindra " MD Andrey  02/25/24  12:11 PM

## 2024-02-25 NOTE — NURSING NOTE
Patient refusing to turn every 2 hours.  Requesting to only be turned to left side. Educated patient on importance of turning to prevent skin breakdown.  Patient verbalized understanding.

## 2024-02-25 NOTE — CARE PLAN
The patient's goals for the shift include      The clinical goals for the shift include Pt will remain comfortable throughout the shift

## 2024-02-26 VITALS
TEMPERATURE: 97.7 F | OXYGEN SATURATION: 92 % | SYSTOLIC BLOOD PRESSURE: 127 MMHG | HEIGHT: 59 IN | DIASTOLIC BLOOD PRESSURE: 83 MMHG | HEART RATE: 92 BPM | BODY MASS INDEX: 43.69 KG/M2 | WEIGHT: 216.71 LBS | RESPIRATION RATE: 21 BRPM

## 2024-02-26 PROBLEM — I10 PRIMARY HYPERTENSION: Status: RESOLVED | Noted: 2023-05-30 | Resolved: 2024-02-26

## 2024-02-26 LAB
ALBUMIN SERPL BCP-MCNC: 2.2 G/DL (ref 3.4–5)
ALP SERPL-CCNC: 82 U/L (ref 33–136)
ALT SERPL W P-5'-P-CCNC: 9 U/L (ref 7–45)
ANION GAP SERPL CALC-SCNC: 10 MMOL/L (ref 10–20)
AST SERPL W P-5'-P-CCNC: 18 U/L (ref 9–39)
BILIRUB DIRECT SERPL-MCNC: 1 MG/DL (ref 0–0.3)
BILIRUB SERPL-MCNC: 2.2 MG/DL (ref 0–1.2)
BUN SERPL-MCNC: 20 MG/DL (ref 6–23)
CALCIUM SERPL-MCNC: 7.4 MG/DL (ref 8.6–10.3)
CHLORIDE SERPL-SCNC: 113 MMOL/L (ref 98–107)
CO2 SERPL-SCNC: 22 MMOL/L (ref 21–32)
CREAT SERPL-MCNC: 0.53 MG/DL (ref 0.5–1.05)
EGFRCR SERPLBLD CKD-EPI 2021: >90 ML/MIN/1.73M*2
ERYTHROCYTE [DISTWIDTH] IN BLOOD BY AUTOMATED COUNT: 13.7 % (ref 11.5–14.5)
GLUCOSE BLD MANUAL STRIP-MCNC: 87 MG/DL (ref 74–99)
GLUCOSE BLD MANUAL STRIP-MCNC: 88 MG/DL (ref 74–99)
GLUCOSE BLD MANUAL STRIP-MCNC: 96 MG/DL (ref 74–99)
GLUCOSE SERPL-MCNC: 94 MG/DL (ref 74–99)
HCT VFR BLD AUTO: 30.3 % (ref 36–46)
HGB BLD-MCNC: 9.6 G/DL (ref 12–16)
MAGNESIUM SERPL-MCNC: 1.57 MG/DL (ref 1.6–2.4)
MCH RBC QN AUTO: 30.1 PG (ref 26–34)
MCHC RBC AUTO-ENTMCNC: 31.7 G/DL (ref 32–36)
MCV RBC AUTO: 95 FL (ref 80–100)
NRBC BLD-RTO: 0.3 /100 WBCS (ref 0–0)
PHOSPHATE SERPL-MCNC: 3 MG/DL (ref 2.5–4.9)
PLATELET # BLD AUTO: 240 X10*3/UL (ref 150–450)
POTASSIUM SERPL-SCNC: 3 MMOL/L (ref 3.5–5.3)
PROT SERPL-MCNC: 4.6 G/DL (ref 6.4–8.2)
RBC # BLD AUTO: 3.19 X10*6/UL (ref 4–5.2)
SODIUM SERPL-SCNC: 142 MMOL/L (ref 136–145)
WBC # BLD AUTO: 11.5 X10*3/UL (ref 4.4–11.3)

## 2024-02-26 PROCEDURE — 99233 SBSQ HOSP IP/OBS HIGH 50: CPT | Performed by: SURGERY

## 2024-02-26 PROCEDURE — 2500000004 HC RX 250 GENERAL PHARMACY W/ HCPCS (ALT 636 FOR OP/ED): Performed by: INTERNAL MEDICINE

## 2024-02-26 PROCEDURE — 94762 N-INVAS EAR/PLS OXIMTRY CONT: CPT

## 2024-02-26 PROCEDURE — 82947 ASSAY GLUCOSE BLOOD QUANT: CPT

## 2024-02-26 PROCEDURE — 84100 ASSAY OF PHOSPHORUS: CPT | Performed by: STUDENT IN AN ORGANIZED HEALTH CARE EDUCATION/TRAINING PROGRAM

## 2024-02-26 PROCEDURE — 99239 HOSP IP/OBS DSCHRG MGMT >30: CPT | Performed by: HOSPITALIST

## 2024-02-26 PROCEDURE — 82248 BILIRUBIN DIRECT: CPT | Performed by: STUDENT IN AN ORGANIZED HEALTH CARE EDUCATION/TRAINING PROGRAM

## 2024-02-26 PROCEDURE — 2500000001 HC RX 250 WO HCPCS SELF ADMINISTERED DRUGS (ALT 637 FOR MEDICARE OP): Performed by: STUDENT IN AN ORGANIZED HEALTH CARE EDUCATION/TRAINING PROGRAM

## 2024-02-26 PROCEDURE — 2500000004 HC RX 250 GENERAL PHARMACY W/ HCPCS (ALT 636 FOR OP/ED)

## 2024-02-26 PROCEDURE — 2500000004 HC RX 250 GENERAL PHARMACY W/ HCPCS (ALT 636 FOR OP/ED): Performed by: HOSPITALIST

## 2024-02-26 PROCEDURE — 2500000004 HC RX 250 GENERAL PHARMACY W/ HCPCS (ALT 636 FOR OP/ED): Performed by: STUDENT IN AN ORGANIZED HEALTH CARE EDUCATION/TRAINING PROGRAM

## 2024-02-26 PROCEDURE — 83735 ASSAY OF MAGNESIUM: CPT | Performed by: STUDENT IN AN ORGANIZED HEALTH CARE EDUCATION/TRAINING PROGRAM

## 2024-02-26 PROCEDURE — 85027 COMPLETE CBC AUTOMATED: CPT | Performed by: STUDENT IN AN ORGANIZED HEALTH CARE EDUCATION/TRAINING PROGRAM

## 2024-02-26 PROCEDURE — 36415 COLL VENOUS BLD VENIPUNCTURE: CPT | Performed by: STUDENT IN AN ORGANIZED HEALTH CARE EDUCATION/TRAINING PROGRAM

## 2024-02-26 RX ORDER — SODIUM CHLORIDE 5 %
1 OINTMENT (GRAM) OPHTHALMIC (EYE) 4 TIMES DAILY
Start: 2024-02-26

## 2024-02-26 RX ORDER — FENTANYL CITRATE-0.9 % NACL/PF 1100MCG/55
PATIENT CONTROLLED ANALGESIA SYRINGE INJECTION
Status: COMPLETED
Start: 2024-02-26 | End: 2024-02-26

## 2024-02-26 RX ORDER — LORAZEPAM 2 MG/ML
2 INJECTION INTRAMUSCULAR ONCE
Status: COMPLETED | OUTPATIENT
Start: 2024-02-26 | End: 2024-02-26

## 2024-02-26 RX ADMIN — LORAZEPAM 2 MG: 2 INJECTION INTRAMUSCULAR; INTRAVENOUS at 00:52

## 2024-02-26 RX ADMIN — FENTANYL CITRATE 50 MCG: 50 INJECTION, SOLUTION INTRAMUSCULAR; INTRAVENOUS at 20:35

## 2024-02-26 RX ADMIN — FENTANYL CITRATE 50 MCG: 50 INJECTION, SOLUTION INTRAMUSCULAR; INTRAVENOUS at 05:04

## 2024-02-26 RX ADMIN — SODIUM CHLORIDE 100 ML/HR: 9 INJECTION, SOLUTION INTRAVENOUS at 19:17

## 2024-02-26 RX ADMIN — FENTANYL CITRATE 50 MCG: 50 INJECTION, SOLUTION INTRAMUSCULAR; INTRAVENOUS at 08:00

## 2024-02-26 RX ADMIN — FENTANYL CITRATE 50 MCG: 50 INJECTION, SOLUTION INTRAMUSCULAR; INTRAVENOUS at 17:47

## 2024-02-26 RX ADMIN — POLYVINYL ALCOHOL, POVIDONE 1 DROP: 14; 6 SOLUTION/ DROPS OPHTHALMIC at 09:18

## 2024-02-26 RX ADMIN — ENOXAPARIN SODIUM 40 MG: 40 INJECTION SUBCUTANEOUS at 09:18

## 2024-02-26 RX ADMIN — FENTANYL CITRATE: 0.05 INJECTION, SOLUTION INTRAMUSCULAR; INTRAVENOUS at 06:01

## 2024-02-26 RX ADMIN — PIPERACILLIN SODIUM AND TAZOBACTAM SODIUM 4.5 G: 4; .5 INJECTION, SOLUTION INTRAVENOUS at 08:47

## 2024-02-26 RX ADMIN — FENTANYL CITRATE 50 MCG: 50 INJECTION, SOLUTION INTRAMUSCULAR; INTRAVENOUS at 03:13

## 2024-02-26 RX ADMIN — SODIUM CHLORIDE 100 ML/HR: 9 INJECTION, SOLUTION INTRAVENOUS at 09:16

## 2024-02-26 RX ADMIN — FENTANYL CITRATE 50 MCG: 50 INJECTION, SOLUTION INTRAMUSCULAR; INTRAVENOUS at 18:00

## 2024-02-26 RX ADMIN — PIPERACILLIN SODIUM AND TAZOBACTAM SODIUM 4.5 G: 4; .5 INJECTION, SOLUTION INTRAVENOUS at 02:12

## 2024-02-26 RX ADMIN — FENTANYL CITRATE 50 MCG: 50 INJECTION, SOLUTION INTRAMUSCULAR; INTRAVENOUS at 00:24

## 2024-02-26 RX ADMIN — NYSTATIN 1 APPLICATION: 100000 POWDER TOPICAL at 09:40

## 2024-02-26 RX ADMIN — PIPERACILLIN SODIUM AND TAZOBACTAM SODIUM 4.5 G: 4; .5 INJECTION, SOLUTION INTRAVENOUS at 20:01

## 2024-02-26 RX ADMIN — FENTANYL CITRATE: 0.05 INJECTION, SOLUTION INTRAMUSCULAR; INTRAVENOUS at 19:33

## 2024-02-26 RX ADMIN — FENTANYL CITRATE 50 MCG: 50 INJECTION, SOLUTION INTRAMUSCULAR; INTRAVENOUS at 17:06

## 2024-02-26 RX ADMIN — FENTANYL CITRATE 50 MCG: 50 INJECTION, SOLUTION INTRAMUSCULAR; INTRAVENOUS at 06:04

## 2024-02-26 RX ADMIN — PIPERACILLIN SODIUM AND TAZOBACTAM SODIUM 4.5 G: 4; .5 INJECTION, SOLUTION INTRAVENOUS at 14:00

## 2024-02-26 ASSESSMENT — COGNITIVE AND FUNCTIONAL STATUS - GENERAL
PERSONAL GROOMING: TOTAL
DRESSING REGULAR UPPER BODY CLOTHING: TOTAL
DRESSING REGULAR LOWER BODY CLOTHING: TOTAL
MOBILITY SCORE: 7
TOILETING: TOTAL
MOVING FROM LYING ON BACK TO SITTING ON SIDE OF FLAT BED WITH BEDRAILS: A LOT
STANDING UP FROM CHAIR USING ARMS: TOTAL
DAILY ACTIVITIY SCORE: 6
CLIMB 3 TO 5 STEPS WITH RAILING: TOTAL
EATING MEALS: TOTAL
HELP NEEDED FOR BATHING: TOTAL
MOVING TO AND FROM BED TO CHAIR: TOTAL
WALKING IN HOSPITAL ROOM: TOTAL
TURNING FROM BACK TO SIDE WHILE IN FLAT BAD: TOTAL

## 2024-02-26 ASSESSMENT — PAIN SCALES - WONG BAKER
WONGBAKER_NUMERICALRESPONSE: HURTS WHOLE LOT
WONGBAKER_NUMERICALRESPONSE: HURTS LITTLE MORE
WONGBAKER_NUMERICALRESPONSE: HURTS WHOLE LOT

## 2024-02-26 ASSESSMENT — PAIN SCALES - GENERAL
PAINLEVEL_OUTOF10: 6
PAINLEVEL_OUTOF10: 6
PAINLEVEL_OUTOF10: 0 - NO PAIN
PAINLEVEL_OUTOF10: 0 - NO PAIN
PAINLEVEL_OUTOF10: 4
PAINLEVEL_OUTOF10: 10 - WORST POSSIBLE PAIN
PAINLEVEL_OUTOF10: 0 - NO PAIN
PAINLEVEL_OUTOF10: 5 - MODERATE PAIN

## 2024-02-26 ASSESSMENT — PAIN - FUNCTIONAL ASSESSMENT
PAIN_FUNCTIONAL_ASSESSMENT: CPOT (CRITICAL CARE PAIN OBSERVATION TOOL)
PAIN_FUNCTIONAL_ASSESSMENT: 0-10
PAIN_FUNCTIONAL_ASSESSMENT: CPOT (CRITICAL CARE PAIN OBSERVATION TOOL)
PAIN_FUNCTIONAL_ASSESSMENT: 0-10
PAIN_FUNCTIONAL_ASSESSMENT: CPOT (CRITICAL CARE PAIN OBSERVATION TOOL)
PAIN_FUNCTIONAL_ASSESSMENT: CPOT (CRITICAL CARE PAIN OBSERVATION TOOL)

## 2024-02-26 NOTE — NURSING NOTE
Asked patient when she wanted to wear her CPAP, patient and family stated that she does not wear a CPAP and they were confused when asked that question on admission. Patient does not want to wear CPAP if she does not at home.     Bonnie Johnson, RRT  10:02 PM

## 2024-02-26 NOTE — SIGNIFICANT EVENT
"On shift signout, I was informed by my colleague that patient's daughter was not content with our services and that  we \"aren't adequately treating her mom's pain and calling it delirium\".  She had requested that patient be transferred to [facility].  Request order has been placed.  Then at around 10 PM, nurse informs me that patient is screaming again despite receiving 50 mcg fentanyl nearly every 30 minutes.  So I went ahead and placed an order for Haldol 2 mg IV stat.  1 hour later, nurse informed me that patient is still not comfortable.  I came in to check on the patient, she is currently resting with some periods of moaning.  But very mild.  Daughter at bedside.  Daughter again reiterated that she does not agree with my diagnosis of delirium.  She said that her mother is in pain.  I demonstrated understanding and talk to get more history about that.  I asked her about what pain did she voiced to her and the daughter said that her mom told her that she is having generalized pain.  I did explain to the daughter that she is on a relatively high dose of fentanyl and I do not feel comfortable giving any higher dosage especially that I still believe this is delirium more than I do believe this is fentanyl and approve of that is that when she was on Precedex yesterday, she was not moaning anymore.  But yet the fentanyl is not helping her with the morning.  Fentanyl is an opioid to treat pain while Precedex is a sedative.  Again daughter said that she does not agree was my diagnosis and she also said that I was supposed to reach out to her father and get the permission to give the Haldol before doing it.  I told her that I was not aware about this but I will make sure that the drug will not be ordered again and I again apologized that we are not meeting her expectations despite the efforts we are trying to do here.  But on the other hand, I was very clear with the daughter that I do not feel comfortable giving any " "further higher dosage of opioids especially that there is no clear explanation about patient's \" physical\" pain given her grossly vital signs, no fever, no leukocytosis, and a confirmation that the cholecystostomy tube in place.  I would consider starting the patient on IV Toradol to make sure that we are covering the inflammatory component of this acute cholecystitis but will need first approval from our general surgeon.  Tension started building up in the conversation and I can tell that the daughter is upset with her mom's condition and I again apologized for what her mom is going through and I did share with her that we are really trying our best to control the symptoms and the delirium.  Again daughter clearly said that she disagrees with me and that she does not believe this is delirium (despite telling me 2 days ago that her mother does get confused and agitated when she gets infections from the urine).  I informed bedside nurse about my discussion with the daughter.  I placed an order for Ativan 2 mg IV 1 dose stat but I also told the nurse that she would need the permission of the patient's daughter and maybe the spouse before administering the medication.        "

## 2024-02-26 NOTE — CARE PLAN
The patient's goals for the shift include      The clinical goals for the shift include Pt will report good pain control this shift    Pt remained comfortable. Awaiting transfer to [facility]

## 2024-02-26 NOTE — PROGRESS NOTES
"General Surgery Progress Note    Not quite as alert as yesterday. Opens eyes on command, but not conversing the way she was yesterday. No moaning / appeared comfortable. Cholecystostomy tube with 65cc output in last 24hrs. Afebrile. No tachycardia. WBC slightly up today at 11.5    /58 (BP Location: Right arm, Patient Position: Lying)   Pulse 83   Temp 36.1 °C (97 °F) (Temporal)   Resp 20   Ht 1.499 m (4' 11\")   Wt 98.3 kg (216 lb 11.4 oz)   SpO2 99%   BMI 43.77 kg/m²   NAD, laying supine in bed turned to left, opens eyes to command but not convering  No labored breathing, on supplemental oxygen  NSR, rate 80's  Abdomen soft, obese, minimally tender in RUQ, cholecystostomy tube with light serosanguinous output, holds accordion suction and flushes without resistance  Trace peripheral edema    Intake/Output Summary (Last 24 hours) at 2/26/2024 0811  Last data filed at 2/26/2024 0700  Gross per 24 hour   Intake 4396.1 ml   Output 873 ml   Net 3523.1 ml     WBC 11.5 from 7.3  Tbili stable, 2.2 today from 2.4 yesterday    Patient is a 68 y.o. female with admitted with sepsis with sources including cholecystitis, UTI, pneumonia. Continue cholecystostomy tube. If mental status decreasing, would keep NPO. Otherwise, low fat diet. Recommend continued antibiotics given mild increase in WBC this AM. Will continue to follow.    Kindra Balderas MD  02/26/24  8:15 AM              "

## 2024-02-26 NOTE — CARE PLAN
The clinical goals for the shift include Pt will report good pain control this shift    Over the shift, the patient appeared to worsen. Pt no longer is asking or answering questions and continues to moan. Fentanyl is effective for this but she required a lot of boluses throughout the night. Difficult to obtain pain self-report from patient. MD Fortino Regalado is aware and spoke with daughter at bedside. It might be beneficial to restart Precedex to manage the delirium.

## 2024-02-26 NOTE — NURSING NOTE
Patient's youngest daughter, Arely at nurse's desk, asking RN why she is not listed as emergency contact in patient's chart. RN checked chart and noted that patient's  and older daughter, Antonella are patient's emergency contacts. Per patient's Yuri harvey, emergency contacts should be left as they are currently with only  and daughter Antonella listed.

## 2024-02-26 NOTE — PROGRESS NOTES
Pt reviewed during Care Rounds today and she will be transferred to [facility] per family's request. Dr. Lea reports pt has been accepted, but now we are waiting on a bed. [facility] updated. Care Transitions to follow if needed.       DEX Su

## 2024-02-26 NOTE — PROGRESS NOTES
Occupational Therapy                 Therapy Communication Note    Patient Name: Hannah Pedraza  MRN: [id number]  Today's Date: 2/26/2024     Discipline: Occupational Therapy    Missed Visit Reason: Missed Visit Reason: Other (Comment) (per chart, pt will be transferred to [facility] per family's request. will hold OT eval. please notify department if plan changes)

## 2024-02-26 NOTE — DISCHARGE SUMMARY
Discharge Diagnosis  Septic shock (CMS/HCC),resolved  Acute cholecystitis s.p cholecystostomy tube placement.  Hydronephrosis with urinary obstruction 2/2 ureteral calculus s/p stent placement  Klebsiella pneumoniae bacteremia  Complicated UTI 2/2 Proteus Mirabilis & Klebsiella  Metabolic Encephalopathy  Hyperactive Delirium  Acute Postoperative Pain  Dry eyes    Discharge Meds     Your medication list        ASK your doctor about these medications        Instructions Last Dose Given Next Dose Due   acetaminophen 500 mg tablet  Commonly known as: Tylenol           albuterol 90 mcg/actuation inhaler           aspirin 81 mg capsule           atorvastatin 40 mg tablet  Commonly known as: Lipitor           Biofreeze (menthol) 10 % cream  Generic drug: menthol           CALCIUM 600 ORAL           carvedilol 12.5 mg tablet  Commonly known as: Coreg           docusate sodium 100 mg capsule  Commonly known as: Colace           Dulcolax (bisacodyl) 10 mg suppository  Generic drug: bisacodyl           DULoxetine 60 mg DR capsule  Commonly known as: Cymbalta           Eliquis 5 mg tablet  Generic drug: apixaban           famotidine 20 mg tablet  Commonly known as: Pepcid           ferrous sulfate 325 (65 Fe) MG EC tablet           Fleet Enema 19-7 gram/118 mL enema enema  Generic drug: sodium phosphates           furosemide 20 mg tablet  Commonly known as: Lasix           gabapentin 800 mg tablet  Commonly known as: Neurontin           glimepiride 2 mg tablet  Commonly known as: Amaryl           lidocaine 4 % patch           loperamide 2 mg tablet  Commonly known as: Imodium A-D           loratadine 10 mg tablet  Commonly known as: Claritin           magnesium hydroxide 400 mg/5 mL suspension  Commonly known as: Milk of Magnesia           metFORMIN 1,000 mg tablet  Commonly known as: Glucophage           Muscle Rub 15-10 % greaseless cream  Generic drug: methyl salicylate-menthol           NALOXONE NASL           nystatin  100,000 unit/gram powder  Commonly known as: Mycostatin           ondansetron 4 mg tablet  Commonly known as: Zofran           oxyCODONE 5 mg immediate release tablet  Commonly known as: Roxicodone           oxymetazoline 0.05 % nasal spray  Commonly known as: Afrin           polyethylene glycol 17 gram packet  Commonly known as: Glycolax, Miralax           potassium chloride CR 20 mEq ER tablet  Commonly known as: Klor-Con M20           sennosides 8.6 mg tablet  Commonly known as: Senokot           SYSTANE COMPLETE OPHT           tiZANidine 4 mg tablet  Commonly known as: Zanaflex           Vitamin D3 50 MCG (2000 UT) tablet  Generic drug: cholecalciferol  Ask about: Which instructions should I use?                    Test Results Pending At Discharge  Pending Labs       Order Current Status    Blood Culture Preliminary result    Sterile Fluid Culture/Smear Preliminary result            Hospital Course   68 y.o. female presenting with abdominal pain, nausea as well fever.  Symptoms started 1 day prior and patient is very confused.  CT abdomen pelvis as well ultrasound showing possible acute cholecystitis besides urinary tract infection evident on urinalysis as well.  Patient denies any shortness of breath or cough or hemoptysis or wheeze or chest pain.  No active bleeding.  She has lactic acidosis with hypotension, PAOLA, dehydration with elevated creatinine, tachycardia.  She was given 3 L of normal saline in the ER without significant response and hence on Levophed found to be in septic shock.  She has left-sided renal calculus 3 mm and no hydronephrosis.  Surgery and IR consulted.  She is on IV antibiotics and sepsis protocol had been initiated.  No skin rash apparently or joint pains or muscle aches or bodyaches.  Patient unable to give much history with her mentation.  Cannot tell whether she had back pain or flank pain or hematuria or dysuria.  She follows commands but somewhat lethargic with metabolic  encephalopathy currently maintaining vital saturations   Clinically improving. Unclear etiology of increase in bilirubin, suspect from where she had some bile leaking at site of previous cholecystostomy tube. Biliary obstruction would remain in differential, but with her clinically improving otherwise, and abdominal exam improving, I think this is less likely. Furthermore, MRCP not available over the weekend at our facility.  During hospitalization also has been requiring more and more pain medications, morning.  She was getting higher doses of fentanyl during hospitalization along with morphine.  At this point it was decided that her morning is pain related along with her delirious.  Patient was given Haldol one-time dose through the night.  Was on Ativan as needed.  Patient resting comfortably now.    Patient was getting fentanyl infusion basal rate 25 mcg/h +50 mcg IV every 30 minutes for breakthrough.  Patient also on IV Zosyn 4.5 g every 6 hours based on her updated blood and urine cultures.  Patient is s/p cystoscopy with LAT autopsy and stent placement by Dr. Garcia.  Family requested transfer to [place].  Patient accepted at [facility].  Patient transferred today.  I have discussed the case in detail with the family including  and daughter and sister of the patient.  All agreeable with the current care plan.    Pertinent Physical Exam At Time of Discharge  Physical Exam  Constitutional:       General: She is resting comfortably.    Eyes:      Extraocular Movements: Extraocular movements intact.      Conjunctiva/sclera: Conjunctivae normal.   Cardiovascular:      Rate and Rhythm: Normal rate and regular rhythm.      Pulses: Normal pulses.      Heart sounds: Normal heart sounds.   Pulmonary:      Effort: Pulmonary effort is normal.      Breath sounds: Normal breath sounds.   Abdominal:      Palpations: Abdomen is soft.      Tenderness: There is abdominal tenderness.   Skin:      General: Skin is warm.   Neurological:      Mental Status: She is resting comfortably.  Outpatient Follow-Up    Transferred to [facility] per family request    Total time spent 35 minutes.    Reta Lea MD

## 2024-02-27 LAB
BACTERIA FLD CULT: ABNORMAL
GRAM STN SPEC: ABNORMAL
GRAM STN SPEC: ABNORMAL

## 2024-02-29 LAB — BACTERIA BLD CULT: NORMAL

## 2024-03-17 ENCOUNTER — NURSING HOME VISIT (OUTPATIENT)
Dept: POST ACUTE CARE | Facility: EXTERNAL LOCATION | Age: 69
End: 2024-03-17
Payer: COMMERCIAL

## 2024-03-17 DIAGNOSIS — E11.65 TYPE 2 DIABETES MELLITUS WITH HYPERGLYCEMIA, WITHOUT LONG-TERM CURRENT USE OF INSULIN (MULTI): ICD-10-CM

## 2024-03-17 DIAGNOSIS — K81.9 CHOLECYSTITIS: Primary | ICD-10-CM

## 2024-03-17 PROCEDURE — 99304 1ST NF CARE SF/LOW MDM 25: CPT | Performed by: INTERNAL MEDICINE

## 2024-03-17 NOTE — LETTER
Patient: Hannah Pedraza  : 1955    Encounter Date: 2024    Pt was seen in the NH.  READMIT, PMH DM2 DVT, HERE AFTER ACUTE CHOLECYSTITIS AND DRAIN TUBE PLACEMENT FOR SURGICAL REMOVAL OF GB IN FUTURE  Pt is in usual state , no complaint  General appearance: Comfortable, no distress  ROS: No SOB  Medications reviewed  Head: Normal  Neck: Soft  Heart: Regular  Lungs: Clear  Abdomen: soft, DRAIN TUBES IN PLACE    Impression: clinically doing fine, SUPPORTIVE CARE, REHAB, TO SEE THE SURGEON AND CHOLECYSTECTOMY As PER SURGEON DISCRETION, continue current management    Problem List Items Addressed This Visit       Type 2 diabetes mellitus with hyperglycemia, without long-term current use of insulin (CMS/[facility])     Other Visit Diagnoses       Cholecystitis    -  Primary               Electronically Signed By: Josue Lock MD   3/17/24  3:11 PM

## 2024-03-17 NOTE — PROGRESS NOTES
Pt was seen in the NH.  YURY, PMH DM2 DVT, HERE AFTER ACUTE CHOLECYSTITIS AND DRAIN TUBE PLACEMENT FOR SURGICAL REMOVAL OF GB IN FUTURE  Pt is in usual state , no complaint  General appearance: Comfortable, no distress  ROS: No SOB  Medications reviewed  Head: Normal  Neck: Soft  Heart: Regular  Lungs: Clear  Abdomen: soft, DRAIN TUBES IN PLACE    Impression: clinically doing fine, SUPPORTIVE CARE, REHAB, TO SEE THE SURGEON AND CHOLECYSTECTOMY As PER SURGEON DISCRETION, continue current management    Problem List Items Addressed This Visit       Type 2 diabetes mellitus with hyperglycemia, without long-term current use of insulin (CMS/[facility])     Other Visit Diagnoses       Cholecystitis    -  Primary

## 2024-03-19 DIAGNOSIS — N20.1 CALCULUS OF URETER: Primary | ICD-10-CM

## 2024-03-28 ENCOUNTER — APPOINTMENT (OUTPATIENT)
Dept: UROLOGY | Facility: CLINIC | Age: 69
End: 2024-03-28
Payer: COMMERCIAL

## 2024-04-21 ENCOUNTER — NURSING HOME VISIT (OUTPATIENT)
Dept: POST ACUTE CARE | Facility: EXTERNAL LOCATION | Age: 69
End: 2024-04-21
Payer: COMMERCIAL

## 2024-04-21 DIAGNOSIS — N13.2 HYDRONEPHROSIS WITH URINARY OBSTRUCTION DUE TO URETERAL CALCULUS: ICD-10-CM

## 2024-04-21 DIAGNOSIS — R57.9 SHOCK, UNSPECIFIED (MULTI): Primary | ICD-10-CM

## 2024-04-21 PROCEDURE — 99304 1ST NF CARE SF/LOW MDM 25: CPT | Performed by: INTERNAL MEDICINE

## 2024-04-21 NOTE — LETTER
Patient: Hannah Pedraza  : 1955    Encounter Date: 2024    Pt was seen in the NH.  69 yo f with PMH HTN Fatty liver DM2 DVT readmitted after septic shock feels better now, for stent removal next week  Pt is in usual state , no complaint  General appearance: Comfortable, no distress  ROS: No SOB  Medications reviewed  Head: Normal  Neck: Soft  Heart: Regular  Lungs: Clear  Abdomen: soft, drain tube (GB) in place    Plan:   1)clinically doing fine, rehab, supportive care  2)For stent removal next week, to cont. Oxycodone prn for pain    Problem List Items Addressed This Visit       Hydronephrosis with urinary obstruction due to ureteral calculus     Other Visit Diagnoses       Shock, unspecified (Multi)    -  Primary               Electronically Signed By: Josue Lock MD   24  7:14 PM

## 2024-04-21 NOTE — PROGRESS NOTES
Pt was seen in the NH.  69 yo f with PMH HTN Fatty liver DM2 DVT readmitted after septic shock feels better now, for stent removal next week  Pt is in usual state , no complaint  General appearance: Comfortable, no distress  ROS: No SOB  Medications reviewed  Head: Normal  Neck: Soft  Heart: Regular  Lungs: Clear  Abdomen: soft, drain tube (GB) in place    Plan:   1)clinically doing fine, rehab, supportive care  2)For stent removal next week, to cont. Oxycodone prn for pain    Problem List Items Addressed This Visit       Hydronephrosis with urinary obstruction due to ureteral calculus     Other Visit Diagnoses       Shock, unspecified (Multi)    -  Primary

## 2024-04-30 ENCOUNTER — NURSING HOME VISIT (OUTPATIENT)
Dept: POST ACUTE CARE | Facility: EXTERNAL LOCATION | Age: 69
End: 2024-04-30
Payer: COMMERCIAL

## 2024-04-30 DIAGNOSIS — M54.50 CHRONIC BILATERAL LOW BACK PAIN WITHOUT SCIATICA: Primary | ICD-10-CM

## 2024-04-30 DIAGNOSIS — G89.29 CHRONIC BILATERAL LOW BACK PAIN WITHOUT SCIATICA: Primary | ICD-10-CM

## 2024-04-30 PROCEDURE — 99308 SBSQ NF CARE LOW MDM 20: CPT | Performed by: INTERNAL MEDICINE

## 2024-04-30 NOTE — PROGRESS NOTES
Pt was seen in the NH.  Pt co low back pain  General appearance: Comfortable, no distress  ROS: No SOB  Medications reviewed  Head: Normal  Neck: Soft  Heart: Regular  Lungs: Clear  Abdomen: soft  Back tenderness lower back , no rash    Plan:   1)clinically doing fine  2) Prednisone 10 mg tid x 5 days , Ls spine xray    Problem List Items Addressed This Visit    None  Visit Diagnoses       Chronic bilateral low back pain without sciatica    -  Primary

## 2024-04-30 NOTE — LETTER
Patient: Hannah Pedraza  : 1955    Encounter Date: 2024    Pt was seen in the NH.  Pt co low back pain  General appearance: Comfortable, no distress  ROS: No SOB  Medications reviewed  Head: Normal  Neck: Soft  Heart: Regular  Lungs: Clear  Abdomen: soft  Back tenderness lower back , no rash    Plan:   1)clinically doing fine  2) Prednisone 10 mg tid x 5 days , Ls spine xray    Problem List Items Addressed This Visit    None  Visit Diagnoses       Chronic bilateral low back pain without sciatica    -  Primary               Electronically Signed By: Josue Lock MD   24  6:04 PM

## 2024-05-28 ENCOUNTER — NURSING HOME VISIT (OUTPATIENT)
Dept: POST ACUTE CARE | Facility: EXTERNAL LOCATION | Age: 69
End: 2024-05-28
Payer: COMMERCIAL

## 2024-05-28 DIAGNOSIS — J44.9 CHRONIC OBSTRUCTIVE PULMONARY DISEASE, UNSPECIFIED COPD TYPE (MULTI): Primary | ICD-10-CM

## 2024-05-28 PROCEDURE — 99308 SBSQ NF CARE LOW MDM 20: CPT | Performed by: INTERNAL MEDICINE

## 2024-05-28 NOTE — LETTER
Patient: Hannah Pedraza  : 1955    Encounter Date: 2024    Pt was seen in the NH.  Pt is in usual state , no complaint  General appearance: Comfortable, no distress  ROS: No SOB  Medications reviewed  Head: Normal  Neck: Soft  Heart: Regular  Lungs: Clear  Abdomen: soft, cholecystomy tubes removed    Plan:   1)clinically doing fine  2) To continue albuterol budesonide 2 puff q 4 hr prn    Problem List Items Addressed This Visit    None  Visit Diagnoses       Chronic obstructive pulmonary disease, unspecified COPD type (Multi)    -  Primary               Electronically Signed By: Josue Lock MD   24  6:11 PM

## 2024-05-28 NOTE — PROGRESS NOTES
Pt was seen in the NH.  Pt is in usual state , no complaint  General appearance: Comfortable, no distress  ROS: No SOB  Medications reviewed  Head: Normal  Neck: Soft  Heart: Regular  Lungs: Clear  Abdomen: soft, cholecystomy tubes removed    Plan:   1)clinically doing fine  2) To continue albuterol budesonide 2 puff q 4 hr prn    Problem List Items Addressed This Visit    None  Visit Diagnoses       Chronic obstructive pulmonary disease, unspecified COPD type (Multi)    -  Primary

## 2024-06-25 ENCOUNTER — NURSING HOME VISIT (OUTPATIENT)
Dept: POST ACUTE CARE | Facility: EXTERNAL LOCATION | Age: 69
End: 2024-06-25
Payer: COMMERCIAL

## 2024-06-25 DIAGNOSIS — K65.1: Primary | ICD-10-CM

## 2024-06-25 PROCEDURE — 99308 SBSQ NF CARE LOW MDM 20: CPT | Performed by: INTERNAL MEDICINE

## 2024-06-25 NOTE — LETTER
Patient: Hannah Pedraza  : 1955    Encounter Date: 2024    Pt was seen in the NH.  Pt CO PAIN RUQ  General appearance: Comfortable, no distress  ROS: No SOB  Medications reviewed  Head: Normal  Neck: Soft  Heart: Regular  Lungs: Clear  Abdomen: ABSCESS WITH SOME DISCHARGE RUQ    Plan:   SENT TO ER FOR EVAL    Problem List Items Addressed This Visit    None         Electronically Signed By: Josue Lock MD   24  7:27 PM

## 2024-06-25 NOTE — PROGRESS NOTES
Pt was seen in the NH.  Pt CO PAIN RUQ  General appearance: Comfortable, no distress  ROS: No SOB  Medications reviewed  Head: Normal  Neck: Soft  Heart: Regular  Lungs: Clear  Abdomen: ABSCESS WITH SOME DISCHARGE RUQ    Plan:   SENT TO ER FOR EVAL    Problem List Items Addressed This Visit    None

## 2024-07-30 ENCOUNTER — NURSING HOME VISIT (OUTPATIENT)
Dept: POST ACUTE CARE | Facility: EXTERNAL LOCATION | Age: 69
End: 2024-07-30
Payer: COMMERCIAL

## 2024-07-30 DIAGNOSIS — E78.00 HYPERCHOLESTEROLEMIA: Primary | ICD-10-CM

## 2024-07-30 PROCEDURE — 99308 SBSQ NF CARE LOW MDM 20: CPT | Performed by: INTERNAL MEDICINE

## 2024-07-30 NOTE — PROGRESS NOTES
Pt was seen in the NH.  Pt is in usual state , no complaint  General appearance: Comfortable, no distress  ROS: No SOB  Medications reviewed  Head: Normal  Neck: Soft  Heart: Regular  Lungs: Clear  Abdomen: soft    Plan:   1)clinically doing fine  2) To continue lipitor 40 mg daily    Problem List Items Addressed This Visit       Hypercholesterolemia - Primary

## 2024-07-30 NOTE — LETTER
Patient: Hannah Pedraza  : 1955    Encounter Date: 2024    Pt was seen in the NH.  Pt is in usual state , no complaint  General appearance: Comfortable, no distress  ROS: No SOB  Medications reviewed  Head: Normal  Neck: Soft  Heart: Regular  Lungs: Clear  Abdomen: soft    Plan:   1)clinically doing fine  2) To continue lipitor 40 mg daily    Problem List Items Addressed This Visit       Hypercholesterolemia - Primary          Electronically Signed By: Josue Lock MD   24  6:05 PM

## 2024-08-22 ENCOUNTER — NURSING HOME VISIT (OUTPATIENT)
Dept: POST ACUTE CARE | Facility: EXTERNAL LOCATION | Age: 69
End: 2024-08-22
Payer: COMMERCIAL

## 2024-08-22 DIAGNOSIS — L29.9 ITCH: Primary | ICD-10-CM

## 2024-08-22 PROCEDURE — 99308 SBSQ NF CARE LOW MDM 20: CPT | Performed by: INTERNAL MEDICINE

## 2024-08-22 NOTE — LETTER
Patient: Hannah Pedraza  : 1955    Encounter Date: 2024    Pt was seen in the NH.  Pt c/o itch on abdomen and back no rash off and on  General appearance: Comfortable, no distress  ROS: No SOB  Medications reviewed  Head: Normal  Neck: Soft  Heart: Regular  Lungs: Clear  Abdomen: soft  Skin no rash    Plan:     Hydrocortisone cream bid prn    Problem List Items Addressed This Visit    None  Visit Diagnoses       Itch    -  Primary               Electronically Signed By: Josue Lock MD   24  8:57 PM

## 2024-08-23 NOTE — PROGRESS NOTES
Pt was seen in the NH.  Pt c/o itch on abdomen and back no rash off and on  General appearance: Comfortable, no distress  ROS: No SOB  Medications reviewed  Head: Normal  Neck: Soft  Heart: Regular  Lungs: Clear  Abdomen: soft  Skin no rash    Plan:     Hydrocortisone cream bid prn    Problem List Items Addressed This Visit    None  Visit Diagnoses       Itch    -  Primary

## 2024-09-15 ENCOUNTER — NURSING HOME VISIT (OUTPATIENT)
Dept: POST ACUTE CARE | Facility: EXTERNAL LOCATION | Age: 69
End: 2024-09-15
Payer: COMMERCIAL

## 2024-09-15 DIAGNOSIS — K81.1 CHRONIC CHOLECYSTITIS: Primary | ICD-10-CM

## 2024-09-15 PROCEDURE — 99304 1ST NF CARE SF/LOW MDM 25: CPT | Performed by: INTERNAL MEDICINE

## 2024-09-15 NOTE — LETTER
Patient: Hannah Pedraza  : 1955    Encounter Date: 09/15/2024    Pt was seen in the NH.  68 YO F WITH PMH COPD DM2 HLD CHRONIC CHOLECYSTITIS READMITTED AFTER OPEN CHOLECYSTECTOMY HERE FOR REHAB  Pt is in usual state , no complaint  General appearance: Comfortable, no distress  ROS: No SOB  Medications reviewed  Head: Normal  Neck: Soft  Heart: Regular  Lungs: Clear  Abdomen: soft, STAPLES IN PLACE, DRAIN IN PLACE    Plan:   1) PT OT  2) TO CONTINUE HYDROMORPHONE 2 MG Q 4 H PRN    Problem List Items Addressed This Visit    None  Visit Diagnoses       Chronic cholecystitis    -  Primary               Electronically Signed By: Josue Lock MD   9/15/24  2:11 PM

## 2024-09-15 NOTE — PROGRESS NOTES
Pt was seen in the NH.  68 YO F WITH PMH COPD DM2 HLD CHRONIC CHOLECYSTITIS READMITTED AFTER OPEN CHOLECYSTECTOMY HERE FOR REHAB  Pt is in usual state , no complaint  General appearance: Comfortable, no distress  ROS: No SOB  Medications reviewed  Head: Normal  Neck: Soft  Heart: Regular  Lungs: Clear  Abdomen: soft, STAPLES IN PLACE, DRAIN IN PLACE    Plan:   1) PT OT  2) TO CONTINUE HYDROMORPHONE 2 MG Q 4 H PRN    Problem List Items Addressed This Visit    None  Visit Diagnoses       Chronic cholecystitis    -  Primary

## 2024-09-24 ENCOUNTER — NURSING HOME VISIT (OUTPATIENT)
Dept: POST ACUTE CARE | Facility: EXTERNAL LOCATION | Age: 69
End: 2024-09-24
Payer: COMMERCIAL

## 2024-09-24 DIAGNOSIS — E11.65 TYPE 2 DIABETES MELLITUS WITH HYPERGLYCEMIA, WITHOUT LONG-TERM CURRENT USE OF INSULIN: Primary | ICD-10-CM

## 2024-09-24 PROCEDURE — 99308 SBSQ NF CARE LOW MDM 20: CPT | Performed by: INTERNAL MEDICINE

## 2024-09-24 NOTE — LETTER
Patient: Hannah Pedraza  : 1955    Encounter Date: 2024    Pt was seen in the NH.  Pt is in usual state , no complaint  General appearance: Comfortable, no distress  ROS: No SOB  Medications reviewed  Head: Normal  Neck: Soft  Heart: Regular  Lungs: Clear  Abdomen: soft    Plan:   1)clinically doing fine  2) To continue metformin 1000 mg bid    Problem List Items Addressed This Visit       Type 2 diabetes mellitus with hyperglycemia, without long-term current use of insulin (Multi) - Primary          Electronically Signed By: Josue Lock MD   24  6:40 PM

## 2024-09-24 NOTE — PROGRESS NOTES
Pt was seen in the NH.  Pt is in usual state , no complaint  General appearance: Comfortable, no distress  ROS: No SOB  Medications reviewed  Head: Normal  Neck: Soft  Heart: Regular  Lungs: Clear  Abdomen: soft    Plan:   1)clinically doing fine  2) To continue metformin 1000 mg bid    Problem List Items Addressed This Visit       Type 2 diabetes mellitus with hyperglycemia, without long-term current use of insulin (Multi) - Primary

## 2024-10-07 ENCOUNTER — APPOINTMENT (OUTPATIENT)
Dept: PRIMARY CARE | Facility: CLINIC | Age: 69
End: 2024-10-07
Payer: COMMERCIAL

## 2024-10-09 ENCOUNTER — APPOINTMENT (OUTPATIENT)
Dept: PRIMARY CARE | Facility: CLINIC | Age: 69
End: 2024-10-09
Payer: COMMERCIAL

## 2024-10-10 ENCOUNTER — OFFICE VISIT (OUTPATIENT)
Dept: PRIMARY CARE | Facility: CLINIC | Age: 69
End: 2024-10-10
Payer: COMMERCIAL

## 2024-10-10 VITALS — DIASTOLIC BLOOD PRESSURE: 87 MMHG | SYSTOLIC BLOOD PRESSURE: 125 MMHG | HEART RATE: 83 BPM

## 2024-10-10 DIAGNOSIS — J45.20 MILD INTERMITTENT ASTHMA WITHOUT COMPLICATION (HHS-HCC): ICD-10-CM

## 2024-10-10 DIAGNOSIS — Z79.899 MEDICATION MANAGEMENT: ICD-10-CM

## 2024-10-10 DIAGNOSIS — M16.0 BILATERAL PRIMARY OSTEOARTHRITIS OF HIP: ICD-10-CM

## 2024-10-10 DIAGNOSIS — E78.00 HYPERCHOLESTEROLEMIA: ICD-10-CM

## 2024-10-10 DIAGNOSIS — I82.4Z2 ACUTE DEEP VEIN THROMBOSIS (DVT) OF DISTAL VEIN OF LEFT LOWER EXTREMITY (MULTI): ICD-10-CM

## 2024-10-10 DIAGNOSIS — M51.362 DEGENERATION OF INTERVERTEBRAL DISC OF LUMBAR REGION WITH DISCOGENIC BACK PAIN AND LOWER EXTREMITY PAIN: ICD-10-CM

## 2024-10-10 DIAGNOSIS — E66.01 OBESITY, MORBID (MULTI): ICD-10-CM

## 2024-10-10 DIAGNOSIS — Z00.00 ROUTINE GENERAL MEDICAL EXAMINATION AT HEALTH CARE FACILITY: Primary | ICD-10-CM

## 2024-10-10 DIAGNOSIS — I50.32 CHRONIC DIASTOLIC CHF (CONGESTIVE HEART FAILURE): ICD-10-CM

## 2024-10-10 DIAGNOSIS — E11.65 TYPE 2 DIABETES MELLITUS WITH HYPERGLYCEMIA, WITHOUT LONG-TERM CURRENT USE OF INSULIN: ICD-10-CM

## 2024-10-10 PROBLEM — R60.0 BILATERAL LEG EDEMA: Status: ACTIVE | Noted: 2021-09-03

## 2024-10-10 PROBLEM — M51.369 DDD (DEGENERATIVE DISC DISEASE), LUMBAR: Status: ACTIVE | Noted: 2021-12-02
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
## 2024-10-10 PROBLEM — R41.82 AMS (ALTERED MENTAL STATUS): Status: ACTIVE | Noted: 2024-03-29

## 2024-10-10 PROBLEM — H18.513 FUCHS' CORNEAL DYSTROPHY OF BOTH EYES: Status: ACTIVE | Noted: 2023-09-26

## 2024-10-10 PROBLEM — E27.8 MASS OF RIGHT ADRENAL GLAND (MULTI): Status: ACTIVE | Noted: 2024-05-29

## 2024-10-10 PROBLEM — D35.01 BENIGN NEOPLASM OF RIGHT ADRENAL GLAND: Status: ACTIVE | Noted: 2022-01-07

## 2024-10-10 PROBLEM — M54.42 CHRONIC MIDLINE LOW BACK PAIN WITH LEFT-SIDED SCIATICA: Status: ACTIVE | Noted: 2018-03-26

## 2024-10-10 PROBLEM — M25.559 HIP PAIN, ACUTE: Status: ACTIVE | Noted: 2023-04-30

## 2024-10-10 PROBLEM — Z86.73 HISTORY OF TRANSIENT ISCHEMIC ATTACK (TIA): Status: ACTIVE | Noted: 2024-05-29

## 2024-10-10 PROBLEM — R93.89 ENDOMETRIAL THICKENING ON ULTRASOUND: Status: ACTIVE | Noted: 2022-01-07

## 2024-10-10 PROBLEM — S22.040A: Status: ACTIVE | Noted: 2021-12-29

## 2024-10-10 PROBLEM — R25.2 BILATERAL LEG CRAMPS: Status: ACTIVE | Noted: 2022-12-14

## 2024-10-10 PROBLEM — H25.812 COMBINED FORMS OF AGE-RELATED CATARACT OF LEFT EYE: Status: ACTIVE | Noted: 2023-09-26

## 2024-10-10 PROBLEM — K76.0 FATTY LIVER DISEASE, NONALCOHOLIC: Status: ACTIVE | Noted: 2018-09-20

## 2024-10-10 PROBLEM — K21.9 GERD (GASTROESOPHAGEAL REFLUX DISEASE): Status: ACTIVE | Noted: 2024-05-29

## 2024-10-10 PROBLEM — H18.11 BULLOUS KERATOPATHY OF RIGHT EYE: Status: ACTIVE | Noted: 2023-09-26

## 2024-10-10 PROBLEM — G56.03 BILATERAL CARPAL TUNNEL SYNDROME: Status: ACTIVE | Noted: 2018-09-20

## 2024-10-10 PROBLEM — S14.129A CENTRAL CORD SYNDROME (MULTI): Status: ACTIVE | Noted: 2023-04-30

## 2024-10-10 PROBLEM — S14.129A CENTRAL CORD SYNDROME (MULTI): Status: RESOLVED | Noted: 2023-04-30 | Resolved: 2024-10-10

## 2024-10-10 PROBLEM — G89.29 CHRONIC MIDLINE LOW BACK PAIN WITH LEFT-SIDED SCIATICA: Status: ACTIVE | Noted: 2018-03-26

## 2024-10-10 PROBLEM — T88.4XXA DIFFICULT AIRWAY: Status: ACTIVE | Noted: 2024-08-30

## 2024-10-10 PROBLEM — J30.9 ALLERGIC RHINITIS: Status: ACTIVE | Noted: 2018-03-26

## 2024-10-10 PROBLEM — M19.90 ARTHRITIS: Status: ACTIVE | Noted: 2024-10-10

## 2024-10-10 PROBLEM — M54.9 BACK PAIN: Status: ACTIVE | Noted: 2024-02-27

## 2024-10-10 PROCEDURE — 1160F RVW MEDS BY RX/DR IN RCRD: CPT | Performed by: INTERNAL MEDICINE

## 2024-10-10 PROCEDURE — 1036F TOBACCO NON-USER: CPT | Performed by: INTERNAL MEDICINE

## 2024-10-10 PROCEDURE — 1159F MED LIST DOCD IN RCRD: CPT | Performed by: INTERNAL MEDICINE

## 2024-10-10 PROCEDURE — 1123F ACP DISCUSS/DSCN MKR DOCD: CPT | Performed by: INTERNAL MEDICINE

## 2024-10-10 PROCEDURE — 1170F FXNL STATUS ASSESSED: CPT | Performed by: INTERNAL MEDICINE

## 2024-10-10 PROCEDURE — 3074F SYST BP LT 130 MM HG: CPT | Performed by: INTERNAL MEDICINE

## 2024-10-10 PROCEDURE — 1158F ADVNC CARE PLAN TLK DOCD: CPT | Performed by: INTERNAL MEDICINE

## 2024-10-10 PROCEDURE — G0439 PPPS, SUBSEQ VISIT: HCPCS | Performed by: INTERNAL MEDICINE

## 2024-10-10 PROCEDURE — 99214 OFFICE O/P EST MOD 30 MIN: CPT | Performed by: INTERNAL MEDICINE

## 2024-10-10 PROCEDURE — 3079F DIAST BP 80-89 MM HG: CPT | Performed by: INTERNAL MEDICINE

## 2024-10-10 RX ORDER — OXYCODONE HYDROCHLORIDE 5 MG/1
5 TABLET ORAL EVERY 6 HOURS PRN
Qty: 120 TABLET | Refills: 0 | Status: SHIPPED | OUTPATIENT
Start: 2024-10-10

## 2024-10-10 RX ORDER — TRAZODONE HYDROCHLORIDE 50 MG/1
50 TABLET ORAL NIGHTLY
Qty: 30 TABLET | Refills: 0 | Status: SHIPPED | OUTPATIENT
Start: 2024-10-10 | End: 2024-11-09

## 2024-10-10 RX ORDER — HYDROMORPHONE HYDROCHLORIDE 2 MG/1
2 TABLET ORAL EVERY 6 HOURS PRN
COMMUNITY
End: 2024-10-10 | Stop reason: ALTCHOICE

## 2024-10-10 RX ORDER — ERGOCALCIFEROL 1.25 MG/1
1.25 CAPSULE ORAL WEEKLY
Qty: 4 CAPSULE | Refills: 0 | Status: SHIPPED | OUTPATIENT
Start: 2024-10-10 | End: 2024-11-09

## 2024-10-10 RX ORDER — TRAZODONE HYDROCHLORIDE 50 MG/1
50 TABLET ORAL NIGHTLY
COMMUNITY
End: 2024-10-10 | Stop reason: SDUPTHER

## 2024-10-10 RX ORDER — FERROUS SULFATE 325(65) MG
1 TABLET, DELAYED RELEASE (ENTERIC COATED) ORAL 2 TIMES DAILY
Qty: 30 TABLET | Refills: 0 | Status: SHIPPED | OUTPATIENT
Start: 2024-10-10

## 2024-10-10 RX ORDER — ATORVASTATIN CALCIUM 40 MG/1
40 TABLET, FILM COATED ORAL DAILY
Qty: 30 TABLET | Refills: 0 | Status: SHIPPED | OUTPATIENT
Start: 2024-10-10 | End: 2024-11-09

## 2024-10-10 RX ORDER — GLIMEPIRIDE 2 MG/1
2 TABLET ORAL DAILY
Qty: 30 TABLET | Refills: 0 | Status: SHIPPED | OUTPATIENT
Start: 2024-10-10 | End: 2024-11-09

## 2024-10-10 RX ORDER — NYSTATIN 100000 [USP'U]/G
1 POWDER TOPICAL 3 TIMES DAILY
Qty: 60 G | Refills: 0 | Status: SHIPPED | OUTPATIENT
Start: 2024-10-10

## 2024-10-10 RX ORDER — ONDANSETRON 4 MG/1
4 TABLET, FILM COATED ORAL EVERY 4 HOURS PRN
Qty: 20 TABLET | Refills: 0 | Status: SHIPPED | OUTPATIENT
Start: 2024-10-10

## 2024-10-10 RX ORDER — ERGOCALCIFEROL 1.25 MG/1
1.25 CAPSULE ORAL WEEKLY
COMMUNITY
End: 2024-10-10 | Stop reason: SDUPTHER

## 2024-10-10 RX ORDER — ALBUTEROL SULFATE 90 UG/1
2 INHALANT RESPIRATORY (INHALATION) EVERY 4 HOURS PRN
Qty: 18 G | Refills: 0 | Status: SHIPPED | OUTPATIENT
Start: 2024-10-10

## 2024-10-10 RX ORDER — POTASSIUM CHLORIDE 20 MEQ/1
20 TABLET, EXTENDED RELEASE ORAL DAILY
Qty: 30 TABLET | Refills: 0 | Status: SHIPPED | OUTPATIENT
Start: 2024-10-10 | End: 2024-11-09

## 2024-10-10 RX ORDER — GABAPENTIN 800 MG/1
800 TABLET ORAL 3 TIMES DAILY
Qty: 90 TABLET | Refills: 0 | Status: SHIPPED | OUTPATIENT
Start: 2024-10-10 | End: 2024-11-09

## 2024-10-10 RX ORDER — DULOXETIN HYDROCHLORIDE 60 MG/1
60 CAPSULE, DELAYED RELEASE ORAL DAILY
Qty: 30 CAPSULE | Refills: 0 | Status: SHIPPED | OUTPATIENT
Start: 2024-10-10 | End: 2024-11-09

## 2024-10-10 RX ORDER — NALOXONE HYDROCHLORIDE 4 MG/.1ML
1 SPRAY NASAL
Qty: 1 EACH | Refills: 0 | Status: SHIPPED | OUTPATIENT
Start: 2024-10-10

## 2024-10-10 RX ORDER — CARVEDILOL 12.5 MG/1
12.5 TABLET ORAL 2 TIMES DAILY
Qty: 60 TABLET | Refills: 0 | Status: SHIPPED | OUTPATIENT
Start: 2024-10-10 | End: 2024-11-09

## 2024-10-10 RX ORDER — METFORMIN HYDROCHLORIDE 1000 MG/1
1000 TABLET ORAL
Qty: 60 TABLET | Refills: 0 | Status: SHIPPED | OUTPATIENT
Start: 2024-10-10 | End: 2024-11-09

## 2024-10-10 RX ORDER — METOPROLOL TARTRATE 25 MG/1
25 TABLET, FILM COATED ORAL 2 TIMES DAILY
COMMUNITY
End: 2024-10-10 | Stop reason: ALTCHOICE

## 2024-10-10 RX ORDER — TIZANIDINE 4 MG/1
2 TABLET ORAL EVERY 8 HOURS PRN
Qty: 30 TABLET | Refills: 0 | Status: SHIPPED | OUTPATIENT
Start: 2024-10-10

## 2024-10-10 RX ORDER — FUROSEMIDE 20 MG/1
20 TABLET ORAL DAILY
Qty: 30 TABLET | Refills: 0 | Status: SHIPPED | OUTPATIENT
Start: 2024-10-10 | End: 2024-11-09

## 2024-10-10 ASSESSMENT — PATIENT HEALTH QUESTIONNAIRE - PHQ9
SUM OF ALL RESPONSES TO PHQ9 QUESTIONS 1 AND 2: 0
1. LITTLE INTEREST OR PLEASURE IN DOING THINGS: NOT AT ALL
SUM OF ALL RESPONSES TO PHQ9 QUESTIONS 1 AND 2: 0
2. FEELING DOWN, DEPRESSED OR HOPELESS: NOT AT ALL
1. LITTLE INTEREST OR PLEASURE IN DOING THINGS: NOT AT ALL
2. FEELING DOWN, DEPRESSED OR HOPELESS: NOT AT ALL

## 2024-10-10 ASSESSMENT — ENCOUNTER SYMPTOMS
JOINT SWELLING: 0
ALLERGIC/IMMUNOLOGIC NEGATIVE: 1
LIGHT-HEADEDNESS: 0
COUGH: 0
SHORTNESS OF BREATH: 0
ABDOMINAL DISTENTION: 0
NUMBNESS: 0
CONSTITUTIONAL NEGATIVE: 1
CONSTIPATION: 0
PALPITATIONS: 0
RESPIRATORY NEGATIVE: 1
FEVER: 0
DIARRHEA: 0
BACK PAIN: 1
DYSURIA: 0
EYES NEGATIVE: 1
CARDIOVASCULAR NEGATIVE: 1
ABDOMINAL PAIN: 0
ENDOCRINE NEGATIVE: 1
NEUROLOGICAL NEGATIVE: 1
GASTROINTESTINAL NEGATIVE: 1
EYE DISCHARGE: 0
VOMITING: 0
NAUSEA: 0
AGITATION: 0
HEMATOLOGIC/LYMPHATIC NEGATIVE: 1
NECK STIFFNESS: 0
HEADACHES: 0
CHILLS: 0
PSYCHIATRIC NEGATIVE: 1
ADENOPATHY: 0
CONFUSION: 0
WHEEZING: 0

## 2024-10-10 ASSESSMENT — ACTIVITIES OF DAILY LIVING (ADL)
MANAGING_FINANCES: TOTAL CARE
TAKING_MEDICATION: TOTAL CARE
DRESSING: NEEDS ASSISTANCE
BATHING: NEEDS ASSISTANCE
GROCERY_SHOPPING: TOTAL CARE
DOING_HOUSEWORK: TOTAL CARE

## 2024-10-10 NOTE — ASSESSMENT & PLAN NOTE
Orders:    tiZANidine (Zanaflex) 4 mg tablet; Take 0.5 tablets (2 mg) by mouth every 8 hours if needed for muscle spasms.    DULoxetine (Cymbalta) 60 mg DR capsule; Take 1 capsule (60 mg) by mouth once daily. Do not crush or chew.    gabapentin (Neurontin) 800 mg tablet; Take 1 tablet (800 mg) by mouth 3 times a day.    oxyCODONE (Roxicodone) 5 mg immediate release tablet; Take 1 tablet (5 mg) by mouth every 6 hours if needed for severe pain (7 - 10).

## 2024-10-10 NOTE — PROGRESS NOTES
Subjective   Reason for Visit: Hannah Pedraza is an 69 y.o. female here for a Medicare Wellness visit.     Past Medical, Surgical, and Family History reviewed and updated in chart.    Reviewed all medications by prescribing practitioner or clinical pharmacist (such as prescriptions, OTCs, herbal therapies and supplements) and documented in the medical record.    Here for evaluation, will be discharged from NH , daughter takes her home to [facility] chronic LBP    Wellness exam            Patient Care Team:  Sarina Kincaid MD as PCP - General     Review of Systems   Constitutional: Negative.  Negative for chills and fever.   HENT: Negative.  Negative for congestion.    Eyes: Negative.  Negative for discharge.   Respiratory: Negative.  Negative for cough, shortness of breath and wheezing.    Cardiovascular: Negative.  Negative for chest pain, palpitations and leg swelling.   Gastrointestinal: Negative.  Negative for abdominal distention, abdominal pain, constipation, diarrhea, nausea and vomiting.   Endocrine: Negative.    Genitourinary: Negative.  Negative for dysuria and urgency.   Musculoskeletal:  Positive for back pain. Negative for joint swelling and neck stiffness.   Skin: Negative.  Negative for rash.   Allergic/Immunologic: Negative.  Negative for immunocompromised state.   Neurological: Negative.  Negative for light-headedness, numbness and headaches.   Hematological: Negative.  Negative for adenopathy.   Psychiatric/Behavioral: Negative.  Negative for agitation, behavioral problems and confusion.    All other systems reviewed and are negative.      Objective   Vitals:  /87 (BP Location: Left arm, Patient Position: Sitting)   Pulse 83       Physical Exam  Vitals reviewed.   Constitutional:       General: She is not in acute distress.     Appearance: She is obese. She is ill-appearing (chronicallly, WC).   HENT:      Head: Normocephalic and atraumatic.      Nose: Nose normal.   Eyes:       Conjunctiva/sclera: Conjunctivae normal.      Pupils: Pupils are equal, round, and reactive to light.   Neck:      Vascular: No carotid bruit.   Cardiovascular:      Rate and Rhythm: Normal rate and regular rhythm.      Pulses: Normal pulses.      Heart sounds:      No gallop.   Pulmonary:      Effort: Pulmonary effort is normal. No respiratory distress.      Breath sounds: Normal breath sounds. No wheezing.   Abdominal:      General: Bowel sounds are normal.      Palpations: Abdomen is soft.      Tenderness: There is no abdominal tenderness.   Musculoskeletal:         General: Normal range of motion.      Cervical back: Normal range of motion. No rigidity.   Lymphadenopathy:      Cervical: No cervical adenopathy.   Skin:     General: Skin is warm.      Findings: No rash.   Neurological:      General: No focal deficit present.      Mental Status: She is alert and oriented to person, place, and time.   Psychiatric:         Mood and Affect: Mood normal.         Behavior: Behavior normal.         Assessment & Plan  Routine general medical examination at health care facility    Orders:    1 Year Follow Up In Primary Care - Wellness Exam; Future    Degeneration of intervertebral disc of lumbar region with discogenic back pain and lower extremity pain    Orders:    tiZANidine (Zanaflex) 4 mg tablet; Take 0.5 tablets (2 mg) by mouth every 8 hours if needed for muscle spasms.    DULoxetine (Cymbalta) 60 mg DR capsule; Take 1 capsule (60 mg) by mouth once daily. Do not crush or chew.    gabapentin (Neurontin) 800 mg tablet; Take 1 tablet (800 mg) by mouth 3 times a day.    oxyCODONE (Roxicodone) 5 mg immediate release tablet; Take 1 tablet (5 mg) by mouth every 6 hours if needed for severe pain (7 - 10).     Bilateral primary osteoarthritis of hip         Obesity, morbid (Multi)         Medication management    Orders:    nystatin (Mycostatin) 100,000 unit/gram powder; Apply 1 Application topically 3 times a day. Apply to  LLQ/Groin/Skin Folds topically every shift for excoriation for 14 days    naloxone (Narcan) 4 mg/0.1 mL nasal spray; Administer 1 spray (4 mg) into affected nostril(s) every 24 (twenty four) hours if needed (suspected overdose).    ferrous sulfate 325 (65 Fe) MG EC tablet; Take 1 tablet by mouth 2 times a day. Do not crush, chew, or split.    metFORMIN (Glucophage) 1,000 mg tablet; Take 1 tablet (1,000 mg) by mouth 2 times daily (morning and late afternoon).    ergocalciferol (Vitamin D-2) 1.25 MG (41060 UT) capsule; Take 1 capsule (1,250 mcg) by mouth 1 (one) time per week.    glimepiride (Amaryl) 2 mg tablet; Take 1 tablet (2 mg) by mouth once daily.    potassium chloride CR 20 mEq ER tablet; Take 1 tablet (20 mEq) by mouth once daily. Do not crush or chew.    naloxegol oxalate (Movantik) 25 mg; daily    ondansetron (Zofran) 4 mg tablet; Take 1 tablet (4 mg) by mouth every 4 hours if needed for nausea or vomiting.    traZODone (Desyrel) 50 mg tablet; Take 1 tablet (50 mg) by mouth once daily at bedtime.    Type 2 diabetes mellitus with hyperglycemia, without long-term current use of insulin    Orders:    metFORMIN (Glucophage) 1,000 mg tablet; Take 1 tablet (1,000 mg) by mouth 2 times daily (morning and late afternoon).    carvedilol (Coreg) 12.5 mg tablet; Take 1 tablet (12.5 mg) by mouth 2 times a day.    Chronic diastolic CHF (congestive heart failure)    Orders:    carvedilol (Coreg) 12.5 mg tablet; Take 1 tablet (12.5 mg) by mouth 2 times a day.    furosemide (Lasix) 20 mg tablet; Take 1 tablet (20 mg) by mouth once daily.    Mild intermittent asthma without complication ([facility])    Orders:    albuterol 90 mcg/actuation inhaler; Inhale 2 puffs every 4 hours if needed for shortness of breath.    Hypercholesterolemia    Orders:    atorvastatin (Lipitor) 40 mg tablet; Take 1 tablet (40 mg) by mouth once daily.    Acute deep vein thrombosis (DVT) of distal vein of left lower extremity (Multi)    Orders:     apixaban (Eliquis) 5 mg tablet; Take 1 tablet (5 mg) by mouth 2 times a day.      Advanced Care planning discussed with patient,diagnosis , treatment and prognosis discussed with pt for 16 minutes,pt has capacity to make own decision, pt does not have an AD, advised to set one up and bring a copy for the chart.     Moving to [place] to live with daughter, 1 mo supply med give, fu with new PMD    Contract done    No need for utox since temporarily supply    O[facility] HAS BEEN REVIEWED AND IS CONSISTENT WITH PRESCRIBED MEDICATIONS, CONSIDERED THE RISK OF ABUSE, DEPENDENCE, ADDICTION AND DIVERSION, MEDICATION IS FELT TO BE CLINICALLY APPROPRIATE ON THE DOCUMENTED DIAGNOSIS        Diludid was stopped and gavea month of oxycodone    May take the supply the get from nursing upon dc but should not take oxycodone and diludid the same day, she and her daughter understood and agreeable

## 2024-10-10 NOTE — ASSESSMENT & PLAN NOTE
Orders:    albuterol 90 mcg/actuation inhaler; Inhale 2 puffs every 4 hours if needed for shortness of breath.

## 2024-10-10 NOTE — ASSESSMENT & PLAN NOTE
Orders:    carvedilol (Coreg) 12.5 mg tablet; Take 1 tablet (12.5 mg) by mouth 2 times a day.    furosemide (Lasix) 20 mg tablet; Take 1 tablet (20 mg) by mouth once daily.

## 2024-10-10 NOTE — ASSESSMENT & PLAN NOTE
Orders:    metFORMIN (Glucophage) 1,000 mg tablet; Take 1 tablet (1,000 mg) by mouth 2 times daily (morning and late afternoon).    carvedilol (Coreg) 12.5 mg tablet; Take 1 tablet (12.5 mg) by mouth 2 times a day.

## 2024-10-14 ENCOUNTER — APPOINTMENT (OUTPATIENT)
Dept: PRIMARY CARE | Facility: CLINIC | Age: 69
End: 2024-10-14
Payer: COMMERCIAL

## (undated) DEVICE — SOLUTION, IRRIGATION, STERILE WATER, 1000 ML, POUR BOTTLE

## (undated) DEVICE — MASK, FACE, ANESTHESIA, ADULT, LARGE, P6, RED

## (undated) DEVICE — SOLUTION, PREP, PVP IODINE, FLIP TOP, 4OZ

## (undated) DEVICE — SOLUTION, IRRIGATION, USP, SODIUM CHLORIDE 0.9%, 3000 ML, BAG

## (undated) DEVICE — STRAP, ARMBOARD, 3IN, BLUE/WHITE, SECURE HOOK

## (undated) DEVICE — MAT, QUICK WICK, FLUID ABSORPTION, 40X30

## (undated) DEVICE — LINE, GAS SAMPLING, .05IN 1D 10FT, M/M CONNECTORS

## (undated) DEVICE — SOLUTION, IRRIGATION, USP, STERILE WATER, UROLOGICAL, 3000 ML, BAG

## (undated) DEVICE — STRAP, KNEE AND BODY, SINGLE USE

## (undated) DEVICE — CIRCUIT, BREATHING, ADULT, B/V FILTER, HMEF, 3 L BAG, 108 IN

## (undated) DEVICE — GLOVE, PROTEXIS PI CLASSIC, SZ-8.0, PF, PF, LF

## (undated) DEVICE — DRESSING, GAUZE, SPONGE, 12 PLY, CURITY, 4 X 4 IN, RIGID TRAY, STERILE

## (undated) DEVICE — GUIDEWIRE, DUAL SENSOR, .035 X 150 STRAIGHT,  3CM

## (undated) DEVICE — Device